# Patient Record
Sex: MALE | Race: WHITE | Employment: OTHER | URBAN - NONMETROPOLITAN AREA
[De-identification: names, ages, dates, MRNs, and addresses within clinical notes are randomized per-mention and may not be internally consistent; named-entity substitution may affect disease eponyms.]

---

## 2018-01-29 ENCOUNTER — HOSPITAL ENCOUNTER (OUTPATIENT)
Dept: PHYSICAL THERAPY | Age: 83
Setting detail: THERAPIES SERIES
Discharge: HOME | End: 2018-01-29
Payer: MEDICARE

## 2018-01-29 PROCEDURE — G8991 OTHER PT/OT GOAL STATUS: HCPCS

## 2018-01-29 PROCEDURE — 97110 THERAPEUTIC EXERCISES: CPT

## 2018-01-29 PROCEDURE — G8990 OTHER PT/OT CURRENT STATUS: HCPCS

## 2018-01-29 PROCEDURE — 97161 PT EVAL LOW COMPLEX 20 MIN: CPT

## 2018-02-02 ENCOUNTER — HOSPITAL ENCOUNTER (OUTPATIENT)
Dept: PHYSICAL THERAPY | Age: 83
Setting detail: THERAPIES SERIES
Discharge: HOME | End: 2018-02-02
Payer: MEDICARE

## 2018-02-02 PROCEDURE — 97110 THERAPEUTIC EXERCISES: CPT

## 2018-02-02 PROCEDURE — 97116 GAIT TRAINING THERAPY: CPT

## 2018-02-02 NOTE — PROGRESS NOTES
[place]     Time In: 9  Time Out: 1430  Minutes: 45  Timed Code Treatment Minutes: 45 Minutes     Date: 2018  Patient Name: Franklin Chicas,  Gender:  male        CSN: [id number]   : 1930  (80 y.o.)  Referral Date : 17    Referring Practitioner: Dr. Cyndy Benz MD       Diagnosis: Low back pain M54.5, Lumbar DDD M51.36, Left foot drop M21.372  Treatment Diagnosis: L ankle weakness, fall history, decreased balance   Additional Pertinent Hx: Lumbar fracture  - reports he was trying to get out of pool without ladder, XR shows disc height loss L5/S1, old L1 compression fx. In May 2017 he fell in his bathroom trying to wash his feet in the sink, landed on buttocks. Went to [place] - no fracture. [facility]. General:  PT Visit Information  Onset Date: 18  PT Insurance Information: Medicare - [employer] Secondary - $3700 cap, Aquatic YES, modalities YES, no ionto, no heat/cold  Total # of Visits to Date: 2  Plan of Care/Certification Expiration Date: 18  Progress Note Counter: 2/10 for PN               Subjective:  Chart Reviewed: Yes  Patient assessed for rehabilitation services?: Yes  Comments: Physician follow up: unknown      Subjective: Patient has been performing HEP for ankle strengthening. He has difficulty with standing balance. Today he presents with L AFO and tennis shoes. Pain:  Patient Currently in Pain: Denies         Objective    Exercises  Exercise 2: 20 reps Green theraband 4 way - with therapist holding band. Patient is using his desk at home. No AFO seated. Exercise 3: Tandem balance x30 sec each foot position - frequent LOB with L foot behind - wearing AFO  Exercise 5: Patient spent about 10 mins changing into tennis shoes and L AFO - used shoe horn for R shoe. Observation of gait wearing L carbon AFO.    Exercise 6: L carbon AFO -

## 2018-02-05 ENCOUNTER — HOSPITAL ENCOUNTER (OUTPATIENT)
Dept: PHYSICAL THERAPY | Age: 83
Setting detail: THERAPIES SERIES
Discharge: HOME | End: 2018-02-05
Payer: MEDICARE

## 2018-02-05 PROCEDURE — 97110 THERAPEUTIC EXERCISES: CPT

## 2018-02-09 ENCOUNTER — APPOINTMENT (OUTPATIENT)
Dept: PHYSICAL THERAPY | Age: 83
End: 2018-02-09
Payer: MEDICARE

## 2018-02-13 ENCOUNTER — HOSPITAL ENCOUNTER (OUTPATIENT)
Dept: PHYSICAL THERAPY | Age: 83
Setting detail: THERAPIES SERIES
Discharge: HOME | End: 2018-02-13
Payer: MEDICARE

## 2018-02-13 PROCEDURE — 97110 THERAPEUTIC EXERCISES: CPT

## 2018-02-13 NOTE — PROGRESS NOTES
cues from therapist in order to be IND for HEP. Short term goal 3: Patient will increase L ankle strength to 5/5 including single leg heel raise x20 reps no pain in order to stabilize ankle during gait. Short term goal 4: Patient will balance in either tandem stance x30 sec no LOB in order to decrease path deviation during gait. Long term goals  Time Frame for Long term goals : 8 weeks  Long term goal 1: Patient will ascend/descend full flight of steps no handrail reciprocal pattern without LOB in order to decrease risk of falls. Long term goal 2: Patient will balance in SLS x10 sec either LE in order to stabilize ankle for stepping over obstacles. Long term goal 3: Patient will demonstrate no LOB with tandem walking, lateral, retro, or braided walking, and lunge walking in order to stabilize ankle during gait. Long term goal 4: Patient will demonstrate no LOB with tapping and stepping over hurdles, walking with head turns, or quick direction changes with walking.           Flor Bonner, PT

## 2018-02-16 ENCOUNTER — HOSPITAL ENCOUNTER (OUTPATIENT)
Dept: PHYSICAL THERAPY | Age: 83
Setting detail: THERAPIES SERIES
Discharge: HOME | End: 2018-02-16
Payer: MEDICARE

## 2018-02-16 PROCEDURE — 97110 THERAPEUTIC EXERCISES: CPT

## 2018-02-16 NOTE — PROGRESS NOTES
around desk similar to home set up. Patient no longer needed cues for set up or technique with eversion, inversion. Needed max set up for dorsiflexion. Exercise 3: In // bars - Tandem balance x30 sec each foot position - light UE on railing, maximum 30 sec before LOB    Exercise 4: In // bars - Red chichi disc under each foot step stance, and tandem stance x30 sec each foot position, maximum 8 sec before LOB. Exercise 8: In // bars Blue foam - B 3 way hip x10 reps - no UE - emphasis on balance - frequent light touches to railing for balance. Exercise 9: Standing step stretching - hamstring and gastroc 2x 20 sec   Exercise 10: Rocker board - 30 sec either direction and x10 reps - frequent UE support   Exercise 12: Advised that patient is safe to use massage to lateral lower leg for peroneals in an attempt to improve his malleolus pain at night. Patient demonstrates understanding of techniques - massage but no trigger point or deep pressure          Activity Tolerance:  Activity Tolerance: Patient Tolerated treatment well    Assessment: Body structures, Functions, Activity limitations: Decreased strength, Decreased endurance, Decreased balance, Decreased high-level IADLs  Assessment: Patient demonstrates improved technique with ankle theraband but still needed set up for ankle dorsiflexion. Continues to demo L ankle weakness with standing balance tasks. Prognosis: Excellent       Patient Education:  Patient Education: Continue theraband HEP for ankle. May initiate massage to L ankle malleolus and peroneals. Plan:  Times per week: 2x/week  Plan weeks: 8 weeks  Specific instructions for Next Treatment: Stationary bike warm up, Step stretching (gastroc, hamstring), standing balance (rockerboard, foam, BOSU, hydrostick tandem, single leg), dynamic balance (hurdles, lunge walking, tandem walking, retro, lateral, braiding), Single leg eccentric tap down.  Emphasis on L ankle balance and

## 2018-02-20 ENCOUNTER — HOSPITAL ENCOUNTER (OUTPATIENT)
Dept: PHYSICAL THERAPY | Age: 83
Setting detail: THERAPIES SERIES
Discharge: HOME | End: 2018-02-20
Payer: MEDICARE

## 2018-02-20 PROCEDURE — 97110 THERAPEUTIC EXERCISES: CPT

## 2018-02-20 NOTE — PROGRESS NOTES
demonstrate good technique for 4 way ankle theraband without cues from therapist in order to be IND for HEP. Short term goal 3: Patient will increase L ankle strength to 5/5 including single leg heel raise x20 reps no pain in order to stabilize ankle during gait. Short term goal 4: Patient will balance in either tandem stance x30 sec no LOB in order to decrease path deviation during gait. Long term goals  Time Frame for Long term goals : 8 weeks  Long term goal 1: Patient will ascend/descend full flight of steps no handrail reciprocal pattern without LOB in order to decrease risk of falls. Long term goal 2: Patient will balance in SLS x10 sec either LE in order to stabilize ankle for stepping over obstacles. Long term goal 3: Patient will demonstrate no LOB with tandem walking, lateral, retro, or braided walking, and lunge walking in order to stabilize ankle during gait. Long term goal 4: Patient will demonstrate no LOB with tapping and stepping over hurdles, walking with head turns, or quick direction changes with walking.           Eitan Gan, PT

## 2018-02-23 ENCOUNTER — HOSPITAL ENCOUNTER (OUTPATIENT)
Dept: PHYSICAL THERAPY | Age: 83
Setting detail: THERAPIES SERIES
Discharge: HOME | End: 2018-02-23
Payer: MEDICARE

## 2018-02-23 PROCEDURE — 97110 THERAPEUTIC EXERCISES: CPT

## 2018-02-23 NOTE — PROGRESS NOTES
[place]     Time In: 4936  Time Out: 1524  Minutes: 39  Timed Code Treatment Minutes: 39 Minutes     Date: 2018  Patient Name: Nisa Sotelo,  Gender:  male        CSN: [id number]   : 1930  (80 y.o.)  Referral Date : 17    Referring Practitioner: Dr. Kassie Mclaughlin MD       Diagnosis: Low back pain M54.5, Lumbar DDD M51.36, Left foot drop M21.372  Treatment Diagnosis: L ankle weakness, fall history, decreased balance   Additional Pertinent Hx: Lumbar fracture  - reports he was trying to get out of pool without ladder, XR shows disc height loss L5/S1, old L1 compression fx. In May 2017 he fell in his bathroom trying to wash his feet in the sink, landed on buttocks. Went to [place] - no fracture. [facility]. General:  PT Visit Information  Onset Date: 18  PT Insurance Information: Medicare - [employer] Secondary - $3700 cap, Aquatic YES, modalities YES, no ionto, no heat/cold  Total # of Visits to Date: 7  Plan of Care/Certification Expiration Date: 18  Progress Note Counter: 7/10 for PN               Subjective:  Chart Reviewed: Yes  Patient assessed for rehabilitation services?: Yes  Comments: Physician follow up: [place] follow up 3/15/18 - hopes to have changes made to AFO. Subjective: Patient has been performing HEP for ankle strengthening. Today he is wearing loafers despite being asked to wear tennis shoes. Patient reports that when he walks pushing a shopping cart, he does not notice his foot slap as much. Patient wanting to continue PT after reassessment next visit in order to walk with more stability. Pain:  Patient Currently in Pain: Denies         Objective    Exercises  Exercise 1: Total gym Heel raises Level J x20 reps   Exercise 2: 15 reps Green theraband 4 way - with band tied around desk similar to home set up.  Patient no

## 2018-02-27 ENCOUNTER — HOSPITAL ENCOUNTER (OUTPATIENT)
Dept: PHYSICAL THERAPY | Age: 83
Setting detail: THERAPIES SERIES
Discharge: HOME | End: 2018-02-27
Payer: MEDICARE

## 2018-02-27 PROCEDURE — G8991 OTHER PT/OT GOAL STATUS: HCPCS

## 2018-02-27 PROCEDURE — 97110 THERAPEUTIC EXERCISES: CPT

## 2018-02-27 PROCEDURE — G8990 OTHER PT/OT CURRENT STATUS: HCPCS

## 2018-02-27 NOTE — PROGRESS NOTES
[facility]     Time In: 7183  Time Out: 5971  Minutes: 45  Timed Code Treatment Minutes: 45 Minutes     Date: 2018  Patient Name: Nisa Sotelo,  Gender:  male        CSN: [id number]   : 1930  (80 y.o.)  Referral Date : 17    Referring Practitioner: Dr. Kassie Mclaughlin MD       Diagnosis: Low back pain M54.5, Lumbar DDD M51.36, Left foot drop M21.372  Treatment Diagnosis: L ankle weakness, fall history, decreased balance   Additional Pertinent Hx: Lumbar fracture  - reports he was trying to get out of pool without ladder, XR shows disc height loss L5/S1, old L1 compression fx. In May 2017 he fell in his bathroom trying to wash his feet in the sink, landed on buttocks. Went to [facility] - no fracture. [facility]. General:  PT Visit Information  Onset Date: 18  PT Insurance Information: Medicare - [employer] Secondary - $3700 cap, Aquatic YES, modalities YES, no ionto, no heat/cold  Total # of Visits to Date: 8  Plan of Care/Certification Expiration Date: 18  Progress Note Counter: 0/10 for PN - progress note visit #8 on 18               Subjective:  Chart Reviewed: Yes  Patient assessed for rehabilitation services?: Yes  Comments: Physician follow up: [facility] follow up 3/15/18 - hopes to have changes made to AFO. Subjective: Patient has been performing HEP for ankle strengthening. He has not been wearing AFO, plans to ask for a newer fitting brace. Patient feels he is walking a little better, wants to continue PT. Pain:  Patient Currently in Pain: Denies         Objective    Exercises  Exercise 2: 15 reps Green theraband 4 way - with band tied around desk similar to home set up. Patient no longer needed cues for set up or technique with eversion, inversion. Needed max set up for dorsiflexion.    Exercise 3: In // bars - Tandem balance x30 sec Comment: Continue PT POC    Goals:  Patient goals : Patient wants to primarily focus on L ankle control, denies LBP     Short term goals  Time Frame for Short term goals: 4 weeks   Short term goal 1: Patient will report decreased L lateral gastroc/peroneal pain at end of day to 25% of the time in order to improve quality of sleep. - Goal Met 2/27/18 - patient reports he no longer has pain in L ankle/lateral malleolus at night   Short term goal 2: Patient will demonstrate good technique for 4 way ankle theraband without cues from therapist in order to be IND for HEP.  - Goal Met 2/27/18 - no cues needed for technique, patient performing daily for HEP. Short term goal 3: Patient will increase L ankle strength to 5/5 including single leg heel raise x20 reps no pain in order to stabilize ankle during gait. - Not Met 2/27/18 - progress made - ankle Inv 5/5, ankle Ev 5/5, ankle DF 4+/5, and single leg heel raise x10 reps no pain. Short term goal 4: Patient will balance in either tandem stance x30 sec no LOB in order to decrease path deviation during gait. - Goal Met 2/27/18 - 30 seconds, no UE support, no LOB    Long term goals  Time Frame for Long term goals : 8 weeks  Long term goal 1: Patient will ascend/descend full flight of steps no handrail reciprocal pattern without LOB in order to decrease risk of falls. - Not Met 2/27/18 - patient still using one handrail with reciprocal pattern for climbing stairs due to balance. Long term goal 2: Patient will balance in SLS x10 sec either LE in order to stabilize ankle for stepping over obstacles. - Not Met 2/27/18 - R foot 10 sec, L foot 6 sec   Long term goal 3: Patient will demonstrate no LOB with tandem walking, lateral, retro, or braided walking, and lunge walking in order to stabilize ankle during gait.  - Not Met 2/27/18 - occ LOB with CGA from therapist during these x25   Long term goal 4: Patient will demonstrate no LOB with tapping and stepping over hurdles,

## 2018-03-05 ENCOUNTER — HOSPITAL ENCOUNTER (OUTPATIENT)
Dept: PHYSICAL THERAPY | Age: 83
Setting detail: THERAPIES SERIES
Discharge: HOME | End: 2018-03-05
Payer: MEDICARE

## 2018-03-05 PROCEDURE — 97110 THERAPEUTIC EXERCISES: CPT

## 2018-03-05 NOTE — PROGRESS NOTES
[place]     Time In: 6099  Time Out: 1625  Minutes: 40  Timed Code Treatment Minutes: 40 Minutes     Date: 3/5/2018  Patient Name: Carla Ashby,  Gender:  male        CSN: [id number]   : 1930  (80 y.o.)  Referral Date : 17    Referring Practitioner: Dr. Galdino Fitzgerald MD       Diagnosis: Low back pain M54.5, Lumbar DDD M51.36, Left foot drop M21.372  Treatment Diagnosis: L ankle weakness, fall history, decreased balance   Additional Pertinent Hx: Lumbar fracture  - reports he was trying to get out of pool without ladder, XR shows disc height loss L5/S1, old L1 compression fx. In May 2017 he fell in his bathroom trying to wash his feet in the sink, landed on buttocks. Went to [place] - no fracture. [facility]. General:  PT Visit Information  Onset Date: 18  PT Insurance Information: Medicare - [employer] Secondary - $3700 cap, Aquatic YES, modalities YES, no ionto, no heat/cold  Total # of Visits to Date: 9  Plan of Care/Certification Expiration Date: 18  Progress Note Counter: 1/10 for PN - progress note visit #8 on 18               Subjective:  Chart Reviewed: Yes  Patient assessed for rehabilitation services?: Yes  Comments: Physician follow up: [place] follow up 3/15/18 - hopes to have changes made to AFO. Subjective: Patient denies any complaint, no changes with L foot or ankle. He has been performing HEP consistently. Pain:  Patient Currently in Pain: Denies         Objective    Exercises  Exercise 3: In // [employer] - single leg - L leg maximum 6 sec, R leg maximum 8 sec   Exercise 4: In // bars - 4 inch step eccentric tap down no UE x20 reps each  Exercise 8:  In // bars level surface - B 3 way hip x15 reps, heel raise and toe raises x20 each - no UE - emphasis on balance   Exercise 9: Standing step stretching - hamstring and gastroc x15 sec each Exercise 10: Rocker board - 30 sec either direction and x10 reps no UE - May discontinue next time   Exercise 13: In open clinic - Retro and lateral walking 2x25 ft, braided walking 2x25 ft, tandem walking 2x 25 ft - CGA from therapist  Exercise 14: Orange hurdles stepping over 2x 20 ft, and tapping then step over 4x 20 ft - more difficulty with L CC step over         Activity Tolerance:  Activity Tolerance: Patient Tolerated treatment well    Assessment:  Assessment: Progressed standing dynamic balance such as retro walking, multidirectional stepping, and hurdles, with occasional LOB needing CGA from therapist.   Prognosis: Excellent       Patient Education:  Patient Education: HEP handout provided today - SLS, hamstring and gastroc step stretches, tandem walking, braided walking, eccentric step down                      Plan:  Times per week: 2x/week  Plan weeks: 8 weeks  Specific instructions for Next Treatment: Stationary bike warm up, Step stretching (gastroc, hamstring), standing balance (rockerboard, foam, BOSU, hydrostick tandem, single leg), dynamic balance (hurdles, lunge walking, tandem walking, retro, lateral, braiding), Single leg eccentric tap down. Emphasis on L ankle balance and control. Current Treatment Recommendations: Strengthening, ROM, Balance Training, Gait Training, Stair training, Neuromuscular Re-education, Manual Therapy - Soft Tissue Mobilization, Home Exercise Program, Modalities (Modalities may include ultrasound, electrical stim, heat/cold as needed for pain)  Plan Comment: Continue PT POC    Goals:  Patient goals : Patient wants to primarily focus on L ankle control, denies LBP     Short term goals  Time Frame for Short term goals: 4 weeks   Short term goal 3: Patient will increase L ankle strength to 5/5 including single leg heel raise x20 reps no pain in order to stabilize ankle during gait.  - Not Met 2/27/18 - progress made - ankle Inv 5/5, ankle Ev 5/5, ankle DF 4+/5, and

## 2018-03-06 ENCOUNTER — APPOINTMENT (OUTPATIENT)
Dept: PHYSICAL THERAPY | Age: 83
End: 2018-03-06
Payer: MEDICARE

## 2018-03-13 ENCOUNTER — HOSPITAL ENCOUNTER (OUTPATIENT)
Dept: PHYSICAL THERAPY | Age: 83
Setting detail: THERAPIES SERIES
Discharge: HOME | End: 2018-03-13
Payer: MEDICARE

## 2018-03-13 PROCEDURE — 97110 THERAPEUTIC EXERCISES: CPT

## 2018-03-16 ENCOUNTER — HOSPITAL ENCOUNTER (OUTPATIENT)
Dept: PHYSICAL THERAPY | Age: 83
Setting detail: THERAPIES SERIES
Discharge: HOME | End: 2018-03-16
Payer: MEDICARE

## 2018-03-20 ENCOUNTER — APPOINTMENT (OUTPATIENT)
Dept: PHYSICAL THERAPY | Age: 83
End: 2018-03-20
Payer: MEDICARE

## 2018-03-23 ENCOUNTER — HOSPITAL ENCOUNTER (OUTPATIENT)
Dept: PHYSICAL THERAPY | Age: 83
Setting detail: THERAPIES SERIES
Discharge: HOME | End: 2018-03-23
Payer: MEDICARE

## 2018-03-23 PROCEDURE — 97110 THERAPEUTIC EXERCISES: CPT

## 2018-03-27 ENCOUNTER — APPOINTMENT (OUTPATIENT)
Dept: PHYSICAL THERAPY | Age: 83
End: 2018-03-27
Payer: MEDICARE

## 2018-03-27 ENCOUNTER — HOSPITAL ENCOUNTER (OUTPATIENT)
Dept: PHYSICAL THERAPY | Age: 83
Setting detail: THERAPIES SERIES
Discharge: HOME | End: 2018-03-27
Payer: MEDICARE

## 2018-03-27 PROCEDURE — 97110 THERAPEUTIC EXERCISES: CPT

## 2018-03-27 NOTE — PROGRESS NOTES
Standing step stretching - hamstring and gastroc x15 sec each   Exercise 13: In open clinic - Retro walking 2x25 ft, tandem walking 2x 25 ft - CGA from therapist   Exercise 14: Orange hurdles stepping over forward and lateral 2x 20 ft, and tapping then step over forward and lateral 2x 20 ft - more difficulty with L CC step over,          Activity Tolerance:  Activity Tolerance: Patient Tolerated treatment well    Assessment:  Assessment: Patient continues to demonstrate occasional LOB with balance tasks, would benefit from additional PT following reassessment in order to improve balance. Prognosis: Excellent       Patient Education:  Patient Education: Continue standing balance HEP. Continue towel scrunch L foot                      Plan:  Times per week: 2x/week  Plan weeks: 8 weeks  Specific instructions for Next Treatment: Stationary bike warm up, Step stretching (gastroc, hamstring), standing balance (rockerboard, foam, BOSU, hydrostick tandem, single leg), dynamic balance (hurdles, lunge walking, tandem walking, retro, lateral, braiding), Single leg eccentric tap down. Emphasis on L ankle balance and control. Current Treatment Recommendations: Strengthening, ROM, Balance Training, Gait Training, Stair training, Neuromuscular Re-education, Manual Therapy - Soft Tissue Mobilization, Home Exercise Program, Modalities (Modalities may include ultrasound, electrical stim, heat/cold as needed for pain)  Plan Comment: Continue PT POC    Goals:  Patient goals : Patient wants to primarily focus on L ankle control, denies LBP     Short term goals  Time Frame for Short term goals: 4 weeks   Short term goal 3: Patient will increase L ankle strength to 5/5 including single leg heel raise x20 reps no pain in order to stabilize ankle during gait.  - Not Met 3/23/18 - no change from 2/27/18 - 5/5 all directions except L ankle DF 4+/5    Long term goals  Time Frame for Long term goals : 8 weeks  Long term goal 1: Patient will

## 2018-03-29 ENCOUNTER — HOSPITAL ENCOUNTER (OUTPATIENT)
Dept: PHYSICAL THERAPY | Age: 83
Setting detail: THERAPIES SERIES
Discharge: HOME | End: 2018-03-29
Payer: MEDICARE

## 2018-03-29 PROCEDURE — G8991 OTHER PT/OT GOAL STATUS: HCPCS

## 2018-03-29 PROCEDURE — G8990 OTHER PT/OT CURRENT STATUS: HCPCS

## 2018-03-29 PROCEDURE — 97110 THERAPEUTIC EXERCISES: CPT

## 2018-03-29 NOTE — PROGRESS NOTES
I certify that I have examined the patient below and determined that Physical Medicine and Rehabilitation service is necessary; that the secondary diagnosis for the provision of rehabilitation services is consistent with identified needs; that service will be furnished on an outpatient basis while the patient is in my care; that I approve the above plan of care for up to 90 days or as specifically noted above and will review it within that time frame or more often if the patients condition requires. Attestation, signature or co-signature of physician indicates approval of certification requirements.    ________________________ ____________ __________  Physician Signature   Date   Time        [facility]     Time In: 5 (Late arrival )  Time Out: 1530  Minutes: 35  Timed Code Treatment Minutes: 35 Minutes     Date: 3/29/2018  Patient Name: Ara Stanley,  Gender:  male        CSN: [id number]   : 1930  (80 y.o.)  Referral Date : 17    Referring Practitioner: Dr. Tonie Vega MD       Diagnosis: Low back pain M54.5, Lumbar DDD M51.36, Left foot drop M21.372  Treatment Diagnosis: L ankle weakness, fall history, decreased balance   Additional Pertinent Hx: Lumbar fracture  - reports he was trying to get out of pool without ladder, XR shows disc height loss L5/S1, old L1 compression fx. In May 2017 he fell in his bathroom trying to wash his feet in the sink, landed on buttocks. Went to [place] - no fracture. [facility].                    General:  PT Visit Information  Onset Date: 18  PT Insurance Information: Medicare - [employer] Secondary - $3700 cap, Aquatic YES, modalities YES, no ionto, no heat/cold  Total # of Visits to Date: 15  Plan of Care/Certification Expiration Date: 18 (4 weeks extended certification date)  Progress Note Counter: 0/8 for PN - progress note Prognosis: Excellent       Patient Education:  Patient Education: Continue standing balance HEP. Continue towel scrunch L foot and ankle theraband for foot and ankle stability. Plan:  Times per week: 2x/week  Plan weeks: 12 weeks extended plan of care  Specific instructions for Next Treatment: Stationary bike warm up, Step stretching (gastroc, hamstring), standing balance (rockerboard, foam, BOSU, hydrostick tandem, single leg), dynamic balance (hurdles, lunge walking, tandem walking, retro, lateral, braiding), Single leg eccentric tap down. Emphasis on L ankle balance and control. Current Treatment Recommendations: Strengthening, ROM, Balance Training, Gait Training, Stair training, Neuromuscular Re-education, Manual Therapy - Soft Tissue Mobilization, Home Exercise Program, Modalities (Modalities may include ultrasound, electrical stim, heat/cold as needed for pain)  Plan Comment: Continue PT POC    Goals:  Patient goals : Patient wants to primarily focus on L ankle control, denies LBP     Short term goals  Time Frame for Short term goals: 4 weeks   Short term goal 1: Patient will report decreased L lateral gastroc/peroneal pain at end of day to 25% of the time in order to improve quality of sleep. - Goal Met 2/27/18 - patient reports he no longer has pain in L ankle/lateral malleolus at night   Short term goal 2: Patient will demonstrate good technique for 4 way ankle theraband without cues from therapist in order to be IND for HEP.  - Goal Met 2/27/18 - no cues needed for technique, patient performing daily for HEP. Short term goal 3: Patient will increase L ankle strength to 5/5 including single leg heel raise x20 reps no pain in order to stabilize ankle during gait. - Not Met 3/29/18 - no change from 2/27/18 - 5/5 all directions except L ankle DF 4+/5  Short term goal 4: Patient will balance in either tandem stance x30 sec no LOB in order to decrease path deviation during gait.  - Goal Met 2/27/18 - 30 seconds, no UE support, no LOB    Long term goals  Time Frame for Long term goals : 12 weeks extended plan of care  Long term goal 1: Patient will ascend/descend full flight of steps no handrail reciprocal pattern without LOB in order to decrease risk of falls. - Not Met 3/29/18 - progress made, Patient able to attempt steps without handrail but occasional scissoring especially with descending steps, unsteady without railing  Long term goal 2: Patient will balance in SLS x10 sec either LE in order to stabilize ankle for stepping over obstacles. - Goal Met 3/29/18 - R foot 15 sec, L foot 13 sec  Long term goal 3: Patient will demonstrate no LOB with tandem walking, lateral, retro, or braided walking, and lunge walking in order to stabilize ankle during gait. - Not Met 3/29/18 - discontinued lunge walking due to c/o knee pain, improved lateral walking, but retro and tandem walking still causes occ LOB with CGA from therapist   Long term goal 4: Patient will demonstrate no LOB with tapping and stepping over hurdles, walking with head turns, or quick direction changes with walking. - Not Met 3/29/18 - able to step over 12 inch hurdles no LOB, however walking with direction changes causes patient to lose balance frequently, often scissoring or crossing feet to recover balance. PT G-Codes  Functional Assessment Tool Used: Single leg stance balance (Goal of 10 sec either LE)  Score: RLE 15 sec, LLE 13 sec (0% impairment, improved from baseline 2 sec on LLE or 80% impairment)  Functional Limitation: Other PT primary  Other PT Primary Current Status (): 0 percent impaired, limited or restricted  Other PT Primary Goal Status ():  At least 60 percent but less than 80 percent impaired, limited or restricted (Goal Met)    Isabel Robledo, PT

## 2018-04-10 ENCOUNTER — HOSPITAL ENCOUNTER (OUTPATIENT)
Dept: PHYSICAL THERAPY | Age: 83
Setting detail: THERAPIES SERIES
Discharge: HOME | End: 2018-04-10
Payer: MEDICARE

## 2018-04-10 PROCEDURE — 97110 THERAPEUTIC EXERCISES: CPT

## 2018-04-13 ENCOUNTER — HOSPITAL ENCOUNTER (OUTPATIENT)
Dept: PHYSICAL THERAPY | Age: 83
Setting detail: THERAPIES SERIES
Discharge: HOME | End: 2018-04-13
Payer: MEDICARE

## 2018-04-13 PROCEDURE — 97110 THERAPEUTIC EXERCISES: CPT

## 2018-04-17 ENCOUNTER — HOSPITAL ENCOUNTER (OUTPATIENT)
Dept: PHYSICAL THERAPY | Age: 83
Setting detail: THERAPIES SERIES
Discharge: HOME | End: 2018-04-17
Payer: MEDICARE

## 2018-04-17 PROCEDURE — 97110 THERAPEUTIC EXERCISES: CPT

## 2018-04-20 ENCOUNTER — HOSPITAL ENCOUNTER (OUTPATIENT)
Dept: PHYSICAL THERAPY | Age: 83
Setting detail: THERAPIES SERIES
Discharge: HOME | End: 2018-04-20
Payer: MEDICARE

## 2018-04-20 PROCEDURE — 97110 THERAPEUTIC EXERCISES: CPT

## 2018-04-24 ENCOUNTER — HOSPITAL ENCOUNTER (OUTPATIENT)
Dept: PHYSICAL THERAPY | Age: 83
Setting detail: THERAPIES SERIES
Discharge: HOME | End: 2018-04-24
Payer: MEDICARE

## 2018-04-24 PROCEDURE — 97110 THERAPEUTIC EXERCISES: CPT

## 2018-04-27 ENCOUNTER — HOSPITAL ENCOUNTER (OUTPATIENT)
Dept: PHYSICAL THERAPY | Age: 83
Setting detail: THERAPIES SERIES
Discharge: HOME | End: 2018-04-27
Payer: MEDICARE

## 2018-05-01 ENCOUNTER — HOSPITAL ENCOUNTER (OUTPATIENT)
Dept: PHYSICAL THERAPY | Age: 83
Setting detail: THERAPIES SERIES
Discharge: HOME | End: 2018-05-01
Payer: MEDICARE

## 2018-05-01 PROCEDURE — 97110 THERAPEUTIC EXERCISES: CPT

## 2018-05-01 PROCEDURE — G8992 OTHER PT/OT  D/C STATUS: HCPCS

## 2018-05-01 PROCEDURE — G8991 OTHER PT/OT GOAL STATUS: HCPCS

## 2018-05-24 ENCOUNTER — PROCEDURE VISIT (OUTPATIENT)
Dept: NEUROLOGY | Age: 83
End: 2018-05-24
Payer: MEDICARE

## 2018-05-24 DIAGNOSIS — M54.16 LEFT LUMBAR RADICULOPATHY: Primary | ICD-10-CM

## 2018-05-24 DIAGNOSIS — R29.898 LEFT LEG WEAKNESS: ICD-10-CM

## 2018-05-24 PROCEDURE — 95908 NRV CNDJ TST 3-4 STUDIES: CPT | Performed by: PSYCHIATRY & NEUROLOGY

## 2018-05-24 PROCEDURE — 95886 MUSC TEST DONE W/N TEST COMP: CPT | Performed by: PSYCHIATRY & NEUROLOGY

## 2018-08-09 ENCOUNTER — HOSPITAL ENCOUNTER (OUTPATIENT)
Dept: NON INVASIVE DIAGNOSTICS | Age: 83
Discharge: HOME | End: 2018-08-09
Payer: MEDICARE

## 2018-08-09 LAB
LV EF: 63 %
LVEF MODALITY: NORMAL

## 2018-08-09 PROCEDURE — 93306 TTE W/DOPPLER COMPLETE: CPT

## 2018-09-04 ENCOUNTER — OFFICE VISIT (OUTPATIENT)
Dept: CARDIOLOGY CLINIC | Age: 83
End: 2018-09-04
Payer: MEDICARE

## 2018-09-04 VITALS
WEIGHT: 148 LBS | HEART RATE: 64 BPM | DIASTOLIC BLOOD PRESSURE: 78 MMHG | BODY MASS INDEX: 21.19 KG/M2 | SYSTOLIC BLOOD PRESSURE: 138 MMHG | HEIGHT: 70 IN

## 2018-09-04 DIAGNOSIS — I35.0 AORTIC VALVE STENOSIS, ETIOLOGY OF CARDIAC VALVE DISEASE UNSPECIFIED: ICD-10-CM

## 2018-09-04 PROCEDURE — 1036F TOBACCO NON-USER: CPT | Performed by: INTERNAL MEDICINE

## 2018-09-04 PROCEDURE — 4040F PNEUMOC VAC/ADMIN/RCVD: CPT | Performed by: INTERNAL MEDICINE

## 2018-09-04 PROCEDURE — G8420 CALC BMI NORM PARAMETERS: HCPCS | Performed by: INTERNAL MEDICINE

## 2018-09-04 PROCEDURE — 93000 ELECTROCARDIOGRAM COMPLETE: CPT | Performed by: INTERNAL MEDICINE

## 2018-09-04 PROCEDURE — 99204 OFFICE O/P NEW MOD 45 MIN: CPT | Performed by: INTERNAL MEDICINE

## 2018-09-04 PROCEDURE — 1101F PT FALLS ASSESS-DOCD LE1/YR: CPT | Performed by: INTERNAL MEDICINE

## 2018-09-04 PROCEDURE — G8427 DOCREV CUR MEDS BY ELIG CLIN: HCPCS | Performed by: INTERNAL MEDICINE

## 2018-09-04 PROCEDURE — 1123F ACP DISCUSS/DSCN MKR DOCD: CPT | Performed by: INTERNAL MEDICINE

## 2018-09-04 ASSESSMENT — ENCOUNTER SYMPTOMS
SHORTNESS OF BREATH: 0
SORE THROAT: 0
LEFT EYE: 0
BACK PAIN: 0
BLURRED VISION: 0
ABDOMINAL PAIN: 0
VOMITING: 0
NAUSEA: 0
RIGHT EYE: 0
DOUBLE VISION: 0
COUGH: 0
CONSTIPATION: 0
WHEEZING: 0
DIARRHEA: 0

## 2018-09-04 NOTE — PROGRESS NOTES
[facility]  [address] [facility] [address] [facility] [facility]  Dept: [phone]  Dept Fax: [phone]      CARDIAC ELECTROPHYSIOLOGY  CONSULTATION    9/4/2018      REFERRING PROVIDER:  Dr. German Ruiz:  Chief Complaint   Patient presents with    Follow Up After Procedure     [facility] results, [facility] A[facility]       HPI:  HPI    09/04/2018: The patient is followed by Dr. Aubree Starks. The patient reports seeing an allergist and was told he had a cardiac murmur. The patient told Dr. Aubree Starks this and he was sent to the [facility] in [place] for an ECHO. The ECHO quality was poor and he asked Dr. Aubree Starks if he could have an ECHO performed at [facility]. The patient's ECHO was performed on 8/9/2018 and showed a normal LVEF of 60-65% with moderate-to-evere aortic stenosis. The patient is very active. He is a retired . He swims 300 meters once a week. He denies having any shortness of breath or chest pains while swimming. The patient does report having numbness develop in both his fingers after he has been swimming for 100 to 150 meters. The patient also reports having this symptom when he is laying in bed and reading a news paper or book. The patient also reports having shortness of breath when he walks up the stairs in his home occasionally. The patient is being referred for further evaluation and management of his aortic stenosis. PMH:  History obtained from chart review and the patient.   Past Medical History:   Diagnosis Date    Arthritis     Cataract     GERD (gastroesophageal reflux disease)     Senile osteoporosis        PSH:  Past Surgical History:   Procedure Laterality Date    COLONOSCOPY  2010    EYE SURGERY      NASAL SEPTUM SURGERY         FAMILY HISTORY:  Family History   Problem Relation Age of Onset    Stroke Mother    [facility] Other Father         old age   [facility] Other Sister         Cardiac arrest       SOCIAL HISTORY:  Social History     Social History range of motion and no swelling. Right knee: Normal. He exhibits normal range of motion and no swelling. Left knee: Normal. He exhibits normal range of motion and no swelling. Right upper arm: Normal.        Left upper arm: Normal.        Right upper leg: Normal.        Left upper leg: Normal.        Right lower leg: Normal.        Left lower leg: Normal.   Lymphadenopathy:        Head (right side): No submandibular adenopathy present. Head (left side): No submandibular adenopathy present. Neurological: He is alert and oriented to person, place, and time. He has normal strength. He displays normal reflexes. No cranial nerve deficit or sensory deficit. Coordination and gait normal.   Skin: Skin is warm and dry. No lesion and no rash noted. He is not diaphoretic. No cyanosis. Nails show no clubbing. Psychiatric: His speech is normal and behavior is normal. Judgment and thought content normal. His mood appears not anxious. His affect is not angry. Cognition and memory are normal. He does not exhibit a depressed mood. Nursing note and vitals reviewed. DIAGNOSTIC STUDIES & LABORATORY DATA:    I have personally reviewed and interpreted the results of the following diagnostic testing  CARDIAC HISTORY:    ECHO: 08/09/2018  Summary   Normal left ventricle size and systolic function. Ejection fraction was   estimated at 60 to 65 %.  There were no regional left ventricular wall   motion abnormalities and wall thickness was within normal limits.   Doppler parameters were consistent with abnormal left ventricular   relaxation (grade 1 diastolic dysfunction).   Aortic valve leaflets are Moderately calcified.   Leaflets exhibited moderately increased thickness and severely reduced   cuspal separation of the aortic valve.   There is moderate-to-severe aortic stenosis with valve area of 0.9 to 1 sq   The maximum aortic valve gradient is 50 mmHg, the mean gradient is 32   mmHg, and the peak velocity is 3.6 m/s.  EC2018 NSR, HR 64 bpm    Lab Results   Component Value Date    ALT 19 2011    AST 22 2011          IMPRESSION:  Aortic stenosis:  The patient is an 81 y/o male that has moderate-to-severe aortic stenosis. He does have shortness of breath when he walks up the stairs in his home on occasion. However, the patient swims 300 meters a week and does not have any symptoms. The patient does develop some radiculopathy after swimming and also when he holds his arms a certain way when reading a book. I discussed the findings with the patient and recommended continued observation. I will see the patient back in the office in 6 months and repeat and ECHO at that time. The patient was also concerned that his BP will sometimes be in the 130-140's. I explained to the patient that starting a BP medication could be potentially harmful since he has aortic stenosis and this could drop his peripheral perfusion and cause him to have other complications. PLAN:  1. Continue monitoring. 2. F/U in 6 months. 3. Repeat ECHO in 6 months.          Electronically signed by Cici Vasquez MD on 2018 at 11:55 AM

## 2018-10-05 ENCOUNTER — HOSPITAL ENCOUNTER (OUTPATIENT)
Dept: NON INVASIVE DIAGNOSTICS | Age: 83
Discharge: HOME | End: 2018-10-05
Payer: OTHER GOVERNMENT

## 2018-10-05 VITALS — HEIGHT: 69 IN | WEIGHT: 140 LBS | BODY MASS INDEX: 20.73 KG/M2

## 2018-10-05 PROCEDURE — A9500 TC99M SESTAMIBI: HCPCS | Performed by: FAMILY MEDICINE

## 2018-10-05 PROCEDURE — 93017 CV STRESS TEST TRACING ONLY: CPT | Performed by: NUCLEAR MEDICINE

## 2018-10-05 PROCEDURE — 78452 HT MUSCLE IMAGE SPECT MULT: CPT

## 2018-10-05 PROCEDURE — 6360000002 HC RX W HCPCS

## 2018-10-05 PROCEDURE — 3430000000 HC RX DIAGNOSTIC RADIOPHARMACEUTICAL: Performed by: FAMILY MEDICINE

## 2018-10-05 PROCEDURE — 2709999900 HC NON-CHARGEABLE SUPPLY

## 2018-10-05 RX ADMIN — Medication 9.9 MILLICURIE: at 12:42

## 2018-10-05 RX ADMIN — Medication 32.9 MILLICURIE: at 14:10

## 2018-10-08 ENCOUNTER — TELEPHONE (OUTPATIENT)
Dept: INTERNAL MEDICINE CLINIC | Age: 83
End: 2018-10-08

## 2018-10-08 ENCOUNTER — OFFICE VISIT (OUTPATIENT)
Dept: INTERNAL MEDICINE CLINIC | Age: 83
End: 2018-10-08
Payer: MEDICARE

## 2018-10-08 ENCOUNTER — NURSE ONLY (OUTPATIENT)
Dept: INTERNAL MEDICINE CLINIC | Age: 83
End: 2018-10-08
Payer: MEDICARE

## 2018-10-08 ENCOUNTER — TELEPHONE (OUTPATIENT)
Dept: CARDIOLOGY CLINIC | Age: 83
End: 2018-10-08

## 2018-10-08 VITALS
SYSTOLIC BLOOD PRESSURE: 136 MMHG | HEIGHT: 69 IN | HEART RATE: 60 BPM | WEIGHT: 146.8 LBS | BODY MASS INDEX: 21.74 KG/M2 | DIASTOLIC BLOOD PRESSURE: 60 MMHG

## 2018-10-08 DIAGNOSIS — E80.6 HYPERBILIRUBINEMIA: ICD-10-CM

## 2018-10-08 DIAGNOSIS — D53.9 MACROCYTIC ANEMIA: ICD-10-CM

## 2018-10-08 DIAGNOSIS — I35.0 NONRHEUMATIC AORTIC VALVE STENOSIS: ICD-10-CM

## 2018-10-08 DIAGNOSIS — E80.6 HYPERBILIRUBINEMIA: Primary | ICD-10-CM

## 2018-10-08 LAB
ABSOLUTE RETIC #: 43 THOU/MM3 (ref 20–115)
BILIRUB SERPL-MCNC: 1.3 MG/DL (ref 0.3–1.2)
IMMATURE RETIC FRACT: 13.4 % (ref 2.3–13.4)
LD: 171 U/L (ref 100–190)
RETIC HEMOGLOBIN: 34 PG (ref 28.2–35.7)
RETICULOCYTE ABSOLUTE COUNT: 1.4 % (ref 0.5–2)
TSH SERPL DL<=0.05 MIU/L-ACNC: 0.95 UIU/ML (ref 0.4–4.2)

## 2018-10-08 PROCEDURE — 36415 COLL VENOUS BLD VENIPUNCTURE: CPT | Performed by: INTERNAL MEDICINE

## 2018-10-08 PROCEDURE — G8420 CALC BMI NORM PARAMETERS: HCPCS | Performed by: INTERNAL MEDICINE

## 2018-10-08 PROCEDURE — G8484 FLU IMMUNIZE NO ADMIN: HCPCS | Performed by: INTERNAL MEDICINE

## 2018-10-08 PROCEDURE — 1123F ACP DISCUSS/DSCN MKR DOCD: CPT | Performed by: INTERNAL MEDICINE

## 2018-10-08 PROCEDURE — 99203 OFFICE O/P NEW LOW 30 MIN: CPT | Performed by: INTERNAL MEDICINE

## 2018-10-08 PROCEDURE — G8427 DOCREV CUR MEDS BY ELIG CLIN: HCPCS | Performed by: INTERNAL MEDICINE

## 2018-10-08 PROCEDURE — 4040F PNEUMOC VAC/ADMIN/RCVD: CPT | Performed by: INTERNAL MEDICINE

## 2018-10-08 PROCEDURE — 1101F PT FALLS ASSESS-DOCD LE1/YR: CPT | Performed by: INTERNAL MEDICINE

## 2018-10-08 PROCEDURE — 1036F TOBACCO NON-USER: CPT | Performed by: INTERNAL MEDICINE

## 2018-10-08 ASSESSMENT — PATIENT HEALTH QUESTIONNAIRE - PHQ9
1. LITTLE INTEREST OR PLEASURE IN DOING THINGS: 0
SUM OF ALL RESPONSES TO PHQ QUESTIONS 1-9: 0
2. FEELING DOWN, DEPRESSED OR HOPELESS: 0
SUM OF ALL RESPONSES TO PHQ QUESTIONS 1-9: 0
SUM OF ALL RESPONSES TO PHQ9 QUESTIONS 1 & 2: 0

## 2018-10-11 ENCOUNTER — TELEPHONE (OUTPATIENT)
Dept: INTERNAL MEDICINE CLINIC | Age: 83
End: 2018-10-11

## 2018-10-11 LAB — HAPTOGLOBIN: 62 MG/DL (ref 30–200)

## 2018-10-15 ENCOUNTER — OFFICE VISIT (OUTPATIENT)
Dept: CARDIOLOGY CLINIC | Age: 83
End: 2018-10-15
Payer: MEDICARE

## 2018-10-15 VITALS
WEIGHT: 146 LBS | DIASTOLIC BLOOD PRESSURE: 64 MMHG | HEIGHT: 69 IN | HEART RATE: 82 BPM | BODY MASS INDEX: 21.62 KG/M2 | SYSTOLIC BLOOD PRESSURE: 128 MMHG

## 2018-10-15 DIAGNOSIS — R06.09 DYSPNEA ON EXERTION: Primary | ICD-10-CM

## 2018-10-15 DIAGNOSIS — I35.0 NONRHEUMATIC AORTIC VALVE STENOSIS: ICD-10-CM

## 2018-10-15 DIAGNOSIS — R94.39 ABNORMAL STRESS TEST: ICD-10-CM

## 2018-10-15 PROCEDURE — 99204 OFFICE O/P NEW MOD 45 MIN: CPT | Performed by: NUCLEAR MEDICINE

## 2018-10-15 PROCEDURE — 4040F PNEUMOC VAC/ADMIN/RCVD: CPT | Performed by: NUCLEAR MEDICINE

## 2018-10-15 PROCEDURE — 1036F TOBACCO NON-USER: CPT | Performed by: NUCLEAR MEDICINE

## 2018-10-15 PROCEDURE — 1123F ACP DISCUSS/DSCN MKR DOCD: CPT | Performed by: NUCLEAR MEDICINE

## 2018-10-15 PROCEDURE — G8427 DOCREV CUR MEDS BY ELIG CLIN: HCPCS | Performed by: NUCLEAR MEDICINE

## 2018-10-15 PROCEDURE — G8420 CALC BMI NORM PARAMETERS: HCPCS | Performed by: NUCLEAR MEDICINE

## 2018-10-15 PROCEDURE — G8484 FLU IMMUNIZE NO ADMIN: HCPCS | Performed by: NUCLEAR MEDICINE

## 2018-10-15 PROCEDURE — 1101F PT FALLS ASSESS-DOCD LE1/YR: CPT | Performed by: NUCLEAR MEDICINE

## 2018-10-15 ASSESSMENT — ENCOUNTER SYMPTOMS
ABDOMINAL DISTENTION: 0
BACK PAIN: 0
RECTAL PAIN: 0
ANAL BLEEDING: 0
CONSTIPATION: 0
CHEST TIGHTNESS: 0
PHOTOPHOBIA: 0
SHORTNESS OF BREATH: 1
NAUSEA: 0
VOMITING: 0
DIARRHEA: 0
ABDOMINAL PAIN: 0
BLOOD IN STOOL: 0

## 2018-10-15 NOTE — PROGRESS NOTES
(before breakfast). omeprazole (Prilosec) 30 min. ac breakfast. (Patient taking differently: Take 20 mg by mouth three times a week omeprazole (Prilosec) 30 min. ac breakfast.) 90 capsule 3    Multiple Vitamins-Iron (MULTIPLE VITAMIN/IRON PO) Take  by mouth daily. No current facility-administered medications for this visit. No Known Allergies  Health Maintenance   Topic Date Due    DTaP/Tdap/Td vaccine (1 - Tdap) 09/05/1949    Shingles Vaccine (1 of 2 - 2 Dose Series) 09/05/1980    Pneumococcal low/med risk (1 of 2 - PCV13) 09/05/1995    Flu vaccine (1) 09/01/2018       Subjective:  Review of Systems   Constitutional: Positive for fatigue. Negative for chills and diaphoresis. HENT: Negative for ear discharge, hearing loss and mouth sores. Eyes: Negative for photophobia. Respiratory: Positive for shortness of breath. Negative for chest tightness. Cardiovascular: Negative for chest pain, palpitations and leg swelling. Gastrointestinal: Negative for abdominal distention, abdominal pain, anal bleeding, blood in stool, constipation, diarrhea, nausea, rectal pain and vomiting. Endocrine: Negative for polyphagia. Genitourinary: Negative for enuresis, hematuria and urgency. Musculoskeletal: Positive for arthralgias. Negative for back pain, gait problem, joint swelling, myalgias, neck pain and neck stiffness. Allergic/Immunologic: Negative for food allergies. Neurological: Negative for dizziness, syncope and light-headedness. Hematological: Negative for adenopathy. Psychiatric/Behavioral: Negative for behavioral problems, confusion, decreased concentration, dysphoric mood and hallucinations. Objective:  Physical Exam   Constitutional: He is oriented to person, place, and time. He appears well-developed. HENT:   Head: Normocephalic. Right Ear: External ear normal.   Left Ear: External ear normal.   Eyes: EOM are normal. Right eye exhibits no discharge.  Left eye exhibits no discharge. Neck: Normal range of motion. Neck supple. No JVD present. Cardiovascular: Normal rate. Exam reveals no gallop and no friction rub. Murmur heard. Pulmonary/Chest: No respiratory distress. He has no wheezes. He has no rales. He exhibits no tenderness. Abdominal: Soft. Bowel sounds are normal. He exhibits no distension and no mass. There is no tenderness. There is no rebound and no guarding. Musculoskeletal: Normal range of motion. He exhibits no edema. Neurological: He is alert and oriented to person, place, and time. No cranial nerve deficit. Coordination normal.   Skin: Skin is warm and dry. Psychiatric: He has a normal mood and affect. His behavior is normal. Thought content normal.     /64   Pulse 82   Ht 5' 9\" (1.753 m)   Wt 146 lb (66.2 kg)   BMI 21.56 kg/m²     Assessment:      Diagnosis Orders   1. Dyspnea on exertion     2. Nonrheumatic aortic valve stenosis     possible CAD  Symptoms as above  Does have multiple risk for CAD      Plan:  No Follow-up on file. Discussed options at length and in full details   Need close monitoring of his AS  ?/ need for a cath   Discussed at length   Continue risk factor modification and medical management  Thank you for allowing me to participate in the care of your patient. Please don't hesitate to contact me regarding any further issues related to the patient care    Orders Placed:  No orders of the defined types were placed in this encounter. Medications Prescribed:  No orders of the defined types were placed in this encounter. Discussed use, benefit, and side effects of prescribed medications. All patient questions answered. Pt voicedunderstanding. Instructed to continue current medications, diet and exercise. Continue risk factor modification and medical management. Patient agreed with treatment plan. Follow up as directed.     Electronically signedby Beto Marino MD on 10/15/2018 at 10:51 AM

## 2018-10-26 ENCOUNTER — PREP FOR PROCEDURE (OUTPATIENT)
Dept: CARDIOLOGY | Age: 83
End: 2018-10-26

## 2018-10-26 RX ORDER — ASPIRIN 325 MG
325 TABLET ORAL ONCE
Status: CANCELLED | OUTPATIENT
Start: 2018-10-26 | End: 2018-10-26

## 2018-10-26 RX ORDER — SODIUM CHLORIDE 0.9 % (FLUSH) 0.9 %
10 SYRINGE (ML) INJECTION EVERY 12 HOURS SCHEDULED
Status: CANCELLED | OUTPATIENT
Start: 2018-10-26

## 2018-10-26 RX ORDER — NITROGLYCERIN 0.4 MG/1
0.4 TABLET SUBLINGUAL EVERY 5 MIN PRN
Status: CANCELLED | OUTPATIENT
Start: 2018-10-26

## 2018-10-26 RX ORDER — DIPHENHYDRAMINE HYDROCHLORIDE 50 MG/ML
50 INJECTION INTRAMUSCULAR; INTRAVENOUS ONCE
Status: CANCELLED | OUTPATIENT
Start: 2018-10-26 | End: 2018-10-26

## 2018-10-26 RX ORDER — SODIUM CHLORIDE 0.9 % (FLUSH) 0.9 %
10 SYRINGE (ML) INJECTION PRN
Status: CANCELLED | OUTPATIENT
Start: 2018-10-26

## 2018-10-26 RX ORDER — SODIUM CHLORIDE 9 MG/ML
INJECTION, SOLUTION INTRAVENOUS CONTINUOUS
Status: CANCELLED | OUTPATIENT
Start: 2018-10-26

## 2018-10-29 ENCOUNTER — HOSPITAL ENCOUNTER (OUTPATIENT)
Dept: INPATIENT UNIT | Age: 83
Discharge: HOME | End: 2018-10-29

## 2018-10-29 ENCOUNTER — TELEPHONE (OUTPATIENT)
Dept: CARDIOLOGY CLINIC | Age: 83
End: 2018-10-29

## 2018-11-05 ENCOUNTER — OFFICE VISIT (OUTPATIENT)
Dept: INTERNAL MEDICINE CLINIC | Age: 83
End: 2018-11-05
Payer: MEDICARE

## 2018-11-05 VITALS
HEART RATE: 60 BPM | BODY MASS INDEX: 21.7 KG/M2 | WEIGHT: 146.5 LBS | DIASTOLIC BLOOD PRESSURE: 60 MMHG | SYSTOLIC BLOOD PRESSURE: 152 MMHG | HEIGHT: 69 IN

## 2018-11-05 DIAGNOSIS — I35.0 NONRHEUMATIC AORTIC VALVE STENOSIS: ICD-10-CM

## 2018-11-05 DIAGNOSIS — E80.4 GILBERT'S SYNDROME: Primary | ICD-10-CM

## 2018-11-05 PROCEDURE — G8427 DOCREV CUR MEDS BY ELIG CLIN: HCPCS | Performed by: INTERNAL MEDICINE

## 2018-11-05 PROCEDURE — 99211 OFF/OP EST MAY X REQ PHY/QHP: CPT | Performed by: INTERNAL MEDICINE

## 2018-11-05 PROCEDURE — G8420 CALC BMI NORM PARAMETERS: HCPCS | Performed by: INTERNAL MEDICINE

## 2018-11-05 RX ORDER — FLUTICASONE PROPIONATE 50 MCG
SPRAY, SUSPENSION (ML) NASAL
COMMUNITY
Start: 2018-10-14

## 2018-11-19 ENCOUNTER — TELEPHONE (OUTPATIENT)
Dept: INTERNAL MEDICINE CLINIC | Age: 83
End: 2018-11-19

## 2018-11-19 DIAGNOSIS — D53.9 MACROCYTIC ANEMIA: Primary | ICD-10-CM

## 2018-11-20 NOTE — TELEPHONE ENCOUNTER
Notified pt can just repeat CBC in  Several months as mild. Pt would like EF to order and he will come to the office for lab draw in late February. Order put in epic.

## 2019-02-15 ENCOUNTER — TELEPHONE (OUTPATIENT)
Dept: INTERNAL MEDICINE CLINIC | Age: 84
End: 2019-02-15

## 2019-02-21 ENCOUNTER — TELEPHONE (OUTPATIENT)
Dept: CARDIOLOGY CLINIC | Age: 84
End: 2019-02-21

## 2020-01-01 ENCOUNTER — HOSPITAL ENCOUNTER (OUTPATIENT)
Dept: PHYSICAL THERAPY | Age: 85
Setting detail: THERAPIES SERIES
Discharge: HOME | End: 2020-12-30
Payer: MEDICARE

## 2020-01-01 ENCOUNTER — HOSPITAL ENCOUNTER (OUTPATIENT)
Dept: PHYSICAL THERAPY | Age: 85
Setting detail: THERAPIES SERIES
Discharge: HOME | End: 2020-12-11
Payer: MEDICARE

## 2020-01-01 ENCOUNTER — OFFICE VISIT (OUTPATIENT)
Dept: PULMONOLOGY | Age: 85
End: 2020-01-01
Payer: MEDICARE

## 2020-01-01 ENCOUNTER — HOSPITAL ENCOUNTER (OUTPATIENT)
Dept: PHYSICAL THERAPY | Age: 85
Setting detail: THERAPIES SERIES
Discharge: HOME | End: 2020-12-16
Payer: MEDICARE

## 2020-01-01 ENCOUNTER — HOSPITAL ENCOUNTER (OUTPATIENT)
Dept: PHYSICAL THERAPY | Age: 85
Setting detail: THERAPIES SERIES
Discharge: HOME | End: 2020-12-22
Payer: MEDICARE

## 2020-01-01 ENCOUNTER — HOSPITAL ENCOUNTER (OUTPATIENT)
Dept: PHYSICAL THERAPY | Age: 85
Setting detail: THERAPIES SERIES
Discharge: HOME | End: 2020-12-04
Payer: MEDICARE

## 2020-01-01 ENCOUNTER — HOSPITAL ENCOUNTER (OUTPATIENT)
Dept: PHYSICAL THERAPY | Age: 85
Setting detail: THERAPIES SERIES
Discharge: HOME | End: 2020-12-29
Payer: MEDICARE

## 2020-01-01 ENCOUNTER — HOSPITAL ENCOUNTER (OUTPATIENT)
Dept: PHYSICAL THERAPY | Age: 85
Setting detail: THERAPIES SERIES
Discharge: HOME | End: 2020-12-18
Payer: MEDICARE

## 2020-01-01 ENCOUNTER — HOSPITAL ENCOUNTER (OUTPATIENT)
Dept: PHYSICAL THERAPY | Age: 85
Setting detail: THERAPIES SERIES
Discharge: HOME | End: 2020-12-23
Payer: MEDICARE

## 2020-01-01 ENCOUNTER — HOSPITAL ENCOUNTER (OUTPATIENT)
Dept: PHYSICAL THERAPY | Age: 85
Setting detail: THERAPIES SERIES
Discharge: HOME | End: 2020-12-10
Payer: MEDICARE

## 2020-01-01 ENCOUNTER — HOSPITAL ENCOUNTER (OUTPATIENT)
Dept: PHYSICAL THERAPY | Age: 85
Setting detail: THERAPIES SERIES
Discharge: HOME | End: 2020-12-02
Payer: MEDICARE

## 2020-01-01 VITALS
TEMPERATURE: 97.5 F | HEIGHT: 69 IN | BODY MASS INDEX: 20.44 KG/M2 | DIASTOLIC BLOOD PRESSURE: 68 MMHG | SYSTOLIC BLOOD PRESSURE: 114 MMHG | WEIGHT: 138 LBS | OXYGEN SATURATION: 99 % | HEART RATE: 74 BPM

## 2020-01-01 PROCEDURE — 1123F ACP DISCUSS/DSCN MKR DOCD: CPT | Performed by: INTERNAL MEDICINE

## 2020-01-01 PROCEDURE — 99204 OFFICE O/P NEW MOD 45 MIN: CPT | Performed by: INTERNAL MEDICINE

## 2020-01-01 PROCEDURE — 97110 THERAPEUTIC EXERCISES: CPT

## 2020-01-01 PROCEDURE — G8420 CALC BMI NORM PARAMETERS: HCPCS | Performed by: INTERNAL MEDICINE

## 2020-01-01 PROCEDURE — 4040F PNEUMOC VAC/ADMIN/RCVD: CPT | Performed by: INTERNAL MEDICINE

## 2020-01-01 PROCEDURE — G8427 DOCREV CUR MEDS BY ELIG CLIN: HCPCS | Performed by: INTERNAL MEDICINE

## 2020-01-01 PROCEDURE — 1036F TOBACCO NON-USER: CPT | Performed by: INTERNAL MEDICINE

## 2020-01-01 PROCEDURE — 97161 PT EVAL LOW COMPLEX 20 MIN: CPT

## 2020-01-01 ASSESSMENT — ENCOUNTER SYMPTOMS
CHOKING: 0
COUGH: 0
CONSTIPATION: 1
TROUBLE SWALLOWING: 0
CHEST TIGHTNESS: 0
SHORTNESS OF BREATH: 0
VOICE CHANGE: 0
WHEEZING: 0
ABDOMINAL PAIN: 1
STRIDOR: 0
COLOR CHANGE: 0

## 2020-09-18 NOTE — PROGRESS NOTES
Subjective:      Patient ID: Antonio Bello is a 80 y.o. male. CC: Lung nodule    HPI     Renaye Castleman comes to discuss abnormal CT chest findings referred by Dr Alysha Peterson. Found to have a LLL lung nodule in  has bee dutifully followed by the [facility] in [place] and Dr Shelly Cline with serial CT chest date  17, 2018, 10/10/19, 1/10/20, 6/15/2020. Although I have no access to the images the reports are available the nodule was measured at 4 mm in  and at 6 mm . I understand per Dr Jonathan Florence communication that a PET-CT done in 2016 did not reveal abnormal FDG uptake. Renaye Castleman is a remote smoker, quitting in , smoked for about 35 years, less than a pack a day. He reports that has been diagnosed with emphysema but on no medications as he is asymptomatic. Retired , no hazardous occupational exposures   Denies cough, SOB, chest pain, hemoptysis, wt loss.       Lung Nodule Screening     [] Qualifies    [x] Does not qualify   [] Declined   [] Completed  Past Medical History:   Diagnosis Date    Arthritis     Cataract     GERD (gastroesophageal reflux disease)     Senile osteoporosis      Past Surgical History:   Procedure Laterality Date    COLONOSCOPY  2010    EYE SURGERY      cataracts    NASAL SEPTUM SURGERY Left 2009     Social History     Tobacco Use    Smoking status: Former Smoker     Years: 0.00     Types: Cigarettes, Pipe, Cigars     Start date: 1950     Last attempt to quit: 1985     Years since quittin.7    Smokeless tobacco: Never Used   Substance Use Topics    Alcohol use: Yes     Comment: rarely    Drug use: No      No Known Allergies   Family History   Problem Relation Age of Onset   Atha Darien Stroke Mother    Athshen Ledezma Other Father         old age   Athshen Ledezma Other Sister         Cardiac arrest     Current Outpatient Medications   Medication Sig Dispense Refill    fluticasone (FLONASE) 50 MCG/ACT nasal spray       B Complex-C (SUPER B COMPLEX PO) Take 1 tablet by mouth three times a week      MAGNESIUM CITRATE PO Take 250 mg by mouth 2 times daily      Omega-3 Fatty Acids (FISH OIL PO) Take by mouth Fish oil 600 mg. + Omega 3 600 mg+Vitamin D 2000 units 1 tablet daily      aspirin 81 MG tablet Take 1 tablet by mouth See Admin Instructions 3 x weekly 1 tablet 0    omeprazole (PRILOSEC) 20 MG capsule Take 1 capsule by mouth every morning (before breakfast). omeprazole (Prilosec) 30 min. ac breakfast. 90 capsule 3    Multiple Vitamins-Iron (MULTIPLE VITAMIN/IRON PO) Take  by mouth daily. No current facility-administered medications for this visit. There were no vitals taken for this visit. Wt Readings from Last 3 Encounters:   11/05/18 146 lb 8 oz (66.5 kg)   10/15/18 146 lb (66.2 kg)   10/08/18 146 lb 12.8 oz (66.6 kg)     Neck Circumference - 15 in; Mallampati Score - 3      Review of Systems   Constitutional: Negative for fatigue and unexpected weight change. HENT: Negative for trouble swallowing and voice change. Eyes: Negative for visual disturbance. Respiratory: Negative for cough, choking, chest tightness, shortness of breath, wheezing and stridor. Cardiovascular: Negative for chest pain, palpitations and leg swelling. Gastrointestinal: Positive for abdominal pain and constipation. Endocrine: Negative. Genitourinary: Negative for difficulty urinating. Musculoskeletal: Positive for arthralgias. Negative for gait problem. Skin: Negative for color change. Neurological: Negative for dizziness, weakness and light-headedness. Hematological: Negative for adenopathy. Does not bruise/bleed easily. Psychiatric/Behavioral: Negative for sleep disturbance. Objective:   Physical Exam  Constitutional:       Appearance: Normal appearance. HENT:      Head: Normocephalic. Nose: Nose normal.      Mouth/Throat:      Mouth: Mucous membranes are moist.   Eyes:      Extraocular Movements: Extraocular movements intact. Pupils: Pupils are equal, round, and reactive to light. Neck:      Musculoskeletal: Normal range of motion and neck supple. Cardiovascular:      Rate and Rhythm: Normal rate. Pulses: Normal pulses. Heart sounds: Normal heart sounds. Pulmonary:      Effort: Pulmonary effort is normal.      Breath sounds: Normal breath sounds. Abdominal:      General: Abdomen is flat. Palpations: Abdomen is soft. Musculoskeletal:         General: No swelling. Right lower leg: No edema. Left lower leg: No edema. Neurological:      General: No focal deficit present. Mental Status: He is alert. Assessment:      Solitary 4 mm LLL pulmonary nodule that has reportedly enlarged to 6 mm in a span of 3 years (2017 - 2020)  Remote h/o smoking. Using [facility]'s  lung nodule risk assessment calculator this nodule has a 3.5% likelihood of being malignant. Plan:      Reassurance. Already scheduled at the [place] to have a f/u CT chest in one year and will come back to see me afterwards for review. I believe this is a very appropriate course of action, I would discourage biopsy or surgical resection, Yadira Jay agrees with the plan of care.

## 2020-12-02 NOTE — FLOWSHEET NOTE
** PLEASE SIGN, DATE AND TIME CERTIFICATION BELOW AND RETURN TO [facility] OUTPATIENT REHABILITATION (FAX #: [phone]). ATTEST/CO-SIGN IF ACCESSING VIA INRSP Tooling. THANK YOU.**    I certify that I have examined the patient below and determined that Physical Medicine and Rehabilitation service is necessary and that I approve the established plan of care for up to 90 days or as specifically noted. Attestation, signature or co-signature of physician indicates approval of certification requirements.    ________________________ ____________ __________  Physician Signature   Date   Time  [address] [facility]  PHYSICAL THERAPY  [x] EVALUATION  [] DAILY NOTE (LAND) [] DAILY NOTE (AQUATIC ) [] PROGRESS NOTE [] DISCHARGE NOTE    [x] [address] [facility]   [] [place] [address]    [] [facility]   [] Alexia Opitz    Date: 2020  Patient Name:  Ranjit Brown  : 1930  MRN: [id number]  CSN: [id number]    Referring Practitioner Adine Felty, MD   Diagnosis Unspecified kyphosis, site unspecified [M40.209]    Treatment Diagnosis Lumbago, spinal stiffness, B hip stiffness, LLE weakness, thoracic kyphosis abnormal posture   Date of Evaluation 20    Additional Pertinent History Hx of lumbar pain, 2013 lumbar L4/L5 fracture, non surgical healing. 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 2018 epidural with relief for about a year, repeated in  and 2020. Functional Outcome Measure Used Tinetti Balance and Gait assessment   Functional Outcome Score 21/28 Moderate fall risk (20)       Insurance: Primary: Payor: Heather Roman /  /  / ,   Secondary: HUMANA   Authorization Information: 20% copay, secondary humana. Aquatic and modalities covered. Plans to change his secondary coverage 2020.     Visit # 1, 1/10 for progress note   Visits Allowed: Unlimited pain   Recertification Date:  Physician Follow-Up: unknown   Physician Orders: PT order 11/19/2020   History of Present Illness: Since having epidural in January 2020 he had relief of lumbar pain. He lifted 60 lb bag of play sand around September and his pain has been elevated since, developed forward bend posture of kyphosis. November 3 SI epidural again. Family doctor referred to PT for kyphosis. SUBJECTIVE: Patient reporting low back pain, forward flexed posture. He is having difficulty with pushing heavy shopping cart, rates pain 2/10. His pain gets elevated with walking long durations without walking stick or support. He uses cane for long distances or supporting with shopping cart. When he walks without support, he has forward bent posture. Patient has been performing some of PT exercises in the past.     Social/Functional History and Current Status:  Medications and Allergies have been reviewed and are listed on Medical History Questionnaire. Nando Sánchez lives with spouse in a multiple floor home with stairs and no handrail to enter. 1/2 bath on 1st floor, laundry in basement, bed bath upstairs. Task Previous Current   ADLs  Independent Independent   IADL's Independent Modified Independent - clothing stores or large distances uses cane,  helps with heavier   Ambulation Independent Modified Independent - uses single point cane    Transfers Independent Independent   Recreation Independent Independent   [employer] membership, swims 300m 2-3x week, breast stroke. Independent   Driving Active  Active    Work Retired  Retired     OBJECTIVE:    Pain: Lumbar and L lateral thigh tenderness 2/10. Palpation L4/L5 tenderness and lateral thigh tenderness    Observation    Posture Thoracic kyphosis, B genu varus deformity.         Range of Motion SKTC Left 0-120 deg, Right WFL  Hamstring 0-80 deg B, no radicular pain or back pain  Piriformis tighter L>R  Hip flexors tight, hip ext limited to neutral  Trunk extension unable to achieve neutral spine. Strength LLE weakness - L hip flex 4-/5, knee ext 4+/5, ankle DF 4/5   Coordination    Sensation WFL   Bed Mobility    Transfers    Ambulation Decreased gait speed, mild trendelenburg pattern in stance and mild path deviation    Stairs 4 steps reciprocal pattern, at home he ascends his steps with nonreciprocal pattern. Descending steps with reciprocal pattern. Balance See Tinetti   Special Tests Negative hip scour bilaterally,    Body mechanics Squat with BUE on stair railings, needing Mod A to rise up from full squat. Unable to use squat technique during home activities due to knee OA and leg weakness. TINETTI BALANCE TEST    BALANCE Patient is seated in a hard, armless chair   1 SITTING BALANCE 1 - Steady, safe   2. RISES FROM CHAIR 1 - Able, uses arms to help   3. ATTEMPTS TO RISE 1 - Able, requires > 1 attempt   4. IMMEDIATE STANDING BALANCE (FIRST 5 SECONDS) 2 - Steady without walker or other support   5. STANDING BALANCE 1 - Steady but wide stance and uses support   6. NUDGED 2 - Steady   7. EYES CLOSED 1 - Steady   8. TURNING 360 DEGREES 1 - Continuous and 1 - Steady   9. SITTING DOWN 1 - Uses arms or not a smooth motion   BALANCE SCORE 12/16       GAIT SECTION Patient stands with therapist, walks across room (+/- aids), fist at usual pace, then at rapid pace. 1.  INDICATION OF GAIT (Immediately after told to \"go\" 1 - No hesitancy   2. STEP LENGTH AND HEIGHT 1 - Step through Right and 1 - Step through Left   3. FOOT CLEARANCE 1 - Left foot clears floor and 1 - Right foot clears floor   4. STEP SYMMETRY 1 - Right and left step length appear equal   5. STEP CONTINUITY 1 - Steps appear continuous   6. PATH 1 - Mild/moderate deviation or uses walking aid   7. TRUNK 1 - No sway but flexes knees or back or uses arms for stability   8.   WALKING TIME 0 - Heels apart   GAIT SCORE 9/12   TOTAL SCORE 21/28   Risk Indicators:  Less than/equal to 18 = high risk  19-23 Moderate risk  Greater than/equal to 24 = low risk Moderate fall risk           TREATMENT   Precautions:    Pain: Lumbar pain, LLE weakness, thoracic kyphosis posture    X in shaded column indicates activity completed today   Modalities Parameters/  Location  Notes                     Manual Therapy Time/Technique  Notes                     Exercise/Intervention   Notes   Hooklying piriformis stretch bilaterally 2x 10 sec x    Reviewed previous HEP from prior PT care -  Standing trunk extension against wall for upright posture  Ankle pump AROM, standing balance tasks   x                                                                     Specific Interventions Next Treatment: NuStep warm up, Posterior tilt standing against wall, thoracic extension seated with ball behind back, hooklying bridge, hip flexor off edge, piriformis stretch, longitudinal hip ER/IR, sidelying clamshell, IT band stretching     Activity/Treatment Tolerance:  [x]  Patient tolerated treatment well  []  Patient limited by fatigue  []  Patient limited by pain   []  Patient limited by medical complications  []  Other:     Assessment: Patient presents with back pain, LLE weakness, and postural abnormalities including thoracic kyphosis, B genu varus, and forward flexed trunk especially present when walking without device. His walking endurance is limited and he has been using a cane or shopping cart for support when out in the community. He is motivated to improve his posture and standing balance in order to decrease risk of falling when not using cane. He would benefit from physical therapy in order to increase strength in LLE, improve postural alignment of thoracolumbar spine, and improve core abdominal bracing for spinal stability during household and community tasks.      Body Structures/Functions/Activity Limitations: impaired activity tolerance, impaired balance, impaired ROM, impaired strength, pain and abnormal gait  Prognosis: good    GOALS:  Patient Goal: decrease back pain, walk more upright    Short Term Goals:  Time Frame: 6 weeks    Patient will report decreased Lumbar pain from baseline 2/10 to less than 1/10 during therapy exercises in order to stand and walk with upright posture. Patient will improve BLE AROM to 0-90 deg hamstring, 0-120 deg piriformis stretch, 0-15 deg hip extension in order to bend and lift without straining lumbar spine. Patient will demonstrate good abdominal bracing and body mechanics during therapy session in order to preserve neutral spine during household tasks. Patient will be IND with HEP in order to meet long term goals. Long Term Goals:  Time Frame: 12 weeks    Patient will improve score of Tinetti balance and gait assessment from baseline 21/28 to at least 24/28 in order to decrease risk of falling with functional mobility. Patient will squat to lift 10 lb weight from floor to standing with good body mechanics in order to perform household tasks and place items in grocery cart. Patient will improve LLE strength to 4+/5 in order to squat, lunge, and navigate steps without difficulty. Patient Education:   [x]  HEP/Education Completed: Plan of Care, Goals, Posterior tilt standing against wall, hooklying bridge, hip flexor off edge, piriformis stretch, longitudinal hip ER/IR, sidelying clamshell  Medbridge Access Code: [email or web address]   []  No new Education completed  []  Reviewed Prior HEP      [x]  Patient verbalized and/or demonstrated understanding of education provided. []  Patient unable to verbalize and/or demonstrate understanding of education provided. Will continue education.   []  Barriers to learning: none    PLAN:  Treatment Recommendations: Strengthening, Range of Motion, Balance Training, Functional Mobility Training, Transfer Training, Endurance Training, Gait Training, Stair Training, Manual Therapy - Soft Tissue Mobilization, Manual Therapy - Joint Manipulation, Pain Management, Home Exercise Program, Patient Education, Safety Education and Training, Integrative Dry Needling, Aquatics and Modalities    [x]  Plan of care initiated. Plan to see patient 2 times per week for 12 weeks to address the treatment planned outlined above.   []  Continue with current plan of care  []  Modify plan of care as follows:    []  Hold pending physician visit  []  Discharge    Time In 1330   Time Out 1430   Timed Code Minutes: 10 min   Total Treatment Time: 60 min       Electronically Signed by: Romain Newman

## 2020-12-04 NOTE — PROGRESS NOTES
[address] [facility]  PHYSICAL THERAPY  [] EVALUATION  [x] DAILY NOTE (LAND) [] DAILY NOTE (AQUATIC ) [] PROGRESS NOTE [] DISCHARGE NOTE    [x] OUTPATIENT REHABILITATION CENTER [facility]   [] [place] [address]    [] [facility]   [] Maxwell Cuevas    Date: 2020  Patient Name:  Lyly Vo  : 1930  MRN: [id number]  CSN: [id number]    Referring Practitioner Halle Millan MD   Diagnosis Unspecified kyphosis, site unspecified [M40.209]    Treatment Diagnosis Lumbago, spinal stiffness, B hip stiffness, LLE weakness, thoracic kyphosis abnormal posture   Date of Evaluation 20    Additional Pertinent History Hx of lumbar pain,  lumbar L4/L5 fracture, non surgical healing. 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 2018 epidural with relief for about a year, repeated in  and 2020. Patient reports left lateral thigh and anterior thigh has pins/needles. EMG Moderate L5 radiculopathy. Dr. 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 Functional Outcome Measure Used Tinetti Balance and Gait assessment    Functional Outcome Score 21/28 Moderate fall risk (20)       Insurance: Primary: Payor: Rich Cooks /  /  / ,   Secondary: HUMANA   Authorization Information: 20% copay, secondary humana. Aquatic and modalities covered. Plans to change his secondary coverage 2020. Visit # 2, 2/10 for progress note   Visits Allowed: Unlimited pain   Recertification Date: 3/00/3832   Physician Follow-Up: unknown   Physician Orders: PT order 2020   History of Present Illness: Since having epidural in 2020 he had relief of lumbar pain. He lifted 60 lb bag of play sand around September and his pain has been elevated since, developed forward bend posture of kyphosis. November 3 SI epidural again. Family doctor referred to PT for kyphosis. SUBJECTIVE: Patient denies increased pain after initial assessment. Still has mild lumbar pain and forward bent posture with standing and walking. Patient is interested in receiving another EMG due to it being 2 years since the previous one. 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 TREATMENT   Precautions:    Pain: Lumbar pain, LLE weakness, thoracic kyphosis posture    X in shaded column indicates activity completed today   Modalities Parameters/  Location  Notes                     Manual Therapy Time/Technique  Notes                     Exercise/Intervention   Notes   Hooklying piriformis stretch bilaterally  2x 10 sec x    Seated trunk extension with ball behind back 5x 10 sec x Cues for upright posture   Standing cane AAROM shoulder extension  3x 10 sec x Tactile cues on chest and back for posture   Longitudinal ER/IR  10x ea  x    Supine bridge 10x 5 sec x    Supine hip flexor stretch off edge of plinth       Sidelying clamshell, leg raise       SLR               Standing balance activities         Specific Interventions Next Treatment: NuStep warm up, Posterior tilt standing against wall, thoracic extension seated with ball behind back, hooklying bridge, hip flexor off edge, piriformis stretch, longitudinal hip ER/IR, sidelying clamshell, IT band stretching     Activity/Treatment Tolerance:  [x]  Patient tolerated treatment well  []  Patient limited by fatigue  []  Patient limited by pain   []  Patient limited by medical complications  []  Other:     Assessment: Patient able to tolerate upper thoracic extension stretching and abdominal bracing activities with good tolerance. No production of lateral thigh paresthesias or back pain today.      GOALS:  Patient Goal: decrease back pain, walk more upright    Short Term Goals:  Time Frame: 6 weeks    Patient will report decreased Lumbar pain from baseline 2/10 to less than 1/10 during therapy exercises in order to stand and walk with upright posture. Patient will improve BLE AROM to 0-90 deg hamstring, 0-120 deg piriformis stretch, 0-15 deg hip extension in order to bend and lift without straining lumbar spine. Patient will demonstrate good abdominal bracing and body mechanics during therapy session in order to preserve neutral spine during household tasks. Patient will be IND with HEP in order to meet long term goals. Long Term Goals:  Time Frame: 12 weeks    Patient will improve score of Tinetti balance and gait assessment from baseline 21/28 to at least 24/28 in order to decrease risk of falling with functional mobility. Patient will squat to lift 10 lb weight from floor to standing with good body mechanics in order to perform household tasks and place items in grocery cart. Patient will improve LLE strength to 4+/5 in order to squat, lunge, and navigate steps without difficulty. Patient Education:   [x]  HEP/Education Completed: Plan of Care, Goals, Posterior tilt standing against wall, hooklying bridge, hip flexor off edge, piriformis stretch, longitudinal hip ER/IR, sidelying clamshell   Medbridge Access Code: [email or web address]   []  No new Education completed  []  Reviewed Prior HEP      [x]  Patient verbalized and/or demonstrated understanding of education provided. []  Patient unable to verbalize and/or demonstrate understanding of education provided. Will continue education. []  Barriers to learning: none    PLAN:  Treatment Recommendations: Strengthening, Range of Motion, Balance Training, Functional Mobility Training, Transfer Training, Endurance Training, Gait Training, Stair Training, Manual Therapy - Soft Tissue Mobilization, Manual Therapy - Joint Manipulation, Pain Management, Home Exercise Program, Patient Education, Safety Education and Training, Integrative Dry Needling, Aquatics and Modalities    [x]  Plan of care initiated.   Plan to see patient 2 times per week for 12 weeks to address the treatment planned outlined above.   []  Continue with current plan of care  []  Modify plan of care as follows:    []  Hold pending physician visit  []  Discharge    Time In [phone] 21   Time Out 1430   Timed Code Minutes: 43   Total Treatment Time: 43       Electronically Signed by: Mirian Stewart

## 2020-12-04 NOTE — FLOWSHEET NOTE
** PLEASE SIGN, DATE AND TIME CERTIFICATION BELOW AND RETURN TO [facility] OUTPATIENT REHABILITATION (FAX #: [phone]). ATTEST/CO-SIGN IF ACCESSING VIA INmPura. THANK YOU.**    I certify that I have examined the patient below and determined that Physical Medicine and Rehabilitation service is necessary and that I approve the established plan of care for up to 90 days or as specifically noted. Attestation, signature or co-signature of physician indicates approval of certification requirements.    ________________________ ____________ __________  Physician Signature   Date   Time  [address] [facility]  PHYSICAL THERAPY  [] EVALUATION  [x] DAILY NOTE (LAND) [] DAILY NOTE (AQUATIC ) [] PROGRESS NOTE [] DISCHARGE NOTE    [x] [address] [facility]   [] [place] [address]    [] [facility]   [] [facility]    Date: 2020  Patient Name:  Jolly Begum  : 1930  MRN: [id number]  CSN: [id number]    Referring Practitioner Tomi Polanco MD   Diagnosis Unspecified kyphosis, site unspecified [M40.209]    Treatment Diagnosis Lumbago, spinal stiffness, B hip stiffness, LLE weakness, thoracic kyphosis abnormal posture   Date of Evaluation 20    Additional Pertinent History Hx of lumbar pain, 2013 lumbar L4/L5 fracture, non surgical healing. 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 2018 epidural with relief for about a year, repeated in  and 2020. Patient reports left lateral thigh and anterior thigh has pins/needles. EMG Moderate L5 radiculopathy. Dr. 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        Functional Outcome Measure Used Tinetti Balance and Gait assessment    Functional Outcome Score 21/28 Moderate fall risk (20)       Insurance: Primary: Payor: Delwyn Osler /  /  / ,   Secondary: HUMANA   Authorization Information: 20% copay, secondary humana. Aquatic and modalities covered. Plans to change his secondary coverage 12/31/2020. Visit # 2, 2/10 for progress note   Visits Allowed: Unlimited pain   Recertification Date: 8/85/0968   Physician Follow-Up: unknown   Physician Orders: PT order 11/19/2020   History of Present Illness: Since having epidural in January 2020 he had relief of lumbar pain. He lifted 60 lb bag of play sand around September and his pain has been elevated since, developed forward bend posture of kyphosis. November 3 SI epidural again. Family doctor referred to PT for kyphosis. SUBJECTIVE: Patient denies increased pain after initial assessment. Still has mild lumbar pain and forward bent posture with standing and walking. Patient is interested in receiving another EMG due to it being 2 years since the previous one. 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      TREATMENT   Precautions:    Pain: Lumbar pain, LLE weakness, thoracic kyphosis posture    X in shaded column indicates activity completed today   Modalities Parameters/  Location  Notes                     Manual Therapy Time/Technique  Notes                     Exercise/Intervention   Notes   Hooklying piriformis stretch bilaterally  2x 10 sec x    Seated trunk extension with ball behind back 5x 10 sec x Cues for upright posture   Standing cane AAROM shoulder extension  3x 10 sec x Tactile cues on chest and back for posture   Longitudinal ER/IR  10x ea  x    Supine bridge 10x 5 sec x    Supine hip flexor stretch off edge of plinth       Sidelying clamshell, leg raise       SLR               Standing balance activities         Specific Interventions Next Treatment: NuStep warm up, Posterior tilt standing against wall, thoracic extension seated with ball behind back, hooklying bridge, hip flexor off edge, piriformis stretch, longitudinal hip ER/IR, sidelying clamshell, IT band stretching     Activity/Treatment Tolerance:  [x]  Patient tolerated treatment well  []  Patient limited by fatigue  []  Patient limited by pain   []  Patient limited by medical complications  []  Other:     Assessment: Patient able to tolerate upper thoracic extension stretching and abdominal bracing activities with good tolerance. No production of lateral thigh paresthesias or back pain today. GOALS:  Patient Goal: decrease back pain, walk more upright    Short Term Goals:  Time Frame: 6 weeks    Patient will report decreased Lumbar pain from baseline 2/10 to less than 1/10 during therapy exercises in order to stand and walk with upright posture. Patient will improve BLE AROM to 0-90 deg hamstring, 0-120 deg piriformis stretch, 0-15 deg hip extension in order to bend and lift without straining lumbar spine. Patient will demonstrate good abdominal bracing and body mechanics during therapy session in order to preserve neutral spine during household tasks. Patient will be IND with HEP in order to meet long term goals. Long Term Goals:  Time Frame: 12 weeks    Patient will improve score of Tinetti balance and gait assessment from baseline 21/28 to at least 24/28 in order to decrease risk of falling with functional mobility. Patient will squat to lift 10 lb weight from floor to standing with good body mechanics in order to perform household tasks and place items in grocery cart. Patient will improve LLE strength to 4+/5 in order to squat, lunge, and navigate steps without difficulty. Patient Education:   [x]  HEP/Education Completed: Plan of Care, Goals, Posterior tilt standing against wall, hooklying bridge, hip flexor off edge, piriformis stretch, longitudinal hip ER/IR, sidelying clamshell   Medbridge Access Code: [email or web address]   []  No new Education completed  []  Reviewed Prior HEP      [x]  Patient verbalized and/or demonstrated understanding of education provided.   [] Patient unable to verbalize and/or demonstrate understanding of education provided. Will continue education. []  Barriers to learning: none    PLAN:  Treatment Recommendations: Strengthening, Range of Motion, Balance Training, Functional Mobility Training, Transfer Training, Endurance Training, Gait Training, Stair Training, Manual Therapy - Soft Tissue Mobilization, Manual Therapy - Joint Manipulation, Pain Management, Home Exercise Program, Patient Education, Safety Education and Training, Integrative Dry Needling, Aquatics and Modalities    [x]  Plan of care initiated. Plan to see patient 2 times per week for 12 weeks to address the treatment planned outlined above.   []  Continue with current plan of care  []  Modify plan of care as follows:    []  Hold pending physician visit  []  Discharge    Time In 0345 74 47 21   Time Out 1430   Timed Code Minutes: 43   Total Treatment Time: 43       Electronically Signed by: Christian Puentes

## 2020-12-10 NOTE — PROGRESS NOTES
[address] [facility]  PHYSICAL THERAPY  [] EVALUATION  [x] DAILY NOTE (LAND) [] DAILY NOTE (AQUATIC ) [] PROGRESS NOTE [] DISCHARGE NOTE    [x] OUTPATIENT REHABILITATION CENTER [facility]   [] [place] [address]    [] [facility]   [] [facility]    Date: 12/10/2020  Patient Name:  Tonio Burnham  : 1930  MRN: [id number]  CSN: [id number]    Referring Practitioner Clifton Cool MD   Diagnosis Unspecified kyphosis, site unspecified [M40.209]    Treatment Diagnosis Lumbago, spinal stiffness, B hip stiffness, LLE weakness, thoracic kyphosis abnormal posture   Date of Evaluation 20    Additional Pertinent History Hx of lumbar pain,  lumbar L4/L5 fracture, non surgical healing. 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 2018 epidural with relief for about a year, repeated in  and 2020. Patient reports left lateral thigh and anterior thigh has pins/needles. EMG Moderate L5 radiculopathy. Dr. 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 Functional Outcome Measure Used Tinetti Balance and Gait assessment    Functional Outcome Score 21/28 Moderate fall risk (20)       Insurance: Primary: Payor: Elvin Georges /  /  / ,   Secondary: HU[employer]NA   Authorization Information: 20% copay, secondary humana. Aquatic and modalities covered. Plans to change his secondary coverage 2020. Visit # 3, 3/10 for progress note   Visits Allowed: Unlimited pain   Recertification Date:    Physician Follow-Up: unknown   Physician Orders: PT order 2020   History of Present Illness: Since having epidural in 2020 he had relief of lumbar pain. He lifted 60 lb bag of play sand around September and his pain has been elevated since, developed forward bend posture of kyphosis. November 3 SI epidural again. Family doctor referred to PT for kyphosis. SUBJECTIVE: Patient has been performing HEP this week and feels that his posture is improving. Denies back pain today. His wife has been giving overpressure for seated pectoralis stretch retracting scapulae. TREATMENT   Precautions:    Pain: Lumbar pain, LLE weakness, thoracic kyphosis posture    X in shaded column indicates activity completed today   Modalities Parameters/  Location  Notes                     Manual Therapy Time/Technique  Notes                     Exercise/Intervention   Notes   NuStep warm up Seat 8 arms 10 5 min  x           Hooklying piriformis stretch bilaterally  2x 20 sec x    Seated trunk extension with ball behind back  5x 10 sec  Cues for upright posture   Standing cane AAROM shoulder extension  3x 10 sec  Tactile cues on chest and back for posture   Longitudinal ER/IR bilateral 10x ea  x    Supine bridge  15x  x    Supine hip flexor stretch off edge of plinth  2x 20 sec x    Sidelying clamshell  15x  x    SLR               Standing balance activities       Tandem stance 2x  15 sec x    Blue foam normal stance - head turns lateral/vertical 15x  x             Specific Interventions Next Treatment: NuStep warm up, Posterior tilt standing against wall, thoracic extension seated with ball behind back, hooklying bridge, hip flexor off edge, piriformis stretch, longitudinal hip ER/IR, sidelying clamshell, IT band stretching     Activity/Treatment Tolerance:  [x]  Patient tolerated treatment well  []  Patient limited by fatigue  []  Patient limited by pain   []  Patient limited by medical complications  []  Other:     Assessment: Progressed trunk stabilization and hip strengthening today with good tolerance.  Able to correct upright posture during standing activities but frequent LOB noted especially on blue foam.     GOALS:  Patient Goal: decrease back pain, walk more upright    Short Term Goals:  Time Frame: 6 weeks    Patient will report decreased Lumbar pain from baseline 2/10 to less than 1/10 during therapy exercises in order to stand and walk with upright posture. Patient will improve BLE AROM to 0-90 deg hamstring, 0-120 deg piriformis stretch, 0-15 deg hip extension in order to bend and lift without straining lumbar spine. Patient will demonstrate good abdominal bracing and body mechanics during therapy session in order to preserve neutral spine during household tasks. Patient will be IND with HEP in order to meet long term goals. Long Term Goals:  Time Frame: 12 weeks    Patient will improve score of Tinetti balance and gait assessment from baseline 21/28 to at least 24/28 in order to decrease risk of falling with functional mobility. Patient will squat to lift 10 lb weight from floor to standing with good body mechanics in order to perform household tasks and place items in grocery cart. Patient will improve LLE strength to 4+/5 in order to squat, lunge, and navigate steps without difficulty. Patient Education:   []  HEP/Education Completed: Plan of Care, Goals, Posterior tilt standing against wall, hooklying bridge, hip flexor off edge, piriformis stretch, longitudinal hip ER/IR, sidelying clamshell   Medbridge Access Code: [email or web address]   []  No new Education completed  [x]  Reviewed Prior HEP      [x]  Patient verbalized and/or demonstrated understanding of education provided. []  Patient unable to verbalize and/or demonstrate understanding of education provided. Will continue education. []  Barriers to learning: none    PLAN:  Treatment Recommendations: Strengthening, Range of Motion, Balance Training, Functional Mobility Training, Transfer Training, Endurance Training, Gait Training, Stair Training, Manual Therapy - Soft Tissue Mobilization, Manual Therapy - Joint Manipulation, Pain Management, Home Exercise Program, Patient Education, Safety Education and Training, Integrative Dry Needling, Aquatics and Modalities    [x]  Plan of care initiated.   Plan to see patient 2 times per week for 12 weeks to address the treatment planned outlined above.   []  Continue with current plan of care  []  Modify plan of care as follows:    []  Hold pending physician visit  []  Discharge    Time In 1500   Time Out 1545   Timed Code Minutes: 45   Total Treatment Time: 45       Electronically Signed by: Christian Puentes

## 2020-12-11 NOTE — PROGRESS NOTES
[address] [facility]  PHYSICAL THERAPY  [] EVALUATION  [x] DAILY NOTE (LAND) [] DAILY NOTE (AQUATIC ) [] PROGRESS NOTE [] DISCHARGE NOTE    [x] OUTPATIENT REHABILITATION CENTER [facility]   [] [place] [address]    [] [facility]   [] Cristobal Arechiga    Date: 2020  Patient Name:  Eldon Cole  : 1930  MRN: [id number]  CSN: [id number]    Referring Practitioner Merissa Martinez MD   Diagnosis Unspecified kyphosis, site unspecified [M40.209]    Treatment Diagnosis Lumbago, spinal stiffness, B hip stiffness, LLE weakness, thoracic kyphosis abnormal posture   Date of Evaluation 20    Additional Pertinent History Hx of lumbar pain,  lumbar L4/L5 fracture, non surgical healing. 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 2018 epidural with relief for about a year, repeated in  and 2020. Patient reports left lateral thigh and anterior thigh has pins/needles. EMG Moderate L5 radiculopathy causing foot drop. Dr. 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 Functional Outcome Measure Used Tinetti Balance and Gait assessment    Functional Outcome Score 21/28 Moderate fall risk (20)       Insurance: Primary: Payor: Sondra Mccoy /  /  / ,   Secondary: HUMANA   Authorization Information: 20% copay, secondary humana. Aquatic and modalities covered. Plans to change his secondary coverage 2020. Visit # 4, 4/10 for progress note   Visits Allowed: Unlimited pain   Recertification Date:    Physician Follow-Up: unknown   Physician Orders: PT order 2020   History of Present Illness: Since having epidural in 2020 he had relief of lumbar pain. He lifted 60 lb bag of play sand around September and his pain has been elevated since, developed forward bend posture of kyphosis. November 3 SI epidural again.  Family doctor referred to PT for kyphosis. SUBJECTIVE: Patient denies back pain, was not sore after last session but he could feel the exercise. He notes the left leg is still weaker than the right, has thigh paresthesias, foot drop chronically and has been told conflicting diagnoses for this. TREATMENT   Precautions:    Pain: Lumbar pain, LLE weakness, thoracic kyphosis posture    X in shaded column indicates activity completed today   Modalities Parameters/  Location  Notes                     Manual Therapy Time/Technique  Notes                     Exercise/Intervention   Notes   NuStep warm up Seat 8 arms 10 5 min  x                  Seated trunk extension with ball behind back  5x 10 sec  Cues for upright posture   Standing cane AAROM shoulder extension  3x 10 sec  Tactile cues on chest and back for posture          Hooklying piriformis stretch bilaterally  2x 20 sec x    Longitudinal ER/IR bilateral 10x ea  x    Supine bridge  15x  x    Supine hip flexor stretch off edge of plinth  2x 20 sec x    Sidelying clamshell  15x  x    SLR               Standing balance activities       Tandem stance 2x  15 sec x    Blue foam normal stance - head turns lateral/vertical 15x  x             Specific Interventions Next Treatment: NuStep warm up, Posterior tilt standing against wall, thoracic extension seated with ball behind back, hooklying bridge, hip flexor off edge, piriformis stretch, longitudinal hip ER/IR, sidelying clamshell, IT band stretching     Activity/Treatment Tolerance:  [x]  Patient tolerated treatment well  []  Patient limited by fatigue  []  Patient limited by pain   []  Patient limited by medical complications  []  Other:     Assessment: Continued with standing balance and abdominal bracing and stabilization activities with good tolerance, denies pain at end of session.      GOALS:  Patient Goal: decrease back pain, walk more upright    Short Term Goals:  Time Frame: 6 weeks    Patient will report decreased Lumbar pain from baseline 2/10 to less than 1/10 during therapy exercises in order to stand and walk with upright posture. Patient will improve BLE AROM to 0-90 deg hamstring, 0-120 deg piriformis stretch, 0-15 deg hip extension in order to bend and lift without straining lumbar spine. Patient will demonstrate good abdominal bracing and body mechanics during therapy session in order to preserve neutral spine during household tasks. Patient will be IND with HEP in order to meet long term goals. Long Term Goals:  Time Frame: 12 weeks    Patient will improve score of Tinetti balance and gait assessment from baseline 21/28 to at least 24/28 in order to decrease risk of falling with functional mobility. Patient will squat to lift 10 lb weight from floor to standing with good body mechanics in order to perform household tasks and place items in grocery cart. Patient will improve LLE strength to 4+/5 in order to squat, lunge, and navigate steps without difficulty. Patient Education:   []  HEP/Education Completed: Plan of Care, Goals, Posterior tilt standing against wall, hooklying bridge, hip flexor off edge, piriformis stretch, longitudinal hip ER/IR, sidelying clamshell   Medbridge Access Code: [email or web address]   []  No new Education completed  [x]  Reviewed Prior HEP      [x]  Patient verbalized and/or demonstrated understanding of education provided. []  Patient unable to verbalize and/or demonstrate understanding of education provided. Will continue education.   []  Barriers to learning: none    PLAN:  Treatment Recommendations: Strengthening, Range of Motion, Balance Training, Functional Mobility Training, Transfer Training, Endurance Training, Gait Training, Stair Training, Manual Therapy - Soft Tissue Mobilization, Manual Therapy - Joint Manipulation, Pain Management, Home Exercise Program, Patient Education, Safety Education and Training, Integrative Dry Needling, Aquatics and Modalities    [x]  Plan of care initiated. Plan to see patient 2 times per week for 12 weeks to address the treatment planned outlined above.   []  Continue with current plan of care  []  Modify plan of care as follows:    []  Hold pending physician visit  []  Discharge    Time In [phone]   Time Out 1630   Timed Code Minutes: 45   Total Treatment Time: 45       Electronically Signed by: Amanda Adamson

## 2020-12-16 NOTE — PROGRESS NOTES
7[address] [facility]  PHYSICAL THERAPY  [] EVALUATION  [x] DAILY NOTE (LAND) [] DAILY NOTE (AQUATIC ) [] PROGRESS NOTE [] DISCHARGE NOTE    [x] OUTPATIENT REHABILITATION CENTER [facility]   [] [place] [address]    [] [facility]   [] [facility]    Date: 2020  Patient Name:  Michael Perez  : 1930  MRN: [id number]  CSN: [id number]    Referring Practitioner Gurpo Allen MD   Diagnosis Unspecified kyphosis, site unspecified [M40.209]    Treatment Diagnosis Lumbago, spinal stiffness, B hip stiffness, LLE weakness, thoracic kyphosis abnormal posture   Date of Evaluation 20    Additional Pertinent History Hx of lumbar pain,  lumbar L4/L5 fracture, non surgical healing. 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 2018 epidural with relief for about a year, repeated in  and 2020. Patient reports left lateral thigh and anterior thigh has pins/needles. EMG Moderate L5 radiculopathy causing foot drop. Dr. 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 Functional Outcome Measure Used Tinetti Balance and Gait assessment    Functional Outcome Score 21/28 Moderate fall risk (20)       Insurance: Primary: Payor: [employer] /  /  / ,   Secondary: HUMANA   Authorization Information: 20% copay, secondary humana. Aquatic and modalities covered. Plans to change his secondary coverage 2020.     Visit # 5, 5/10 for progress note   Visits Allowed: Unlimited pain   Recertification Date: [id number]   Physician Follow-Up: unknown   Physician Orders: PT order 2020 GOALS:  Patient Goal: decrease back pain, walk more upright    Short Term Goals:  Time Frame: 6 weeks    Patient will report decreased Lumbar pain from baseline 2/10 to less than 1/10 during therapy exercises in order to stand and walk with upright posture. Patient will improve BLE AROM to 0-90 deg hamstring, 0-120 deg piriformis stretch, 0-15 deg hip extension in order to bend and lift without straining lumbar spine. Patient will demonstrate good abdominal bracing and body mechanics during therapy session in order to preserve neutral spine during household tasks. Patient will be IND with HEP in order to meet long term goals. Long Term Goals:  Time Frame: 12 weeks    Patient will improve score of Tinetti balance and gait assessment from baseline 21/28 to at least 24/28 in order to decrease risk of falling with functional mobility. Patient will squat to lift 10 lb weight from floor to standing with good body mechanics in order to perform household tasks and place items in grocery cart. Patient will improve LLE strength to 4+/5 in order to squat, lunge, and navigate steps without difficulty. Patient Education:   []  HEP/Education Completed: Plan of Care, Goals, Posterior tilt standing against wall, hooklying bridge, hip flexor off edge, piriformis stretch, longitudinal hip ER/IR, [facility]   [facility] Access Code: [email or web address]   []  No new Education completed  [x]  Reviewed Prior HEP      [x]  Patient verbalized and/or demonstrated understanding of education provided. []  Patient unable to verbalize and/or demonstrate understanding of education provided. Will continue education.   []  Barriers to learning: none    PLAN: Treatment Recommendations: Strengthening, Range of Motion, Balance Training, Functional Mobility Training, Transfer Training, Endurance Training, Gait Training, Stair Training, Manual Therapy - Soft Tissue Mobilization, Manual Therapy - Joint Manipulation, Pain Management, Home Exercise Program, Patient Education, Safety Education and Training, Integrative Dry Needling, Aquatics and Modalities    [x]  Plan of care initiated. Plan to see patient 2 times per week for 12 weeks to address the treatment planned outlined above.   []  Continue with current plan of care  []  Modify plan of care as follows:    []  Hold pending physician visit  []  Discharge    Time In 1500   Time Out 1545   Timed Code Minutes: 45   Total Treatment Time: 45       Electronically Signed by: David Lowry

## 2020-12-18 NOTE — PROGRESS NOTES
[address] [facility]  PHYSICAL THERAPY  [] EVALUATION  [x] DAILY NOTE (LAND) [] DAILY NOTE (AQUATIC ) [] PROGRESS NOTE [] DISCHARGE NOTE    [x] OUTPATIENT REHABILITATION CENTER [facility]   [] [place] [address]    [] [facility]   [] Laura Perez    Date: 2020  Patient Name:  Carmencita Umaña  : 1930  MRN: [id number]  CSN: [id number]    Referring Practitioner Atiya Owen MD   Diagnosis Unspecified kyphosis, site unspecified [M40.209]    Treatment Diagnosis Lumbago, spinal stiffness, B hip stiffness, LLE weakness, thoracic kyphosis abnormal posture   Date of Evaluation 20    Additional Pertinent History Hx of lumbar pain,  lumbar L4/L5 fracture, non surgical healing. 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 2018 epidural with relief for about a year, repeated in  and 2020. Patient reports left lateral thigh and anterior thigh has pins/needles. EMG Moderate L5 radiculopathy causing foot drop. Dr. 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 Functional Outcome Measure Used Tinetti Balance and Gait assessment    Functional Outcome Score 21/28 Moderate fall risk (20)       Insurance: Primary: Payor: [employer] /  /  / ,   Secondary: HUMANA   Authorization Information: 20% copay, secondary humana. Aquatic and modalities covered. Plans to change his secondary coverage 2020.     Visit # 6, 6/10 for progress note    Visits Allowed: Unlimited pain   Recertification Date:    Physician Follow-Up: unknown   Physician Orders: PT order 2020 Assessment: Held standing tasks today per patient request as he is fatigued from shopping yesterday. Progressed flexibility and thoracic extension supine with no pillow, cues for scapular retraction and pectoral stretch in this position. GOALS:  Patient Goal: decrease back pain, walk more upright    Short Term Goals:  Time Frame: 6 weeks    Patient will report decreased Lumbar pain from baseline 2/10 to less than 1/10 during therapy exercises in order to stand and walk with upright posture. Patient will improve BLE AROM to 0-90 deg hamstring, 0-120 deg piriformis stretch, 0-15 deg hip extension in order to bend and lift without straining lumbar spine. Patient will demonstrate good abdominal bracing and body mechanics during therapy session in order to preserve neutral spine during household tasks. Patient will be IND with HEP in order to meet long term goals. Long Term Goals:  Time Frame: 12 weeks    Patient will improve score of Tinetti balance and gait assessment from baseline 21/28 to at least 24/28 in order to decrease risk of falling with functional mobility. Patient will squat to lift 10 lb weight from floor to standing with good body mechanics in order to perform household tasks and place items in grocery cart. Patient will improve LLE strength to 4+/5 in order to squat, lunge, and navigate steps without difficulty. Patient Education:   []  HEP/Education Completed: Plan of Care, Goals, Posterior tilt standing against wall, hooklying bridge, hip flexor off edge, piriformis stretch, longitudinal hip ER/IR, sidelying clamshell   Medbridge Access Code: [email or web address]   []  No new Education completed  [x]  Reviewed Prior HEP      [x]  Patient verbalized and/or demonstrated understanding of education provided. []  Patient unable to verbalize and/or demonstrate understanding of education provided. Will continue education.   []  Barriers to learning: none    PLAN:

## 2020-12-22 NOTE — PROGRESS NOTES
[address] [facility]  PHYSICAL THERAPY  [] EVALUATION  [x] DAILY NOTE (LAND) [] DAILY NOTE (AQUATIC ) [] PROGRESS NOTE [] DISCHARGE NOTE    [x] OUTPATIENT REHABILITATION CENTER [facility]    [] [place] [address]    [] [facility]   [] Maxwell Cuevas    Date: 2020  Patient Name:  Lyly Vo  : 1930  MRN: [id number]  CSN: [id number]    Referring Practitioner Halle Millan MD   Diagnosis Unspecified kyphosis, site unspecified [M40.209]    Treatment Diagnosis Lumbago, spinal stiffness, B hip stiffness, LLE weakness, thoracic kyphosis abnormal posture   Date of Evaluation 20    Additional Pertinent History Hx of lumbar pain,  lumbar L4/L5 fracture, non surgical healing. 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 2018 epidural with relief for about a year, repeated in  and 2020. Patient reports left lateral thigh and anterior thigh has pins/needles. EMG Moderate L5 radiculopathy causing foot drop. Dr. 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 Functional Outcome Measure Used Tinetti Balance and Gait assessment    Functional Outcome Score 21/28 Moderate fall risk (20)       Insurance: Primary: Payor: Rich Cooks /  /  / ,   Secondary: HU[employer]   Authorization Information: 20% copay, secondary humana. Aquatic and modalities covered. Plans to change his secondary coverage 2020. Visit # 7, 7/10 for progress note    Visits Allowed: Unlimited pain   Recertification Date: 8252   Physician Follow-Up: unknown   Physician Orders: PT order 2020   History of Present Illness: Since having epidural in 2020 he had relief of lumbar pain. He lifted 60 lb bag of play sand around September and his pain has been elevated since, developed forward bend posture of kyphosis. November 3 SI epidural again.  Family doctor referred to PT for kyphosis. SUBJECTIVE: Patient feels since starting therapy his back pain is less but he still becomes forward bent with activity. TREATMENT   Precautions:    Pain: Lumbar pain, LLE weakness, thoracic kyphosis posture    X in shaded column indicates activity completed today   Modalities Parameters/  Location  Notes                     Manual Therapy Time/Technique  Notes                     Exercise/Intervention   Notes   NuStep warm up Seat 11 arms 10 5 min Level 5 x                  Seated trunk extension against wall 5x 10 sec  Wall to occiput 7 cm   Standing cane AAROM shoulder extension  3x 10 sec  Tactile cues on chest and back for posture          Supine trunk elongation stretch - overhead reach for thoracic extension 60 sec  x    Hooklying piriformis stretch bilaterally  2x 20 sec x    Longitudinal ER/IR bilateral  10x ea  x    Supine bridge     HEP   Supine hip flexor stretch off edge of plinth  2x 20 sec x    Sidelying clamshell  15x      SLR  20x  x           Standing balance activities     HEP   Tandem stance        Blue foam normal stance - head turns lateral/vertical              Hydrostick - forward, lateral, [place] 20x   x    Nautilus seated row 2 set 10x 20 lb x             Specific Interventions Next Treatment: NuStep warm up, Posterior tilt standing against wall, thoracic extension seated with ball behind back, hooklying bridge, hip flexor off edge, piriformis stretch, longitudinal hip ER/IR, sidelying clamshell, IT band stretching     Activity/Treatment Tolerance:  [x]  Patient tolerated treatment well  []  Patient limited by fatigue  []  Patient limited by pain   []  Patient limited by medical complications  []  Other:     Assessment: Progressed strengthening tasks on Nautilus and hydrostick for scapular stabilization, and leg raises for abdominal bracing with good tolerance and no increased pain.      GOALS:  Patient Goal: decrease back pain, walk more upright    Short Term Goals: Time Frame: 6 weeks    Patient will report decreased Lumbar pain from baseline 2/10 to less than 1/10 during therapy exercises in order to stand and walk with upright posture. Patient will improve BLE AROM to 0-90 deg hamstring, 0-120 deg piriformis stretch, 0-15 deg hip extension in order to bend and lift without straining lumbar spine. Patient will demonstrate good abdominal bracing and body mechanics during therapy session in order to preserve neutral spine during household tasks. Patient will be IND with HEP in order to meet long term goals. Long Term Goals:  Time Frame: 12 weeks    Patient will improve score of Tinetti balance and gait assessment from baseline 21/28 to at least 24/28 in order to decrease risk of falling with functional mobility. Patient will squat to lift 10 lb weight from floor to standing with good body mechanics in order to perform household tasks and place items in grocery cart. Patient will improve LLE strength to 4+/5 in order to squat, lunge, and navigate steps without difficulty. Patient Education:   []  HEP/Education Completed: Plan of Care, Goals, Posterior tilt standing against wall, hooklying bridge, hip flexor off edge, piriformis stretch, longitudinal hip ER/IR, sidelying clamshell   Medbridge Access Code: [email or web address]   []  No new Education completed  [x]  Reviewed Prior HEP      [x]  Patient verbalized and/or demonstrated understanding of education provided. []  Patient unable to verbalize and/or demonstrate understanding of education provided. Will continue education.   []  Barriers to learning: none    PLAN:  Treatment Recommendations: Strengthening, Range of Motion, Balance Training, Functional Mobility Training, Transfer Training, Endurance Training, Gait Training, Stair Training, Manual Therapy - Soft Tissue Mobilization, Manual Therapy - Joint Manipulation, Pain Management, Home Exercise Program, Patient Education, Safety Education and Training, Integrative Dry Needling, Aquatics and Modalities    []  Plan of care initiated. Plan to see patient 2 times per week for 12 weeks to address the treatment planned outlined above.   [x]  Continue with current plan of care  []  Modify plan of care as follows:    []  Hold pending physician visit  []  Discharge    Time In 1500   Time Out 1545   Timed Code Minutes: 45   Total Treatment Time: 45       Electronically Signed by: Carl Delacruz

## 2020-12-23 NOTE — PROGRESS NOTES
[facility]  PHYSICAL THERAPY  [] EVALUATION  [x] DAILY NOTE (LAND) [] DAILY NOTE (AQUATIC ) [] PROGRESS NOTE [] DISCHARGE NOTE    [x] OUTPATIENT REHABIL[facility]ATION [facility] - [place]    [] [place]     [] [facility]   [] Nash Navas    Date: 2020  Patient Name:  Manasa Calvillo  : 1930  MRN: [id number]  CSN: [id number]    Referring Practitioner Villa Rodríguez MD   Diagnosis Unspecified kyphosis, site unspecified [M40.209]    Treatment Diagnosis Lumbago, spinal stiffness, B hip stiffness, LLE weakness, thoracic kyphosis abnormal posture   Date of Evaluation 20    Additional Pertinent History Hx of lumbar pain,  lumbar L4/L5 fracture, non surgical healing. 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 2018 epidural with relief for about a year, repeated in  and 2020. Patient reports left lateral thigh and anterior thigh has pins/needles. EMG Moderate L5 radiculopathy causing foot drop. Dr. 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 Functional Outcome Measure Used Tinetti Balance and Gait assessment    Functional Outcome Score 21/28 Moderate fall risk (20)       Insurance: Primary: Payor: Saba Kaplan /  /  / ,   Secondary: HU[employer]   Authorization Information: 20% copay, secondary humana. Aquatic and modalities covered. Plans to change his secondary coverage 2020.     Visit # 8, 8/10 for progress note    Visits Allowed: Unlimited pain   Recertification Date: 1882   Physician Follow-Up: unknown   Physician Orders: PT order 2020 History of Present Illness: Since having epidural in January 2020 he had relief of lumbar pain. He lifted 60 lb bag of play sand around September and his pain has been elevated since, developed forward bend posture of kyphosis. November 3 SI epidural again. Family doctor referred to PT for kyphosis. SUBJECTIVE: Denies back pain, still experiences kyphosis and forward flexed posture with standing activity. He was slightly fatigued following yesterday's session.      TREATMENT   Precautions:    Pain: Lumbar pain, LLE weakness, thoracic kyphosis posture    X in shaded column indicates activity completed today   Modalities Parameters/  Location  Notes                     Manual Therapy Time/Technique  Notes                     Exercise/Intervention   Notes   NuStep warm up Seat 11 arms 10  6 min Level 4 x                  Seated trunk extension against wall 5x 10 sec x Wall to occiput 7 cm, cues for cervical retraction   Standing cane AAROM shoulder extension  3x 10 sec  Tactile cues on chest and back for posture          Supine trunk elongation stretch - overhead reach for thoracic extension 60 sec      Hooklying piriformis stretch bilaterally  2x 20 sec x    Longitudinal ER/IR bilateral  10x ea   HEP   Supine bridge     HEP   Supine hip flexor stretch off edge of plinth  2x 20 sec x    Sidelying clamshell  15x   HEP   SLR  20x  x           Standing balance activities     HEP   Tandem stance        Blue foam normal stance - head turns lateral/vertical              Hydrostick - forward, lateral, [place] 20x   x    Nautilus seated row 2 set 10x 20 lb x    Hydrohip bilateral and single leg 10x ea Level 5 x Red wedge and pillow   Nautilus Lat pull down 15x 30 lb x           Scapular theraband Specific Interventions Next Treatment: NuStep warm up, Posterior tilt standing against wall, thoracic extension seated with ball behind back, hooklying bridge, hip flexor off edge, piriformis stretch, longitudinal hip ER/IR, sidelying clamshell, IT band stretching     Activity/Treatment Tolerance:  [x]  Patient tolerated treatment well  []  Patient limited by fatigue  []  Patient limited by pain   []  Patient limited by medical complications  []  Other:     Assessment: Continued with progressions on resistance equipment today, and tactile and verbal cues given for upright posture during wall stretch for scapular retraction. GOALS:  Patient Goal: decrease back pain, walk more upright    Short Term Goals:  Time Frame: 6 weeks    Patient will report decreased Lumbar pain from baseline 2/10 to less than 1/10 during therapy exercises in order to stand and walk with upright posture. Patient will improve BLE AROM to 0-90 deg hamstring, 0-120 deg piriformis stretch, 0-15 deg hip extension in order to bend and lift without straining lumbar spine. Patient will demonstrate good abdominal bracing and body mechanics during therapy session in order to preserve neutral spine during household tasks. Patient will be IND with HEP in order to meet long term goals. Long Term Goals:  Time Frame: 12 weeks    Patient will improve score of Tinetti balance and gait assessment from baseline 21/28 to at least 24/28 in order to decrease risk of falling with functional mobility. Patient will squat to lift 10 lb weight from floor to standing with good body mechanics in order to perform household tasks and place items in grocery cart. Patient will improve LLE strength to 4+/5 in order to squat, lunge, and navigate steps without difficulty.        Patient Education: []  HEP/Education Completed: Plan of Care, Goals, Posterior tilt standing against wall, hooklying bridge, hip flexor off edge, piriformis stretch, longitudinal hip ER/IR, sidelying clamshell   Medbridge Access Code: [email or web address]   []  No new Education completed  [x]  Reviewed Prior HEP      [x]  Patient verbalized and/or demonstrated understanding of education provided. []  Patient unable to verbalize and/or demonstrate understanding of education provided. Will continue education. []  Barriers to learning: none    PLAN:  Treatment Recommendations: Strengthening, Range of Motion, Balance Training, Functional Mobility Training, Transfer Training, Endurance Training, Gait Training, Stair Training, Manual Therapy - Soft Tissue Mobilization, Manual Therapy - Joint Manipulation, Pain Management, Home Exercise Program, Patient Education, Safety Education and Training, Integrative Dry Needling, Aquatics and Modalities    []  Plan of care initiated. Plan to see patient 2 times per week for 12 weeks to address the treatment planned outlined above.   [x]  Continue with current plan of care  []  Modify plan of care as follows:    []  Hold pending physician visit  []  Discharge    Time In 1500   Time Out 1545   Timed Code Minutes: 45   Total Treatment Time: 45       Electronically Signed by: Christian Puentes

## 2020-12-29 NOTE — PROGRESS NOTES
[address] [facility]  PHYSICAL THERAPY  [] EVALUATION  [x] DAILY NOTE (LAND) [] DAILY NOTE (AQUATIC ) [] PROGRESS NOTE [] DISCHARGE NOTE    [x] OUTPATIENT REHABILITATION CENTER [facility]    [] [place] [address]    [] [facility]   [] Aamdeo Favors    Date: 2020  Patient Name:  Montrell Anguiano  : 1930  MRN: [id number]  CSN: [id number]    Referring Practitioner Richard Goss MD   Diagnosis Unspecified kyphosis, site unspecified [M40.209]    Treatment Diagnosis Lumbago, spinal stiffness, B hip stiffness, LLE weakness, thoracic kyphosis abnormal posture   Date of Evaluation 20    Additional Pertinent History Hx of lumbar pain,  lumbar L4/L5 fracture, non surgical healing. 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 2018 epidural with relief for about a year, repeated in  and 2020. Patient reports left lateral thigh and anterior thigh has pins/needles. EMG Moderate L5 radiculopathy causing foot drop. Dr. 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 Functional Outcome Measure Used Tinetti Balance and Gait assessment    Functional Outcome Score 21/28 Moderate fall risk (20)       Insurance: Primary: Payor: Trinidad Simmons /  /  / ,   Secondary: HUMANA   Authorization Information: 20% copay, secondary humana. Aquatic and modalities covered. Plans to change his secondary coverage 2020.     Visit # 9, 9/10 for progress note    Visits Allowed: Unlimited pain   Recertification Date: 4411   Physician Follow-Up: unknown   Physician Orders: PT order 2020 History of Present Illness: Since having epidural in January 2020 he had relief of lumbar pain. He lifted 60 lb bag of play sand around September and his pain has been elevated since, developed forward bend posture of kyphosis. November 3 SI epidural again. Family doctor referred to PT for kyphosis. SUBJECTIVE: Patient denies lumbar pain, and he has been performing HEP including standing balance tasks holding onto a chair for L ankle strengthening.      TREATMENT   Precautions:    Pain: Lumbar pain, LLE weakness, thoracic kyphosis posture    X in shaded column indicates activity completed today   Modalities Parameters/  Location  Notes                     Manual Therapy Time/Technique  Notes                     Exercise/Intervention   Notes   NuStep warm up Seat 11 arms 10  6 min Level 3 x                  Standing trunk extension against wall 5x 10 sec  Wall to occiput 7 cm, cues for cervical retraction   Standing cane AAROM shoulder extension  3x 10 sec  Tactile cues on chest and back for posture          Supine trunk elongation stretch - overhead reach for thoracic extension 60 sec  x No pillow, tactile cues for scapular retraction   Hooklying piriformis stretch bilaterally  2x 20 sec     Longitudinal ER/IR bilateral  10x ea   HEP   Supine bridge     HEP   Supine hip flexor stretch off edge of plinth  2x 20 sec  HEP   Sidelying clamshell  15x   HEP   SLR  20x  x           Standing balance activities     HEP   Tandem stance        Blue foam normal stance - head turns lateral/vertical              Hydrostick - forward, lateral, [place] 20x   x No circles today for time   Nautilus seated row 20x 20 lb x    Hydrohip bilateral and single leg 10x ea Level 5  Red wedge and pillow   Nautilus Lat pull down 20x 30 lb x           Scapular theraband []  HEP/Education Completed: Plan of Care, Goals, Posterior tilt standing against wall, hooklying bridge, hip flexor off edge, piriformis stretch, longitudinal hip ER/IR, sidelying clamshell   Medbridge Access Code: [email or web address]   []  No new Education completed  [x]  Reviewed Prior HEP      [x]  Patient verbalized and/or demonstrated understanding of education provided. []  Patient unable to verbalize and/or demonstrate understanding of education provided. Will continue education. []  Barriers to learning: none    PLAN:  Treatment Recommendations: Strengthening, Range of Motion, Balance Training, Functional Mobility Training, Transfer Training, Endurance Training, Gait Training, Stair Training, Manual Therapy - Soft Tissue Mobilization, Manual Therapy - Joint Manipulation, Pain Management, Home Exercise Program, Patient Education, Safety Education and Training, Integrative Dry Needling, Aquatics and Modalities    []  Plan of care initiated. Plan to see patient 2 times per week for 12 weeks to address the treatment planned outlined above.   [x]  Continue with current plan of care  []  Modify plan of care as follows:    []  Hold pending physician visit  []  Discharge    Time In 1520   Time Out 1545   Timed Code Minutes: 25   Total Treatment Time: 25       Electronically Signed by: Ran Vila

## 2020-12-30 NOTE — PROGRESS NOTES
[address] [facility]  PHYSICAL THERAPY  [] EVALUATION  [] DAILY NOTE (LAND) [] DAILY NOTE (AQUATIC ) [x] PROGRESS NOTE [] DISCHARGE NOTE    [x] OUTPATIENT REHABILITATION CENTER [facility]    [] [place] [address]    [] [facility]   [] Kristian Rivera    Date: 2020  Patient Name:  Renato Alvarez  : 1930  MRN: [id number]  CSN: [id number]    Referring Practitioner Nj Bourgeois MD   Diagnosis Unspecified kyphosis, site unspecified [M40.209]    Treatment Diagnosis Lumbago, spinal stiffness, B hip stiffness, LLE weakness, thoracic kyphosis abnormal posture   Date of Evaluation 20    Additional Pertinent History Hx of lumbar pain,  lumbar L4/L5 fracture, non surgical healing. 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 2018 epidural with relief for about a year, repeated in  and 2020. Patient reports left lateral thigh and anterior thigh has pins/needles. EMG Moderate L5 radiculopathy causing foot drop. Dr. 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 Functional Outcome Measure Used Tinetti Balance and Gait assessment    Functional Outcome Score 21/28 Moderate fall risk (20)   25/28 Low fall risk (20)      Insurance: Primary: Payor: Georges Crowe /  /  / ,   Secondary: HU[employer]   Authorization Information: 20% copay, secondary humana. Aquatic and modalities covered. Plans to change his secondary coverage 2020.     Visit # 10, 0/10 for progress note    Visits Allowed: Unlimited pain   Recertification Date:    Physician Follow-Up: unknown   Physician Orders: PT order 2020 Reassessment - hip ROM and strength   x             TINETTI BALANCE TEST    BALANCE Patient is seated in a hard, armless chair   1 SITTING BALANCE 1 - Steady, safe   2. RISES FROM CHAIR 2 - Able without use of arms   3. ATTEMPTS TO RISE 2 - Able to rise, 1 attempt   4. IMMEDIATE STANDING BALANCE (FIRST 5 SECONDS) 2 - Steady without walker or other support   5. STANDING BALANCE 2 - Narrow stance without support   6. NUDGED 2 - Steady   7. EYES CLOSED 1 - Steady   8. TURNING 360 DEGREES 1 - Continuous and 0 - Unsteady (grabs,staggers)   9. SITTING DOWN 2 - Safe,smooth motion   BALANCE SCORE 15/16       GAIT SECTION Patient stands with therapist, walks across room (+/- aids), fist at usual pace, then at rapid pace. 1.  INDICATION OF GAIT (Immediately after told to \"go\" 1 - No hesitancy   2. STEP LENGTH AND HEIGHT 1 - Step through Right and 1 - Step through Left   3. FOOT CLEARANCE 1 - Left foot clears floor and 1 - Right foot clears floor   4. STEP SYMMETRY 1 - Right and left step length appear equal   5. STEP CONTINUITY 1 - Steps appear continuous   6. PATH 1 - Mild/moderate deviation or uses walking aid   7. TRUNK 1 - No sway but flexes knees or back or uses arms for stability   8.   WALKING TIME 1 - Heels almost touching while walking   GAIT SCORE 10/12   TOTAL SCORE 25/28   Risk Indicators:  Less than/equal to 18 = high risk  19-23 Moderate risk  Greater than/equal to 24 = low risk Low         Specific Interventions Next Treatment: NuStep warm up, Posterior tilt standing against wall, thoracic extension seated with ball behind back, hooklying bridge, hip flexor off edge, piriformis stretch, longitudinal hip ER/IR, sidelying clamshell, IT band stretching     Activity/Treatment Tolerance:  [x]  Patient tolerated treatment well  []  Patient limited by fatigue  []  Patient limited by pain   []  Patient limited by medical complications  []  Other: Assessment: Patient has been making progress toward therapy goals. He demonstrates improved thoracic extension flexibility and scapular retraction, however still needing frequent cues for posture during resistance training exercises. We have been addressing scapular stabilization exercises and abdominal bracing as well as hip flexibility and thoracic extension supine without a pillow. He would benefit from continued PT to improve standing balance, leg strengthening, and improved postural alignment of the thoracic spine. GOALS:  Patient Goal: decrease back pain, walk more upright    Short Term Goals:  Time Frame: 6 weeks    Patient will report decreased Lumbar pain from baseline 2/10 to less than 1/10 during therapy exercises in order to stand and walk with upright posture. [x] Goal Met [] Goal Not Met [] Continue Goal [x] Discontinue Goal  [] Revise Goal  Goal Assessment:  Denies pain during session even with thoracic extension tasks. Patient will improve BLE AROM to 0-90 deg hamstring, 0-120 deg piriformis stretch, 0-15 deg hip extension in order to bend and lift without straining lumbar spine. [] Goal Met [x] Goal Not Met [x] Continue Goal [] Discontinue Goal  [] Revise Goal  Goal Assessment: progress made, R hip extension limited to neutral, R hamstring 0-70 deg, piriformis 0-100 deg tightness noted. Left hip extension 0-5 deg, L hamstring 0-75 deg, L piriformis 0-105 deg. Patient will demonstrate good abdominal bracing and body mechanics during therapy session in order to preserve neutral spine during household tasks. [] Goal Met [x] Goal Not Met [x] Continue Goal [] Discontinue Goal  [] Revise Goal  Goal Assessment: Progress made, needs cues for reminder of posture during resistance equipment. Patient will be IND with HEP in order to meet long term goals.    [x] Goal Met [] Goal Not Met [] Continue Goal [x] Discontinue Goal  [] Revise Goal  Goal Assessment: performing consistently at home Long Term Goals:  Time Frame: 12 weeks    Patient will improve score of Tinetti balance and gait assessment from baseline 21/28 to at least 24/28 in order to decrease risk of falling with functional mobility. [x] Goal Met [] Goal Not Met [] Continue Goal [x] Discontinue Goal  [] Revise Goal  Goal Assessment:  More even step length and upright posture noted, good static balance but occasionally unsteady with direction changes or turning. L ankle weakness continues    Patient will squat to lift 10 lb weight from floor to standing with good body mechanics in order to perform household tasks and place items in grocery cart. [] Goal Met [x] Goal Not Met [x] Continue Goal [] Discontinue Goal  [] Revise Goal  Goal Assessment:  Not yet attempted due to balance limitations and hip tightness. Continue. Patient will improve LLE strength to 4+/5 in order to squat, lunge, and navigate steps without difficulty. [] Goal Met [x] Goal Not Met [x] Continue Goal [] Discontinue Goal  [] Revise Goal  Goal Assessment:  Progress made. 4/5 hip flexor, 4/5 hip abd, 4+/5 hip adduction, 4-/5 bridge    NEW GOAL: Patient will decrease pubb-[id number] measurement from baseline 7cm to less than 4 cm in order to maintain upright thoracic trunk alignment. Patient Education:   []  HEP/Education Completed: Plan of Care, Goals, Posterior tilt standing against wall, hooklying bridge, hip flexor off edge, piriformis stretch, longitudinal hip ER/IR, sidelying clamshell   Medbridge Access Code: [email or web address]   []  No new Education completed  [x]  Reviewed Prior HEP      [x]  Patient verbalized and/or demonstrated understanding of education provided. []  Patient unable to verbalize and/or demonstrate understanding of education provided. Will continue education.   []  Barriers to learning: none    PLAN: Treatment Recommendations: Strengthening, Range of Motion, Balance Training, Functional Mobility Training, Transfer Training, Endurance Training, Gait Training, Stair Training, Manual Therapy - Soft Tissue Mobilization, Manual Therapy - Joint Manipulation, Pain Management, Home Exercise Program, Patient Education, Safety Education and Training, Integrative Dry Needling, Aquatics and Modalities    []  Plan of care initiated. Plan to see patient 2 times per week for 12 weeks to address the treatment planned outlined above.   [x]  Continue with current plan of care  []  Modify plan of care as follows:    []  Hold pending physician visit  []  Discharge    Time In 1500   Time Out 1545   Timed Code Minutes: 45   Total Treatment Time: 45       Electronically Signed by: Basilia Anna

## 2021-01-01 ENCOUNTER — HOSPITAL ENCOUNTER (OUTPATIENT)
Dept: ULTRASOUND IMAGING | Age: 86
Discharge: HOME | DRG: 215 | End: 2021-05-03
Payer: MEDICARE

## 2021-01-01 ENCOUNTER — HOSPITAL ENCOUNTER (OUTPATIENT)
Dept: PHYSICAL THERAPY | Age: 86
Setting detail: THERAPIES SERIES
Discharge: HOME | End: 2021-02-24
Payer: MEDICARE

## 2021-01-01 ENCOUNTER — APPOINTMENT (OUTPATIENT)
Dept: GENERAL RADIOLOGY | Age: 86
DRG: 215 | End: 2021-01-01
Payer: MEDICARE

## 2021-01-01 ENCOUNTER — HOSPITAL ENCOUNTER (OUTPATIENT)
Dept: NON INVASIVE DIAGNOSTICS | Age: 86
Discharge: HOME | End: 2021-04-08
Payer: MEDICARE

## 2021-01-01 ENCOUNTER — HOSPITAL ENCOUNTER (OUTPATIENT)
Dept: GENERAL RADIOLOGY | Age: 86
Discharge: HOME | DRG: 215 | End: 2021-05-03
Payer: MEDICARE

## 2021-01-01 ENCOUNTER — TELEPHONE (OUTPATIENT)
Dept: CARDIOLOGY CLINIC | Age: 86
End: 2021-01-01

## 2021-01-01 ENCOUNTER — OFFICE VISIT (OUTPATIENT)
Dept: CARDIOLOGY CLINIC | Age: 86
End: 2021-01-01
Payer: MEDICARE

## 2021-01-01 ENCOUNTER — HOSPITAL ENCOUNTER (OUTPATIENT)
Age: 86
Discharge: HOME | End: 2021-04-21
Payer: MEDICARE

## 2021-01-01 ENCOUNTER — HOSPITAL ENCOUNTER (OUTPATIENT)
Dept: PHYSICAL THERAPY | Age: 86
Setting detail: THERAPIES SERIES
Discharge: HOME | End: 2021-01-26
Payer: MEDICARE

## 2021-01-01 ENCOUNTER — APPOINTMENT (OUTPATIENT)
Dept: PHYSICAL THERAPY | Age: 86
End: 2021-01-01
Payer: MEDICARE

## 2021-01-01 ENCOUNTER — HOSPITAL ENCOUNTER (OUTPATIENT)
Dept: PHYSICAL THERAPY | Age: 86
Setting detail: THERAPIES SERIES
Discharge: HOME | End: 2021-01-08
Payer: MEDICARE

## 2021-01-01 ENCOUNTER — PREP FOR PROCEDURE (OUTPATIENT)
Dept: CARDIOLOGY | Age: 86
End: 2021-01-01

## 2021-01-01 ENCOUNTER — HOSPITAL ENCOUNTER (OUTPATIENT)
Dept: PHYSICAL THERAPY | Age: 86
Setting detail: THERAPIES SERIES
Discharge: HOME | End: 2021-01-29
Payer: MEDICARE

## 2021-01-01 ENCOUNTER — HOSPITAL ENCOUNTER (OUTPATIENT)
Dept: PHYSICAL THERAPY | Age: 86
Setting detail: THERAPIES SERIES
Discharge: HOME | End: 2021-01-15
Payer: MEDICARE

## 2021-01-01 ENCOUNTER — HOSPITAL ENCOUNTER (OUTPATIENT)
Dept: PHYSICAL THERAPY | Age: 86
Setting detail: THERAPIES SERIES
Discharge: HOME | End: 2021-01-12
Payer: MEDICARE

## 2021-01-01 ENCOUNTER — HOSPITAL ENCOUNTER (OUTPATIENT)
Age: 86
Discharge: HOME | DRG: 215 | End: 2021-05-03
Payer: MEDICARE

## 2021-01-01 ENCOUNTER — OFFICE VISIT (OUTPATIENT)
Dept: CARDIOTHORACIC SURGERY | Age: 86
End: 2021-01-01
Payer: MEDICARE

## 2021-01-01 ENCOUNTER — HOSPITAL ENCOUNTER (OUTPATIENT)
Dept: PHYSICAL THERAPY | Age: 86
Setting detail: THERAPIES SERIES
Discharge: HOME | End: 2021-02-17
Payer: MEDICARE

## 2021-01-01 ENCOUNTER — HOSPITAL ENCOUNTER (INPATIENT)
Age: 86
LOS: 4 days | DRG: 215 | End: 2021-05-10
Attending: EMERGENCY MEDICINE | Admitting: FAMILY MEDICINE
Payer: MEDICARE

## 2021-01-01 ENCOUNTER — HOSPITAL ENCOUNTER (OUTPATIENT)
Dept: PHYSICAL THERAPY | Age: 86
Setting detail: THERAPIES SERIES
Discharge: HOME | End: 2021-01-06
Payer: MEDICARE

## 2021-01-01 ENCOUNTER — HOSPITAL ENCOUNTER (OUTPATIENT)
Dept: PHYSICAL THERAPY | Age: 86
Setting detail: THERAPIES SERIES
Discharge: HOME | End: 2021-02-12
Payer: MEDICARE

## 2021-01-01 ENCOUNTER — HOSPITAL ENCOUNTER (OUTPATIENT)
Dept: PHYSICAL THERAPY | Age: 86
Setting detail: THERAPIES SERIES
Discharge: HOME | End: 2021-01-27
Payer: MEDICARE

## 2021-01-01 ENCOUNTER — PROCEDURE VISIT (OUTPATIENT)
Dept: NEUROLOGY | Age: 86
End: 2021-01-01
Payer: MEDICARE

## 2021-01-01 ENCOUNTER — HOSPITAL ENCOUNTER (OUTPATIENT)
Dept: INTERVENTIONAL RADIOLOGY/VASCULAR | Age: 86
Discharge: HOME | End: 2021-04-29
Payer: MEDICARE

## 2021-01-01 ENCOUNTER — HOSPITAL ENCOUNTER (OUTPATIENT)
Dept: PHYSICAL THERAPY | Age: 86
Setting detail: THERAPIES SERIES
Discharge: HOME | End: 2021-02-26
Payer: MEDICARE

## 2021-01-01 ENCOUNTER — HOSPITAL ENCOUNTER (OUTPATIENT)
Dept: PHYSICAL THERAPY | Age: 86
Setting detail: THERAPIES SERIES
Discharge: HOME | End: 2021-02-19
Payer: MEDICARE

## 2021-01-01 ENCOUNTER — HOSPITAL ENCOUNTER (OUTPATIENT)
Dept: PHYSICAL THERAPY | Age: 86
Setting detail: THERAPIES SERIES
Discharge: HOME | End: 2021-02-03
Payer: MEDICARE

## 2021-01-01 ENCOUNTER — APPOINTMENT (OUTPATIENT)
Dept: CARDIAC CATH/INVASIVE PROCEDURES | Age: 86
DRG: 215 | End: 2021-01-01
Payer: MEDICARE

## 2021-01-01 ENCOUNTER — HOSPITAL ENCOUNTER (OUTPATIENT)
Dept: PHYSICAL THERAPY | Age: 86
Setting detail: THERAPIES SERIES
Discharge: HOME | End: 2021-01-20
Payer: MEDICARE

## 2021-01-01 ENCOUNTER — HOSPITAL ENCOUNTER (OUTPATIENT)
Dept: CT IMAGING | Age: 86
Discharge: HOME | End: 2021-04-29
Payer: MEDICARE

## 2021-01-01 VITALS
HEIGHT: 69 IN | HEART RATE: 83 BPM | BODY MASS INDEX: 21.77 KG/M2 | DIASTOLIC BLOOD PRESSURE: 60 MMHG | SYSTOLIC BLOOD PRESSURE: 90 MMHG | WEIGHT: 147 LBS | OXYGEN SATURATION: 98 %

## 2021-01-01 VITALS
DIASTOLIC BLOOD PRESSURE: 58 MMHG | RESPIRATION RATE: 8 BRPM | OXYGEN SATURATION: 81 % | SYSTOLIC BLOOD PRESSURE: 89 MMHG | WEIGHT: 148.1 LBS | TEMPERATURE: 99.7 F | HEIGHT: 69 IN | BODY MASS INDEX: 21.94 KG/M2 | HEART RATE: 179 BPM

## 2021-01-01 VITALS
DIASTOLIC BLOOD PRESSURE: 75 MMHG | BODY MASS INDEX: 19.99 KG/M2 | SYSTOLIC BLOOD PRESSURE: 121 MMHG | WEIGHT: 135 LBS | HEIGHT: 69 IN

## 2021-01-01 VITALS
HEIGHT: 69 IN | WEIGHT: 138 LBS | SYSTOLIC BLOOD PRESSURE: 112 MMHG | BODY MASS INDEX: 20.44 KG/M2 | HEART RATE: 78 BPM | DIASTOLIC BLOOD PRESSURE: 75 MMHG

## 2021-01-01 VITALS
SYSTOLIC BLOOD PRESSURE: 100 MMHG | HEIGHT: 69 IN | BODY MASS INDEX: 20.14 KG/M2 | DIASTOLIC BLOOD PRESSURE: 67 MMHG | WEIGHT: 136 LBS | HEART RATE: 75 BPM

## 2021-01-01 VITALS
HEART RATE: 88 BPM | BODY MASS INDEX: 22.89 KG/M2 | WEIGHT: 155 LBS | DIASTOLIC BLOOD PRESSURE: 50 MMHG | SYSTOLIC BLOOD PRESSURE: 100 MMHG | OXYGEN SATURATION: 93 %

## 2021-01-01 VITALS
BODY MASS INDEX: 23.22 KG/M2 | DIASTOLIC BLOOD PRESSURE: 84 MMHG | SYSTOLIC BLOOD PRESSURE: 118 MMHG | HEART RATE: 83 BPM | OXYGEN SATURATION: 93 % | WEIGHT: 156.8 LBS | HEIGHT: 69 IN

## 2021-01-01 DIAGNOSIS — R53.83 FATIGUE, UNSPECIFIED TYPE: ICD-10-CM

## 2021-01-01 DIAGNOSIS — I35.0 AORTIC VALVE STENOSIS, ETIOLOGY OF CARDIAC VALVE DISEASE UNSPECIFIED: ICD-10-CM

## 2021-01-01 DIAGNOSIS — I35.0 AORTIC VALVE STENOSIS, ETIOLOGY OF CARDIAC VALVE DISEASE UNSPECIFIED: Primary | ICD-10-CM

## 2021-01-01 DIAGNOSIS — I50.22 CHF (CONGESTIVE HEART FAILURE), NYHA CLASS III, CHRONIC, SYSTOLIC (HCC): Primary | ICD-10-CM

## 2021-01-01 DIAGNOSIS — R06.00 DYSPNEA, UNSPECIFIED TYPE: ICD-10-CM

## 2021-01-01 DIAGNOSIS — I50.22 CHRONIC SYSTOLIC CONGESTIVE HEART FAILURE (HCC): ICD-10-CM

## 2021-01-01 DIAGNOSIS — I48.91 ATRIAL FIBRILLATION WITH RAPID VENTRICULAR RESPONSE (HCC): ICD-10-CM

## 2021-01-01 DIAGNOSIS — R60.0 BILATERAL LEG EDEMA: ICD-10-CM

## 2021-01-01 DIAGNOSIS — I35.0 NONRHEUMATIC AORTIC VALVE STENOSIS: Primary | ICD-10-CM

## 2021-01-01 DIAGNOSIS — R06.02 SOB (SHORTNESS OF BREATH): ICD-10-CM

## 2021-01-01 DIAGNOSIS — I35.0 SYMPTOMATIC SEVERE AORTIC STENOSIS WITH LOW EJECTION FRACTION: Primary | ICD-10-CM

## 2021-01-01 DIAGNOSIS — R06.09 DOE (DYSPNEA ON EXERTION): ICD-10-CM

## 2021-01-01 DIAGNOSIS — G62.9 NEUROPATHY: ICD-10-CM

## 2021-01-01 DIAGNOSIS — J90 PLEURAL EFFUSION, RIGHT: ICD-10-CM

## 2021-01-01 DIAGNOSIS — R77.8 ELEVATED TROPONIN: ICD-10-CM

## 2021-01-01 DIAGNOSIS — M54.16 LUMBAR RADICULOPATHY: ICD-10-CM

## 2021-01-01 DIAGNOSIS — I35.0 NONRHEUMATIC AORTIC VALVE STENOSIS: ICD-10-CM

## 2021-01-01 DIAGNOSIS — I50.9 ACUTE ON CHRONIC CONGESTIVE HEART FAILURE, UNSPECIFIED HEART FAILURE TYPE (HCC): Primary | ICD-10-CM

## 2021-01-01 DIAGNOSIS — R55 SYNCOPE AND COLLAPSE: ICD-10-CM

## 2021-01-01 DIAGNOSIS — I50.32 CHF (CONGESTIVE HEART FAILURE), NYHA CLASS III, CHRONIC, DIASTOLIC (HCC): ICD-10-CM

## 2021-01-01 DIAGNOSIS — J90 PLEURAL EFFUSION, RIGHT: Primary | ICD-10-CM

## 2021-01-01 DIAGNOSIS — R20.0 LEFT LEG NUMBNESS: Primary | ICD-10-CM

## 2021-01-01 DIAGNOSIS — Z98.890 STATUS POST THORACENTESIS: ICD-10-CM

## 2021-01-01 DIAGNOSIS — I42.0 DILATED CARDIOMYOPATHY (HCC): ICD-10-CM

## 2021-01-01 LAB
ABO: NORMAL
ACINETOBACTER CALCOACETICUS-BAUMANNII BY PCR: NOT DETECTED
ACTIVATED CLOTTING TIME: 252 SECONDS (ref 1–150)
ACTIVATED CLOTTING TIME: 274 SECONDS (ref 1–150)
ADENOVIRUS BY PCR: NOT DETECTED
ALBUMIN SERPL-MCNC: 2.7 G/DL (ref 3.5–5.1)
ALBUMIN SERPL-MCNC: 3.2 G/DL (ref 3.5–5.1)
ALBUMIN SERPL-MCNC: 4.5 G/DL (ref 3.5–5.1)
ALLEN TEST: ABNORMAL
ALP BLD-CCNC: 31 U/L (ref 38–126)
ALP BLD-CCNC: 33 U/L (ref 38–126)
ALP BLD-CCNC: 40 U/L (ref 38–126)
ALT SERPL-CCNC: 350 U/L (ref 11–66)
ALT SERPL-CCNC: 513 U/L (ref 11–66)
ALT SERPL-CCNC: 975 U/L (ref 11–66)
AMMONIA: 21 UMOL/L (ref 11–60)
AMMONIA: 31 UMOL/L (ref 11–60)
AMMONIA: 35 UMOL/L (ref 11–60)
AMORPHOUS: ABNORMAL
ANION GAP SERPL CALCULATED.3IONS-SCNC: 10 MEQ/L (ref 8–16)
ANION GAP SERPL CALCULATED.3IONS-SCNC: 10 MEQ/L (ref 8–16)
ANION GAP SERPL CALCULATED.3IONS-SCNC: 11 MEQ/L (ref 8–16)
ANION GAP SERPL CALCULATED.3IONS-SCNC: 12 MEQ/L (ref 8–16)
ANION GAP SERPL CALCULATED.3IONS-SCNC: 13 MEQ/L (ref 8–16)
ANION GAP SERPL CALCULATED.3IONS-SCNC: 13 MEQ/L (ref 8–16)
ANION GAP SERPL CALCULATED.3IONS-SCNC: 14 MEQ/L (ref 8–16)
ANION GAP SERPL CALCULATED.3IONS-SCNC: 15 MEQ/L (ref 8–16)
ANION GAP SERPL CALCULATED.3IONS-SCNC: 16 MEQ/L (ref 8–16)
ANION GAP SERPL CALCULATED.3IONS-SCNC: 17 MEQ/L (ref 8–16)
ANION GAP SERPL CALCULATED.3IONS-SCNC: 17 MEQ/L (ref 8–16)
ANION GAP SERPL CALCULATED.3IONS-SCNC: 18 MEQ/L (ref 8–16)
ANISOCYTOSIS: PRESENT
ANISOCYTOSIS: PRESENT
ANTIBODY SCREEN: NORMAL
APTT: 130.9 SECONDS (ref 22–38)
APTT: 139.2 SECONDS (ref 22–38)
APTT: 28.6 SECONDS (ref 22–38)
APTT: 71.7 SECONDS (ref 22–38)
APTT: 91.2 SECONDS (ref 22–38)
APTT: > 200 SECONDS (ref 22–38)
AST SERPL-CCNC: 1252 U/L (ref 5–40)
AST SERPL-CCNC: 1512 U/L (ref 5–40)
AST SERPL-CCNC: 377 U/L (ref 5–40)
AVERAGE GLUCOSE: 102 MG/DL (ref 70–126)
BACTERIA: ABNORMAL /HPF
BASE EXCESS (CALCULATED): -1.6 MMOL/L (ref -2.5–2.5)
BASE EXCESS (CALCULATED): -2.1 MMOL/L (ref -2.5–2.5)
BASE EXCESS (CALCULATED): -3.6 MMOL/L (ref -2.5–2.5)
BASE EXCESS MIXED: -0.4 MMOL/L (ref -2–3)
BASE EXCESS MIXED: -0.7 MMOL/L (ref -2–3)
BASE EXCESS MIXED: -0.9 MMOL/L (ref -2–3)
BASE EXCESS MIXED: -1 MMOL/L (ref -2–3)
BASE EXCESS MIXED: -2.8 MMOL/L (ref -2–3)
BASE EXCESS MIXED: -3.3 MMOL/L (ref -2–3)
BASE EXCESS MIXED: -3.3 MMOL/L (ref -2–3)
BASE EXCESS MIXED: -3.6 MMOL/L (ref -2–3)
BASE EXCESS MIXED: -4.1 MMOL/L (ref -2–3)
BASE EXCESS MIXED: -4.7 MMOL/L (ref -2–3)
BASOPHILS # BLD: 0.4 %
BASOPHILS # BLD: 0.4 %
BASOPHILS # BLD: 0.6 %
BASOPHILS # BLD: 0.8 %
BASOPHILS ABSOLUTE: 0.1 THOU/MM3 (ref 0–0.1)
BILIRUB SERPL-MCNC: 4.9 MG/DL (ref 0.3–1.2)
BILIRUB SERPL-MCNC: 7.9 MG/DL (ref 0.3–1.2)
BILIRUB SERPL-MCNC: 8.8 MG/DL (ref 0.3–1.2)
BILIRUBIN DIRECT: 0.7 MG/DL (ref 0–0.3)
BILIRUBIN DIRECT: 1.4 MG/DL (ref 0–0.3)
BILIRUBIN DIRECT: 2.3 MG/DL (ref 0–0.3)
BILIRUBIN URINE: NEGATIVE
BLOOD, URINE: NEGATIVE
BUN BLDV-MCNC: 27 MG/DL (ref 7–22)
BUN BLDV-MCNC: 31 MG/DL (ref 7–22)
BUN BLDV-MCNC: 35 MG/DL (ref 7–22)
BUN BLDV-MCNC: 37 MG/DL (ref 7–22)
BUN BLDV-MCNC: 38 MG/DL (ref 7–22)
BUN BLDV-MCNC: 40 MG/DL (ref 7–22)
BUN BLDV-MCNC: 45 MG/DL (ref 7–22)
BUN BLDV-MCNC: 46 MG/DL (ref 7–22)
BUN BLDV-MCNC: 48 MG/DL (ref 7–22)
BUN BLDV-MCNC: 53 MG/DL (ref 7–22)
BUN BLDV-MCNC: 60 MG/DL (ref 7–22)
BUN BLDV-MCNC: 62 MG/DL (ref 7–22)
BUN BLDV-MCNC: 65 MG/DL (ref 7–22)
BUN BLDV-MCNC: 69 MG/DL (ref 7–22)
BUN BLDV-MCNC: 72 MG/DL (ref 7–22)
BUN BLDV-MCNC: 76 MG/DL (ref 7–22)
CALCIUM IONIZED: 0.95 MMOL/L (ref 1.12–1.32)
CALCIUM IONIZED: 0.96 MMOL/L (ref 1.12–1.32)
CALCIUM IONIZED: 0.96 MMOL/L (ref 1.12–1.32)
CALCIUM IONIZED: 1.02 MMOL/L (ref 1.12–1.32)
CALCIUM IONIZED: 1.08 MMOL/L (ref 1.12–1.32)
CALCIUM IONIZED: 1.16 MMOL/L (ref 1.12–1.32)
CALCIUM IONIZED: 1.16 MMOL/L (ref 1.12–1.32)
CALCIUM SERPL-MCNC: 7.6 MG/DL (ref 8.5–10.5)
CALCIUM SERPL-MCNC: 7.6 MG/DL (ref 8.5–10.5)
CALCIUM SERPL-MCNC: 7.8 MG/DL (ref 8.5–10.5)
CALCIUM SERPL-MCNC: 8 MG/DL (ref 8.5–10.5)
CALCIUM SERPL-MCNC: 8 MG/DL (ref 8.5–10.5)
CALCIUM SERPL-MCNC: 8.5 MG/DL (ref 8.5–10.5)
CALCIUM SERPL-MCNC: 8.6 MG/DL (ref 8.5–10.5)
CALCIUM SERPL-MCNC: 8.9 MG/DL (ref 8.5–10.5)
CALCIUM SERPL-MCNC: 9 MG/DL (ref 8.5–10.5)
CALCIUM SERPL-MCNC: 9 MG/DL (ref 8.5–10.5)
CALCIUM SERPL-MCNC: 9.2 MG/DL (ref 8.5–10.5)
CALCIUM SERPL-MCNC: 9.2 MG/DL (ref 8.5–10.5)
CALCIUM SERPL-MCNC: 9.3 MG/DL (ref 8.5–10.5)
CALCIUM SERPL-MCNC: 9.3 MG/DL (ref 8.5–10.5)
CALCIUM SERPL-MCNC: 9.4 MG/DL (ref 8.5–10.5)
CALCIUM SERPL-MCNC: 9.4 MG/DL (ref 8.5–10.5)
CASTS 2: ABNORMAL /LPF
CASTS UA: ABNORMAL /LPF
CHARACTER, URINE: CLEAR
CHLAMYDIA PNEUMONIAE BY PCR: NOT DETECTED
CHLORIDE BLD-SCNC: 101 MEQ/L (ref 98–111)
CHLORIDE BLD-SCNC: 103 MEQ/L (ref 98–111)
CHLORIDE BLD-SCNC: 104 MEQ/L (ref 98–111)
CHLORIDE BLD-SCNC: 109 MEQ/L (ref 98–111)
CHLORIDE BLD-SCNC: 96 MEQ/L (ref 98–111)
CHLORIDE BLD-SCNC: 97 MEQ/L (ref 98–111)
CHLORIDE BLD-SCNC: 98 MEQ/L (ref 98–111)
CHLORIDE BLD-SCNC: 99 MEQ/L (ref 98–111)
CO2: 19 MEQ/L (ref 23–33)
CO2: 20 MEQ/L (ref 23–33)
CO2: 21 MEQ/L (ref 23–33)
CO2: 22 MEQ/L (ref 23–33)
CO2: 22 MEQ/L (ref 23–33)
CO2: 23 MEQ/L (ref 23–33)
CO2: 25 MEQ/L (ref 23–33)
CO2: 25 MEQ/L (ref 23–33)
CO2: 27 MEQ/L (ref 23–33)
CO2: 28 MEQ/L (ref 23–33)
COLLECTED BY:: ABNORMAL
COLLECTED BY:: NORMAL
COLLECTED BY:: NORMAL
COLOR: ABNORMAL
CORTISOL COLLECTION INFO: NORMAL
CORTISOL: 19.64 UG/DL
CREAT SERPL-MCNC: 0.9 MG/DL (ref 0.4–1.2)
CREAT SERPL-MCNC: 0.9 MG/DL (ref 0.4–1.2)
CREAT SERPL-MCNC: 1.2 MG/DL (ref 0.4–1.2)
CREAT SERPL-MCNC: 1.3 MG/DL (ref 0.4–1.2)
CREAT SERPL-MCNC: 1.3 MG/DL (ref 0.4–1.2)
CREAT SERPL-MCNC: 1.4 MG/DL (ref 0.4–1.2)
CREAT SERPL-MCNC: 1.5 MG/DL (ref 0.4–1.2)
CREAT SERPL-MCNC: 1.8 MG/DL (ref 0.4–1.2)
CREAT SERPL-MCNC: 1.9 MG/DL (ref 0.4–1.2)
CREAT SERPL-MCNC: 2.1 MG/DL (ref 0.4–1.2)
CREAT SERPL-MCNC: 2.4 MG/DL (ref 0.4–1.2)
CREAT SERPL-MCNC: 2.4 MG/DL (ref 0.4–1.2)
CREAT SERPL-MCNC: 2.5 MG/DL (ref 0.4–1.2)
CREAT SERPL-MCNC: 2.5 MG/DL (ref 0.4–1.2)
CRENATED RBC'S: ABNORMAL
CRYSTALS, UA: ABNORMAL
DEVICE: ABNORMAL
DEVICE: NORMAL
EKG ATRIAL RATE: 156 BPM
EKG ATRIAL RATE: 198 BPM
EKG ATRIAL RATE: 202 BPM
EKG P AXIS: 99 DEGREES
EKG Q-T INTERVAL: 292 MS
EKG Q-T INTERVAL: 348 MS
EKG Q-T INTERVAL: 378 MS
EKG QRS DURATION: 106 MS
EKG QRS DURATION: 90 MS
EKG QRS DURATION: 92 MS
EKG QTC CALCULATION (BAZETT): 451 MS
EKG QTC CALCULATION (BAZETT): 473 MS
EKG QTC CALCULATION (BAZETT): 485 MS
EKG R AXIS: -5 DEGREES
EKG R AXIS: 41 DEGREES
EKG R AXIS: 84 DEGREES
EKG T AXIS: -90 DEGREES
EKG T AXIS: 126 DEGREES
EKG T AXIS: 173 DEGREES
EKG VENTRICULAR RATE: 101 BPM
EKG VENTRICULAR RATE: 158 BPM
EKG VENTRICULAR RATE: 99 BPM
ENTEROBACTER CLOACAE COMPLEX BY PCR: NOT DETECTED
EOSINOPHIL # BLD: 0.1 %
EOSINOPHIL # BLD: 0.1 %
EOSINOPHIL # BLD: 0.4 %
EOSINOPHIL # BLD: 0.4 %
EOSINOPHIL SMEAR: NORMAL
EOSINOPHILS ABSOLUTE: 0 THOU/MM3 (ref 0–0.4)
EOSINOPHILS ABSOLUTE: 0.1 THOU/MM3 (ref 0–0.4)
EPITHELIAL CELLS, UA: ABNORMAL /HPF
ERYTHROCYTE [DISTWIDTH] IN BLOOD BY AUTOMATED COUNT: 19.1 % (ref 11.5–14.5)
ERYTHROCYTE [DISTWIDTH] IN BLOOD BY AUTOMATED COUNT: 19.6 % (ref 11.5–14.5)
ERYTHROCYTE [DISTWIDTH] IN BLOOD BY AUTOMATED COUNT: 21.5 % (ref 11.5–14.5)
ERYTHROCYTE [DISTWIDTH] IN BLOOD BY AUTOMATED COUNT: 22.5 % (ref 11.5–14.5)
ERYTHROCYTE [DISTWIDTH] IN BLOOD BY AUTOMATED COUNT: 22.8 % (ref 11.5–14.5)
ERYTHROCYTE [DISTWIDTH] IN BLOOD BY AUTOMATED COUNT: 22.8 % (ref 11.5–14.5)
ERYTHROCYTE [DISTWIDTH] IN BLOOD BY AUTOMATED COUNT: 22.9 % (ref 11.5–14.5)
ERYTHROCYTE [DISTWIDTH] IN BLOOD BY AUTOMATED COUNT: 23.1 % (ref 11.5–14.5)
ERYTHROCYTE [DISTWIDTH] IN BLOOD BY AUTOMATED COUNT: 58 FL (ref 35–45)
ERYTHROCYTE [DISTWIDTH] IN BLOOD BY AUTOMATED COUNT: 61.2 FL (ref 35–45)
ERYTHROCYTE [DISTWIDTH] IN BLOOD BY AUTOMATED COUNT: 77 FL (ref 35–45)
ERYTHROCYTE [DISTWIDTH] IN BLOOD BY AUTOMATED COUNT: 78.4 FL (ref 35–45)
ERYTHROCYTE [DISTWIDTH] IN BLOOD BY AUTOMATED COUNT: 79 FL (ref 35–45)
ERYTHROCYTE [DISTWIDTH] IN BLOOD BY AUTOMATED COUNT: 82 FL (ref 35–45)
ERYTHROCYTE [DISTWIDTH] IN BLOOD BY AUTOMATED COUNT: 82.9 FL (ref 35–45)
ERYTHROCYTE [DISTWIDTH] IN BLOOD BY AUTOMATED COUNT: 86.5 FL (ref 35–45)
ESCHERICHIA COLI BY PCR: NOT DETECTED
FIO2, MIXED VENOUS: 30
GFR SERPL CREATININE-BSD FRML MDRD: 24 ML/MIN/1.73M2
GFR SERPL CREATININE-BSD FRML MDRD: 24 ML/MIN/1.73M2
GFR SERPL CREATININE-BSD FRML MDRD: 26 ML/MIN/1.73M2
GFR SERPL CREATININE-BSD FRML MDRD: 26 ML/MIN/1.73M2
GFR SERPL CREATININE-BSD FRML MDRD: 30 ML/MIN/1.73M2
GFR SERPL CREATININE-BSD FRML MDRD: 33 ML/MIN/1.73M2
GFR SERPL CREATININE-BSD FRML MDRD: 36 ML/MIN/1.73M2
GFR SERPL CREATININE-BSD FRML MDRD: 44 ML/MIN/1.73M2
GFR SERPL CREATININE-BSD FRML MDRD: 48 ML/MIN/1.73M2
GFR SERPL CREATININE-BSD FRML MDRD: 52 ML/MIN/1.73M2
GFR SERPL CREATININE-BSD FRML MDRD: 52 ML/MIN/1.73M2
GFR SERPL CREATININE-BSD FRML MDRD: 57 ML/MIN/1.73M2
GFR SERPL CREATININE-BSD FRML MDRD: 79 ML/MIN/1.73M2
GFR SERPL CREATININE-BSD FRML MDRD: 79 ML/MIN/1.73M2
GLUCOSE BLD-MCNC: 106 MG/DL (ref 70–108)
GLUCOSE BLD-MCNC: 112 MG/DL (ref 70–108)
GLUCOSE BLD-MCNC: 114 MG/DL (ref 70–108)
GLUCOSE BLD-MCNC: 115 MG/DL (ref 70–108)
GLUCOSE BLD-MCNC: 118 MG/DL (ref 70–108)
GLUCOSE BLD-MCNC: 124 MG/DL (ref 70–108)
GLUCOSE BLD-MCNC: 124 MG/DL (ref 70–108)
GLUCOSE BLD-MCNC: 128 MG/DL (ref 70–108)
GLUCOSE BLD-MCNC: 131 MG/DL (ref 70–108)
GLUCOSE BLD-MCNC: 132 MG/DL (ref 70–108)
GLUCOSE BLD-MCNC: 133 MG/DL (ref 70–108)
GLUCOSE BLD-MCNC: 136 MG/DL (ref 70–108)
GLUCOSE BLD-MCNC: 138 MG/DL (ref 70–108)
GLUCOSE BLD-MCNC: 149 MG/DL (ref 70–108)
GLUCOSE BLD-MCNC: 151 MG/DL (ref 70–108)
GLUCOSE BLD-MCNC: 99 MG/DL (ref 70–108)
GLUCOSE URINE: NEGATIVE MG/DL
GLUCOSE, WHOLE BLOOD: 139 MG/DL (ref 70–108)
GRAM STAIN RESULT: ABNORMAL
HAEMOPHILUS INFLUENZAE BY PCR: NOT DETECTED
HBA1C MFR BLD: 5.4 % (ref 4.4–6.4)
HCO3, MIXED: 20 MMOL/L (ref 23–28)
HCO3, MIXED: 23 MMOL/L (ref 23–28)
HCO3, MIXED: 23 MMOL/L (ref 23–28)
HCO3, MIXED: 24 MMOL/L (ref 23–28)
HCO3, MIXED: 24 MMOL/L (ref 23–28)
HCO3, MIXED: 25 MMOL/L (ref 23–28)
HCO3, MIXED: 25 MMOL/L (ref 23–28)
HCO3: 20 MMOL/L (ref 23–28)
HCO3: 21 MMOL/L (ref 23–28)
HCO3: 22 MMOL/L (ref 23–28)
HCT VFR BLD CALC: 20.4 % (ref 42–52)
HCT VFR BLD CALC: 23.2 % (ref 42–52)
HCT VFR BLD CALC: 27 % (ref 42–52)
HCT VFR BLD CALC: 27.7 % (ref 42–52)
HCT VFR BLD CALC: 29.7 % (ref 42–52)
HCT VFR BLD CALC: 31.3 % (ref 42–52)
HCT VFR BLD CALC: 31.4 % (ref 42–52)
HCT VFR BLD CALC: 32.1 % (ref 42–52)
HCT VFR BLD CALC: 32.8 % (ref 42–52)
HCT VFR BLD CALC: 36.9 % (ref 42–52)
HCT VFR BLD CALC: 38 % (ref 42–52)
HCT VFR BLD CALC: 38 % (ref 42–52)
HCT VFR BLD CALC: 39.3 % (ref 42–52)
HEMOGLOBIN: 10.2 GM/DL (ref 14–18)
HEMOGLOBIN: 10.7 GM/DL (ref 14–18)
HEMOGLOBIN: 10.7 GM/DL (ref 14–18)
HEMOGLOBIN: 11 GM/DL (ref 14–18)
HEMOGLOBIN: 11.1 GM/DL (ref 14–18)
HEMOGLOBIN: 11.5 GM/DL (ref 14–18)
HEMOGLOBIN: 11.7 GM/DL (ref 14–18)
HEMOGLOBIN: 12 GM/DL (ref 14–18)
HEMOGLOBIN: 12.5 GM/DL (ref 14–18)
HEMOGLOBIN: 7.7 GM/DL (ref 14–18)
HEMOGLOBIN: 8.4 GM/DL (ref 14–18)
HEMOGLOBIN: 9.6 GM/DL (ref 14–18)
HEMOGLOBIN: 9.8 GM/DL (ref 14–18)
IFIO2: 30
IFIO2: 30
IFIO2: 80
IMMATURE GRANS (ABS): 0.09 THOU/MM3 (ref 0–0.07)
IMMATURE GRANS (ABS): 0.13 THOU/MM3 (ref 0–0.07)
IMMATURE GRANS (ABS): 0.36 THOU/MM3 (ref 0–0.07)
IMMATURE GRANS (ABS): 0.39 THOU/MM3 (ref 0–0.07)
IMMATURE GRANULOCYTES: 1 %
IMMATURE GRANULOCYTES: 1.2 %
IMMATURE GRANULOCYTES: 2.3 %
IMMATURE GRANULOCYTES: 2.4 %
INFLUENZA A BY PCR: NOT DETECTED
INFLUENZA B BY PCR: NOT DETECTED
INR BLD: 1.63 (ref 0.85–1.13)
INR BLD: 2.07 (ref 0.85–1.13)
KETONES, URINE: ABNORMAL
KLEBSIELLA AEROGENES BY PCR: NOT DETECTED
KLEBSIELLA OXYTOCA BY PCR: NOT DETECTED
KLEBSIELLA PNEUMONIAE GROUP BY PCR: NOT DETECTED
LACTIC ACID, SEPSIS: 1.9 MMOL/L (ref 0.5–1.9)
LACTIC ACID, SEPSIS: 1.9 MMOL/L (ref 0.5–1.9)
LACTIC ACID: 2.3 MMOL/L (ref 0.5–2)
LACTIC ACID: 2.5 MMOL/L (ref 0.5–2)
LACTIC ACID: 3.8 MMOL/L (ref 0.5–2)
LD: > 2500 U/L (ref 100–190)
LEGIONELLA PNEUMOPHILIA BY PCR: NOT DETECTED
LEUKOCYTE ESTERASE, URINE: NEGATIVE
LV EF: 18 %
LV EF: 25 %
LVEF MODALITY: NORMAL
LVEF MODALITY: NORMAL
LYMPHOCYTES # BLD: 1.3 %
LYMPHOCYTES # BLD: 11.8 %
LYMPHOCYTES # BLD: 2.7 %
LYMPHOCYTES # BLD: 6.1 %
LYMPHOCYTES ABSOLUTE: 0.2 THOU/MM3 (ref 1–4.8)
LYMPHOCYTES ABSOLUTE: 0.4 THOU/MM3 (ref 1–4.8)
LYMPHOCYTES ABSOLUTE: 0.7 THOU/MM3 (ref 1–4.8)
LYMPHOCYTES ABSOLUTE: 1.1 THOU/MM3 (ref 1–4.8)
MAGNESIUM: 2 MG/DL (ref 1.6–2.4)
MAGNESIUM: 2 MG/DL (ref 1.6–2.4)
MAGNESIUM: 2.1 MG/DL (ref 1.6–2.4)
MAGNESIUM: 2.2 MG/DL (ref 1.6–2.4)
MAGNESIUM: 2.2 MG/DL (ref 1.6–2.4)
MAGNESIUM: 2.5 MG/DL (ref 1.6–2.4)
MAGNESIUM: 2.5 MG/DL (ref 1.6–2.4)
MCH RBC QN AUTO: 29.6 PG (ref 26–33)
MCH RBC QN AUTO: 30 PG (ref 26–33)
MCH RBC QN AUTO: 30.3 PG (ref 26–33)
MCH RBC QN AUTO: 30.3 PG (ref 26–33)
MCH RBC QN AUTO: 30.4 PG (ref 26–33)
MCH RBC QN AUTO: 31.2 PG (ref 26–33)
MCH RBC QN AUTO: 32.3 PG (ref 26–33)
MCH RBC QN AUTO: 34.4 PG (ref 26–33)
MCHC RBC AUTO-ENTMCNC: 30.8 GM/DL (ref 32.2–35.5)
MCHC RBC AUTO-ENTMCNC: 31.2 GM/DL (ref 32.2–35.5)
MCHC RBC AUTO-ENTMCNC: 31.6 GM/DL (ref 32.2–35.5)
MCHC RBC AUTO-ENTMCNC: 31.8 GM/DL (ref 32.2–35.5)
MCHC RBC AUTO-ENTMCNC: 34.2 GM/DL (ref 32.2–35.5)
MCHC RBC AUTO-ENTMCNC: 34.3 GM/DL (ref 32.2–35.5)
MCHC RBC AUTO-ENTMCNC: 34.3 GM/DL (ref 32.2–35.5)
MCHC RBC AUTO-ENTMCNC: 36.3 GM/DL (ref 32.2–35.5)
MCV RBC AUTO: 101.3 FL (ref 80–94)
MCV RBC AUTO: 86.7 FL (ref 80–94)
MCV RBC AUTO: 87.4 FL (ref 80–94)
MCV RBC AUTO: 94.1 FL (ref 80–94)
MCV RBC AUTO: 94.7 FL (ref 80–94)
MCV RBC AUTO: 95.4 FL (ref 80–94)
MCV RBC AUTO: 96.2 FL (ref 80–94)
MCV RBC AUTO: 97.4 FL (ref 80–94)
METAPNEUMOVIRUS BY PCR: NOT DETECTED
MISCELLANEOUS 2: ABNORMAL
MODE: ABNORMAL
MODE: NORMAL
MONOCYTES # BLD: 12.7 %
MONOCYTES # BLD: 7.1 %
MONOCYTES # BLD: 7.7 %
MONOCYTES # BLD: 8.7 %
MONOCYTES ABSOLUTE: 0.9 THOU/MM3 (ref 0.4–1.3)
MONOCYTES ABSOLUTE: 1.1 THOU/MM3 (ref 0.4–1.3)
MONOCYTES ABSOLUTE: 1.2 THOU/MM3 (ref 0.4–1.3)
MONOCYTES ABSOLUTE: 1.4 THOU/MM3 (ref 0.4–1.3)
MORAXELLA CATARRHALIS BY PCR: NOT DETECTED
MUCUS: ABNORMAL
MYCOPLASMA PNEUMONIAE BY PCR: NOT DETECTED
NITRITE, URINE: NEGATIVE
NON-SARS CORONAVIRUS: NOT DETECTED
NUCLEATED RED BLOOD CELLS: 0 /100 WBC
NUCLEATED RED BLOOD CELLS: 0 /100 WBC
NUCLEATED RED BLOOD CELLS: 1 /100 WBC
NUCLEATED RED BLOOD CELLS: 2 /100 WBC
O2 SAT, MIXED: 50 %
O2 SAT, MIXED: 50 %
O2 SAT, MIXED: 56 %
O2 SAT, MIXED: 58 %
O2 SAT, MIXED: 61 %
O2 SAT, MIXED: 66 %
O2 SAT, MIXED: 68 %
O2 SAT, MIXED: 70 %
O2 SAT, MIXED: 73 %
O2 SAT, MIXED: 79 %
O2 SATURATION: 100 %
O2 SATURATION: 97 %
O2 SATURATION: 98 %
ORGANISM: ABNORMAL
OSMOLALITY CALCULATION: 295.8 MOSMOL/KG (ref 275–300)
PARAINFLUENZA VIRUS BY PCR: NOT DETECTED
PCO2, MIXED VENOUS: 27 MMHG (ref 41–51)
PCO2, MIXED VENOUS: 31 MMHG (ref 41–51)
PCO2, MIXED VENOUS: 32 MMHG (ref 41–51)
PCO2, MIXED VENOUS: 33 MMHG (ref 41–51)
PCO2, MIXED VENOUS: 37 MMHG (ref 41–51)
PCO2, MIXED VENOUS: 39 MMHG (ref 41–51)
PCO2, MIXED VENOUS: 41 MMHG (ref 41–51)
PCO2, MIXED VENOUS: 45 MMHG (ref 41–51)
PCO2, MIXED VENOUS: 46 MMHG (ref 41–51)
PCO2, MIXED VENOUS: 47 MMHG (ref 41–51)
PCO2: 25 MMHG (ref 35–45)
PCO2: 33 MMHG (ref 35–45)
PCO2: 34 MMHG (ref 35–45)
PH BLOOD GAS: 7.4 (ref 7.35–7.45)
PH BLOOD GAS: 7.42 (ref 7.35–7.45)
PH BLOOD GAS: 7.51 (ref 7.35–7.45)
PH UA: 5 (ref 5–9)
PH, MIXED: 7.31 (ref 7.31–7.41)
PH, MIXED: 7.34 (ref 7.31–7.41)
PH, MIXED: 7.35 (ref 7.31–7.41)
PH, MIXED: 7.35 (ref 7.31–7.41)
PH, MIXED: 7.39 (ref 7.31–7.41)
PH, MIXED: 7.42 (ref 7.31–7.41)
PH, MIXED: 7.42 (ref 7.31–7.41)
PH, MIXED: 7.46 (ref 7.31–7.41)
PHOSPHORUS: 3.3 MG/DL (ref 2.4–4.7)
PHOSPHORUS: 4 MG/DL (ref 2.4–4.7)
PHOSPHORUS: 5.6 MG/DL (ref 2.4–4.7)
PHOSPHORUS: 6.3 MG/DL (ref 2.4–4.7)
PHOSPHORUS: 6.6 MG/DL (ref 2.4–4.7)
PHOSPHORUS: 6.8 MG/DL (ref 2.4–4.7)
PIP: 20 CMH2O
PIP: 20 CMH2O
PIP: 22 CMH2O
PIP: 22 CMH2O
PIP: 24 CMH2O
PLATELET # BLD: 151 THOU/MM3 (ref 130–400)
PLATELET # BLD: 250 THOU/MM3 (ref 130–400)
PLATELET # BLD: 271 THOU/MM3 (ref 130–400)
PLATELET # BLD: 277 THOU/MM3 (ref 130–400)
PLATELET # BLD: 278 THOU/MM3 (ref 130–400)
PLATELET # BLD: 281 THOU/MM3 (ref 130–400)
PLATELET # BLD: 296 THOU/MM3 (ref 130–400)
PLATELET # BLD: 81 THOU/MM3 (ref 130–400)
PLATELET # BLD: 92 THOU/MM3 (ref 130–400)
PLATELET ESTIMATE: ADEQUATE
PLATELET ESTIMATE: ADEQUATE
PMV BLD AUTO: 10 FL (ref 9.4–12.4)
PMV BLD AUTO: 10.4 FL (ref 9.4–12.4)
PMV BLD AUTO: 10.6 FL (ref 9.4–12.4)
PMV BLD AUTO: 10.6 FL (ref 9.4–12.4)
PMV BLD AUTO: 11.5 FL (ref 9.4–12.4)
PMV BLD AUTO: 11.9 FL (ref 9.4–12.4)
PMV BLD AUTO: 13 FL (ref 9.4–12.4)
PMV BLD AUTO: ABNORMAL FL (ref 9.4–12.4)
PO2 MIXED: 27 MMHG (ref 25–40)
PO2 MIXED: 29 MMHG (ref 25–40)
PO2 MIXED: 31 MMHG (ref 25–40)
PO2 MIXED: 31 MMHG (ref 25–40)
PO2 MIXED: 34 MMHG (ref 25–40)
PO2 MIXED: 34 MMHG (ref 25–40)
PO2 MIXED: 35 MMHG (ref 25–40)
PO2 MIXED: 37 MMHG (ref 25–40)
PO2 MIXED: 39 MMHG (ref 25–40)
PO2 MIXED: 40 MMHG (ref 25–40)
PO2: 103 MMHG (ref 71–104)
PO2: 419 MMHG (ref 71–104)
PO2: 88 MMHG (ref 71–104)
POC ACTIVATED CLOTTING TIME KAOLIN: 142 SECONDS (ref 1–150)
POC ACTIVATED CLOTTING TIME KAOLIN: 147 SECONDS (ref 1–150)
POC ACTIVATED CLOTTING TIME KAOLIN: 147 SECONDS (ref 1–150)
POC ACTIVATED CLOTTING TIME KAOLIN: 153 SECONDS (ref 1–150)
POC ACTIVATED CLOTTING TIME KAOLIN: 153 SECONDS (ref 1–150)
POC ACTIVATED CLOTTING TIME KAOLIN: 158 SECONDS (ref 1–150)
POC ACTIVATED CLOTTING TIME KAOLIN: 164 SECONDS (ref 1–150)
POC ACTIVATED CLOTTING TIME KAOLIN: 169 SECONDS (ref 1–150)
POC ACTIVATED CLOTTING TIME KAOLIN: 180 SECONDS (ref 1–150)
POC ACTIVATED CLOTTING TIME KAOLIN: 186 SECONDS (ref 1–150)
POC ACTIVATED CLOTTING TIME KAOLIN: 202 SECONDS (ref 1–150)
POC LACTIC ACID: 1.8 MMOL/L (ref 0.5–1.9)
POC O2 SATURATION: 35 % (ref 94–97)
POC O2 SATURATION: 58 % (ref 94–97)
POC O2 SATURATION: 99 % (ref 94–97)
POIKILOCYTES: ABNORMAL
POTASSIUM REFLEX MAGNESIUM: 4.8 MEQ/L (ref 3.5–5.2)
POTASSIUM REFLEX MAGNESIUM: 5.2 MEQ/L (ref 3.5–5.2)
POTASSIUM REFLEX MAGNESIUM: 5.2 MEQ/L (ref 3.5–5.2)
POTASSIUM REFLEX MAGNESIUM: 5.3 MEQ/L (ref 3.5–5.2)
POTASSIUM REFLEX MAGNESIUM: 5.4 MEQ/L (ref 3.5–5.2)
POTASSIUM SERPL-SCNC: 4.2 MEQ/L (ref 3.5–5.2)
POTASSIUM SERPL-SCNC: 4.3 MEQ/L (ref 3.5–5.2)
POTASSIUM SERPL-SCNC: 4.4 MEQ/L (ref 3.5–5.2)
POTASSIUM SERPL-SCNC: 4.6 MEQ/L (ref 3.5–5.2)
POTASSIUM SERPL-SCNC: 4.6 MEQ/L (ref 3.5–5.2)
POTASSIUM SERPL-SCNC: 4.7 MEQ/L (ref 3.5–5.2)
POTASSIUM SERPL-SCNC: 4.8 MEQ/L (ref 3.5–5.2)
POTASSIUM SERPL-SCNC: 4.9 MEQ/L (ref 3.5–5.2)
POTASSIUM SERPL-SCNC: 5 MEQ/L (ref 3.5–5.2)
POTASSIUM SERPL-SCNC: 5.2 MEQ/L (ref 3.5–5.2)
POTASSIUM SERPL-SCNC: 5.4 MEQ/L (ref 3.5–5.2)
PRO-BNP: ABNORMAL PG/ML (ref 0–1800)
PROCALCITONIN: 0.05 NG/ML (ref 0.01–0.09)
PROCALCITONIN: 0.11 NG/ML (ref 0.01–0.09)
PROTEIN UA: 30
PROTEUS SPECIES BY PCR: NOT DETECTED
PSEUDOMONAS AERUGINOSA BY PCR: DETECTED
RBC # BLD: 2.85 MILL/MM3 (ref 4.7–6.1)
RBC # BLD: 3.4 MILL/MM3 (ref 4.7–6.1)
RBC # BLD: 3.41 MILL/MM3 (ref 4.7–6.1)
RBC # BLD: 3.61 MILL/MM3 (ref 4.7–6.1)
RBC # BLD: 3.75 MILL/MM3 (ref 4.7–6.1)
RBC # BLD: 3.79 MILL/MM3 (ref 4.7–6.1)
RBC # BLD: 3.95 MILL/MM3 (ref 4.7–6.1)
RBC # BLD: 4.12 MILL/MM3 (ref 4.7–6.1)
RBC URINE: ABNORMAL /HPF
RENAL EPITHELIAL, UA: ABNORMAL
RESISTANT GENE CTX-M BY PCR: NOT DETECTED
RESISTANT GENE IMP BY PCR: NOT DETECTED
RESISTANT GENE KPC BY PCR: NOT DETECTED
RESISTANT GENE MECA/C & MREJ BY PCR: ABNORMAL
RESISTANT GENE NDM BY PCR: NOT DETECTED
RESISTANT GENE OXA-48-LIKE BY PCR: ABNORMAL
RESISTANT GENE VIM BY PCR: NOT DETECTED
RESPIRATORY CULTURE: ABNORMAL
RESPIRATORY CULTURE: ABNORMAL
RESPIRATORY SYNCYTIAL VIRUS BY PCR: NOT DETECTED
RH FACTOR: NORMAL
RHINOVIRUS ENTEROVIRUS PCR: NOT DETECTED
SARS-COV-2, NAAT: NOT DETECTED
SCAN OF BLOOD SMEAR: NORMAL
SCAN OF BLOOD SMEAR: NORMAL
SEG NEUTROPHILS: 73.8 %
SEG NEUTROPHILS: 83.8 %
SEG NEUTROPHILS: 85.5 %
SEG NEUTROPHILS: 88.7 %
SEGMENTED NEUTROPHILS ABSOLUTE COUNT: 13.5 THOU/MM3 (ref 1.8–7.7)
SEGMENTED NEUTROPHILS ABSOLUTE COUNT: 14.5 THOU/MM3 (ref 1.8–7.7)
SEGMENTED NEUTROPHILS ABSOLUTE COUNT: 6.6 THOU/MM3 (ref 1.8–7.7)
SEGMENTED NEUTROPHILS ABSOLUTE COUNT: 9.4 THOU/MM3 (ref 1.8–7.7)
SERRATIA MARCESCENS BY PCR: NOT DETECTED
SET PEEP: 6 MMHG
SET PRESS SUPP: 14 CMH2O
SET PRESS SUPP: 14 CMH2O
SET PRESS SUPP: 16 CMH2O
SET PRESS SUPP: 16 CMH2O
SET RESPIRATORY RATE: 12 BPM
SET RESPIRATORY RATE: 16 BPM
SET RESPIRATORY RATE: 16 BPM
SITE: ABNORMAL
SITE: NORMAL
SODIUM BLD-SCNC: 131 MEQ/L (ref 135–145)
SODIUM BLD-SCNC: 132 MEQ/L (ref 135–145)
SODIUM BLD-SCNC: 133 MEQ/L (ref 135–145)
SODIUM BLD-SCNC: 134 MEQ/L (ref 135–145)
SODIUM BLD-SCNC: 137 MEQ/L (ref 135–145)
SODIUM BLD-SCNC: 138 MEQ/L (ref 135–145)
SODIUM BLD-SCNC: 138 MEQ/L (ref 135–145)
SODIUM BLD-SCNC: 139 MEQ/L (ref 135–145)
SODIUM BLD-SCNC: 139 MEQ/L (ref 135–145)
SODIUM BLD-SCNC: 141 MEQ/L (ref 135–145)
SODIUM BLD-SCNC: 142 MEQ/L (ref 135–145)
SODIUM BLD-SCNC: 145 MEQ/L (ref 135–145)
SOURCE, BLOOD GAS: ABNORMAL
SOURCE: ABNORMAL
SPECIFIC GRAVITY, URINE: 1.02 (ref 1–1.03)
SPECIMEN ACCEPTABILITY: ABNORMAL
SPECIMEN: NORMAL
STAPH AUREUS BY PCR: NOT DETECTED
STREP AGALACTIAE BY PCR: NOT DETECTED
STREP PNEUMONIAE BY PCR: NOT DETECTED
STREP PYOGENES BY PCR: NOT DETECTED
TARGET CELLS: ABNORMAL
TOTAL PROTEIN: 5.2 G/DL (ref 6.1–8)
TOTAL PROTEIN: 5.4 G/DL (ref 6.1–8)
TOTAL PROTEIN: 5.8 G/DL (ref 6.1–8)
TROPONIN T: 0.34 NG/ML
TROPONIN T: 0.79 NG/ML
TROPONIN T: 0.91 NG/ML
TSH SERPL DL<=0.05 MIU/L-ACNC: 0.73 UIU/ML (ref 0.4–4.2)
UROBILINOGEN, URINE: 1 EU/DL (ref 0–1)
WBC # BLD: 11.2 THOU/MM3 (ref 4.8–10.8)
WBC # BLD: 15.4 THOU/MM3 (ref 4.8–10.8)
WBC # BLD: 15.8 THOU/MM3 (ref 4.8–10.8)
WBC # BLD: 16.2 THOU/MM3 (ref 4.8–10.8)
WBC # BLD: 16.3 THOU/MM3 (ref 4.8–10.8)
WBC # BLD: 6.7 THOU/MM3 (ref 4.8–10.8)
WBC # BLD: 8.7 THOU/MM3 (ref 4.8–10.8)
WBC # BLD: 8.9 THOU/MM3 (ref 4.8–10.8)
WBC UA: ABNORMAL /HPF
YEAST: ABNORMAL

## 2021-01-01 PROCEDURE — 99232 SBSQ HOSP IP/OBS MODERATE 35: CPT | Performed by: INTERNAL MEDICINE

## 2021-01-01 PROCEDURE — 83735 ASSAY OF MAGNESIUM: CPT

## 2021-01-01 PROCEDURE — 6370000000 HC RX 637 (ALT 250 FOR IP)

## 2021-01-01 PROCEDURE — 71275 CT ANGIOGRAPHY CHEST: CPT

## 2021-01-01 PROCEDURE — 33993 REPOSG PERQ R/L HRT VAD: CPT | Performed by: INTERNAL MEDICINE

## 2021-01-01 PROCEDURE — 85347 COAGULATION TIME ACTIVATED: CPT

## 2021-01-01 PROCEDURE — 6370000000 HC RX 637 (ALT 250 FOR IP): Performed by: FAMILY MEDICINE

## 2021-01-01 PROCEDURE — 99214 OFFICE O/P EST MOD 30 MIN: CPT | Performed by: INTERNAL MEDICINE

## 2021-01-01 PROCEDURE — 1036F TOBACCO NON-USER: CPT | Performed by: NURSE PRACTITIONER

## 2021-01-01 PROCEDURE — 36415 COLL VENOUS BLD VENIPUNCTURE: CPT | Performed by: NURSE PRACTITIONER

## 2021-01-01 PROCEDURE — 2500000003 HC RX 250 WO HCPCS: Performed by: NURSE PRACTITIONER

## 2021-01-01 PROCEDURE — 37799 UNLISTED PX VASCULAR SURGERY: CPT

## 2021-01-01 PROCEDURE — 97110 THERAPEUTIC EXERCISES: CPT

## 2021-01-01 PROCEDURE — 36415 COLL VENOUS BLD VENIPUNCTURE: CPT

## 2021-01-01 PROCEDURE — 5A1945Z RESPIRATORY VENTILATION, 24-96 CONSECUTIVE HOURS: ICD-10-PCS | Performed by: INTERNAL MEDICINE

## 2021-01-01 PROCEDURE — 6370000000 HC RX 637 (ALT 250 FOR IP): Performed by: NURSE PRACTITIONER

## 2021-01-01 PROCEDURE — 85027 COMPLETE CBC AUTOMATED: CPT

## 2021-01-01 PROCEDURE — 2580000003 HC RX 258: Performed by: INTERNAL MEDICINE

## 2021-01-01 PROCEDURE — C1874 STENT, COATED/COV W/DEL SYS: HCPCS

## 2021-01-01 PROCEDURE — C1725 CATH, TRANSLUMIN NON-LASER: HCPCS

## 2021-01-01 PROCEDURE — 82947 ASSAY GLUCOSE BLOOD QUANT: CPT

## 2021-01-01 PROCEDURE — 2500000003 HC RX 250 WO HCPCS

## 2021-01-01 PROCEDURE — 6360000002 HC RX W HCPCS: Performed by: INTERNAL MEDICINE

## 2021-01-01 PROCEDURE — 85014 HEMATOCRIT: CPT

## 2021-01-01 PROCEDURE — 71045 X-RAY EXAM CHEST 1 VIEW: CPT

## 2021-01-01 PROCEDURE — 6360000002 HC RX W HCPCS: Performed by: STUDENT IN AN ORGANIZED HEALTH CARE EDUCATION/TRAINING PROGRAM

## 2021-01-01 PROCEDURE — 4040F PNEUMOC VAC/ADMIN/RCVD: CPT | Performed by: NURSE PRACTITIONER

## 2021-01-01 PROCEDURE — 6370000000 HC RX 637 (ALT 250 FOR IP): Performed by: INTERNAL MEDICINE

## 2021-01-01 PROCEDURE — 1123F ACP DISCUSS/DSCN MKR DOCD: CPT | Performed by: NURSE PRACTITIONER

## 2021-01-01 PROCEDURE — 86923 COMPATIBILITY TEST ELECTRIC: CPT

## 2021-01-01 PROCEDURE — 2500000003 HC RX 250 WO HCPCS: Performed by: INTERNAL MEDICINE

## 2021-01-01 PROCEDURE — 86850 RBC ANTIBODY SCREEN: CPT

## 2021-01-01 PROCEDURE — 80048 BASIC METABOLIC PNL TOTAL CA: CPT

## 2021-01-01 PROCEDURE — 82330 ASSAY OF CALCIUM: CPT

## 2021-01-01 PROCEDURE — 02PA3RZ REMOVAL OF SHORT-TERM EXTERNAL HEART ASSIST SYSTEM FROM HEART, PERCUTANEOUS APPROACH: ICD-10-PCS | Performed by: INTERNAL MEDICINE

## 2021-01-01 PROCEDURE — 6360000002 HC RX W HCPCS

## 2021-01-01 PROCEDURE — 36430 TRANSFUSION BLD/BLD COMPNT: CPT

## 2021-01-01 PROCEDURE — 84484 ASSAY OF TROPONIN QUANT: CPT

## 2021-01-01 PROCEDURE — 83615 LACTATE (LD) (LDH) ENZYME: CPT

## 2021-01-01 PROCEDURE — 83605 ASSAY OF LACTIC ACID: CPT

## 2021-01-01 PROCEDURE — 1123F ACP DISCUSS/DSCN MKR DOCD: CPT | Performed by: PHYSICIAN ASSISTANT

## 2021-01-01 PROCEDURE — 6360000004 HC RX CONTRAST MEDICATION: Performed by: INTERNAL MEDICINE

## 2021-01-01 PROCEDURE — 6360000002 HC RX W HCPCS: Performed by: NURSE PRACTITIONER

## 2021-01-01 PROCEDURE — 0W993ZZ DRAINAGE OF RIGHT PLEURAL CAVITY, PERCUTANEOUS APPROACH: ICD-10-PCS | Performed by: RADIOLOGY

## 2021-01-01 PROCEDURE — 87798 DETECT AGENT NOS DNA AMP: CPT

## 2021-01-01 PROCEDURE — 85730 THROMBOPLASTIN TIME PARTIAL: CPT

## 2021-01-01 PROCEDURE — 87635 SARS-COV-2 COVID-19 AMP PRB: CPT

## 2021-01-01 PROCEDURE — 4040F PNEUMOC VAC/ADMIN/RCVD: CPT | Performed by: INTERNAL MEDICINE

## 2021-01-01 PROCEDURE — 0BH18EZ INSERTION OF ENDOTRACHEAL AIRWAY INTO TRACHEA, VIA NATURAL OR ARTIFICIAL OPENING ENDOSCOPIC: ICD-10-PCS | Performed by: SURGERY

## 2021-01-01 PROCEDURE — 6360000002 HC RX W HCPCS: Performed by: FAMILY MEDICINE

## 2021-01-01 PROCEDURE — 92960 CARDIOVERSION ELECTRIC EXT: CPT | Performed by: INTERNAL MEDICINE

## 2021-01-01 PROCEDURE — 94761 N-INVAS EAR/PLS OXIMETRY MLT: CPT

## 2021-01-01 PROCEDURE — 94003 VENT MGMT INPAT SUBQ DAY: CPT

## 2021-01-01 PROCEDURE — 82810 BLOOD GASES O2 SAT ONLY: CPT

## 2021-01-01 PROCEDURE — 87205 SMEAR GRAM STAIN: CPT

## 2021-01-01 PROCEDURE — 2580000003 HC RX 258: Performed by: NURSE PRACTITIONER

## 2021-01-01 PROCEDURE — 96375 TX/PRO/DX INJ NEW DRUG ADDON: CPT

## 2021-01-01 PROCEDURE — C9600 PERC DRUG-EL COR STENT SING: HCPCS | Performed by: INTERNAL MEDICINE

## 2021-01-01 PROCEDURE — 90945 DIALYSIS ONE EVALUATION: CPT

## 2021-01-01 PROCEDURE — 93320 DOPPLER ECHO COMPLETE: CPT

## 2021-01-01 PROCEDURE — 33990 INSJ PERQ VAD L HRT ARTERIAL: CPT | Performed by: INTERNAL MEDICINE

## 2021-01-01 PROCEDURE — 2000000000 HC ICU R&B

## 2021-01-01 PROCEDURE — 99285 EMERGENCY DEPT VISIT HI MDM: CPT

## 2021-01-01 PROCEDURE — 2500000003 HC RX 250 WO HCPCS: Performed by: STUDENT IN AN ORGANIZED HEALTH CARE EDUCATION/TRAINING PROGRAM

## 2021-01-01 PROCEDURE — 36556 INSERT NON-TUNNEL CV CATH: CPT | Performed by: SURGERY

## 2021-01-01 PROCEDURE — G8420 CALC BMI NORM PARAMETERS: HCPCS | Performed by: NUCLEAR MEDICINE

## 2021-01-01 PROCEDURE — 85025 COMPLETE CBC W/AUTO DIFF WBC: CPT

## 2021-01-01 PROCEDURE — 85610 PROTHROMBIN TIME: CPT

## 2021-01-01 PROCEDURE — 2700000000 HC OXYGEN THERAPY PER DAY

## 2021-01-01 PROCEDURE — 93005 ELECTROCARDIOGRAM TRACING: CPT | Performed by: STUDENT IN AN ORGANIZED HEALTH CARE EDUCATION/TRAINING PROGRAM

## 2021-01-01 PROCEDURE — 99214 OFFICE O/P EST MOD 30 MIN: CPT | Performed by: NURSE PRACTITIONER

## 2021-01-01 PROCEDURE — 85049 AUTOMATED PLATELET COUNT: CPT

## 2021-01-01 PROCEDURE — 80053 COMPREHEN METABOLIC PANEL: CPT

## 2021-01-01 PROCEDURE — 36620 INSERTION CATHETER ARTERY: CPT | Performed by: INTERNAL MEDICINE

## 2021-01-01 PROCEDURE — 92986 REVISION OF AORTIC VALVE: CPT | Performed by: INTERNAL MEDICINE

## 2021-01-01 PROCEDURE — 31500 INSERT EMERGENCY AIRWAY: CPT | Performed by: SURGERY

## 2021-01-01 PROCEDURE — 1036F TOBACCO NON-USER: CPT | Performed by: NUCLEAR MEDICINE

## 2021-01-01 PROCEDURE — 87631 RESP VIRUS 3-5 TARGETS: CPT

## 2021-01-01 PROCEDURE — 93880 EXTRACRANIAL BILAT STUDY: CPT

## 2021-01-01 PROCEDURE — 89190 NASAL SMEAR FOR EOSINOPHILS: CPT

## 2021-01-01 PROCEDURE — 84100 ASSAY OF PHOSPHORUS: CPT

## 2021-01-01 PROCEDURE — 83010 ASSAY OF HAPTOGLOBIN QUANT: CPT

## 2021-01-01 PROCEDURE — 74174 CTA ABD&PLVS W/CONTRAST: CPT

## 2021-01-01 PROCEDURE — P9047 ALBUMIN (HUMAN), 25%, 50ML: HCPCS | Performed by: INTERNAL MEDICINE

## 2021-01-01 PROCEDURE — G8427 DOCREV CUR MEDS BY ELIG CLIN: HCPCS | Performed by: NURSE PRACTITIONER

## 2021-01-01 PROCEDURE — G8427 DOCREV CUR MEDS BY ELIG CLIN: HCPCS | Performed by: INTERNAL MEDICINE

## 2021-01-01 PROCEDURE — 82803 BLOOD GASES ANY COMBINATION: CPT

## 2021-01-01 PROCEDURE — 05PYX3Z REMOVAL OF INFUSION DEVICE FROM UPPER VEIN, EXTERNAL APPROACH: ICD-10-PCS | Performed by: SURGERY

## 2021-01-01 PROCEDURE — 2580000003 HC RX 258: Performed by: STUDENT IN AN ORGANIZED HEALTH CARE EDUCATION/TRAINING PROGRAM

## 2021-01-01 PROCEDURE — G8420 CALC BMI NORM PARAMETERS: HCPCS | Performed by: INTERNAL MEDICINE

## 2021-01-01 PROCEDURE — 5A2204Z RESTORATION OF CARDIAC RHYTHM, SINGLE: ICD-10-PCS | Performed by: INTERNAL MEDICINE

## 2021-01-01 PROCEDURE — 2580000003 HC RX 258: Performed by: FAMILY MEDICINE

## 2021-01-01 PROCEDURE — 93312 ECHO TRANSESOPHAGEAL: CPT

## 2021-01-01 PROCEDURE — 93010 ELECTROCARDIOGRAM REPORT: CPT | Performed by: INTERNAL MEDICINE

## 2021-01-01 PROCEDURE — B2111ZZ FLUOROSCOPY OF MULTIPLE CORONARY ARTERIES USING LOW OSMOLAR CONTRAST: ICD-10-PCS | Performed by: INTERNAL MEDICINE

## 2021-01-01 PROCEDURE — 93325 DOPPLER ECHO COLOR FLOW MAPG: CPT

## 2021-01-01 PROCEDURE — 99291 CRITICAL CARE FIRST HOUR: CPT | Performed by: INTERNAL MEDICINE

## 2021-01-01 PROCEDURE — 84145 PROCALCITONIN (PCT): CPT

## 2021-01-01 PROCEDURE — 5A1D90Z PERFORMANCE OF URINARY FILTRATION, CONTINUOUS, GREATER THAN 18 HOURS PER DAY: ICD-10-PCS | Performed by: INTERNAL MEDICINE

## 2021-01-01 PROCEDURE — 96374 THER/PROPH/DIAG INJ IV PUSH: CPT | Performed by: NURSE PRACTITIONER

## 2021-01-01 PROCEDURE — 82140 ASSAY OF AMMONIA: CPT

## 2021-01-01 PROCEDURE — 99204 OFFICE O/P NEW MOD 45 MIN: CPT | Performed by: PHYSICIAN ASSISTANT

## 2021-01-01 PROCEDURE — C1769 GUIDE WIRE: HCPCS

## 2021-01-01 PROCEDURE — 05HY33Z INSERTION OF INFUSION DEVICE INTO UPPER VEIN, PERCUTANEOUS APPROACH: ICD-10-PCS | Performed by: STUDENT IN AN ORGANIZED HEALTH CARE EDUCATION/TRAINING PROGRAM

## 2021-01-01 PROCEDURE — 89220 SPUTUM SPECIMEN COLLECTION: CPT

## 2021-01-01 PROCEDURE — 85018 HEMOGLOBIN: CPT

## 2021-01-01 PROCEDURE — 99215 OFFICE O/P EST HI 40 MIN: CPT | Performed by: NURSE PRACTITIONER

## 2021-01-01 PROCEDURE — 1123F ACP DISCUSS/DSCN MKR DOCD: CPT | Performed by: NUCLEAR MEDICINE

## 2021-01-01 PROCEDURE — 6370000000 HC RX 637 (ALT 250 FOR IP): Performed by: STUDENT IN AN ORGANIZED HEALTH CARE EDUCATION/TRAINING PROGRAM

## 2021-01-01 PROCEDURE — C1760 CLOSURE DEV, VASC: HCPCS

## 2021-01-01 PROCEDURE — 87486 CHLMYD PNEUM DNA AMP PROBE: CPT

## 2021-01-01 PROCEDURE — C1751 CATH, INF, PER/CENT/MIDLINE: HCPCS

## 2021-01-01 PROCEDURE — 87040 BLOOD CULTURE FOR BACTERIA: CPT

## 2021-01-01 PROCEDURE — 82248 BILIRUBIN DIRECT: CPT

## 2021-01-01 PROCEDURE — G8427 DOCREV CUR MEDS BY ELIG CLIN: HCPCS | Performed by: PHYSICIAN ASSISTANT

## 2021-01-01 PROCEDURE — 5A0221D ASSISTANCE WITH CARDIAC OUTPUT USING IMPELLER PUMP, CONTINUOUS: ICD-10-PCS | Performed by: INTERNAL MEDICINE

## 2021-01-01 PROCEDURE — 87070 CULTURE OTHR SPECIMN AEROBIC: CPT

## 2021-01-01 PROCEDURE — 83880 ASSAY OF NATRIURETIC PEPTIDE: CPT

## 2021-01-01 PROCEDURE — 02HV33Z INSERTION OF INFUSION DEVICE INTO SUPERIOR VENA CAVA, PERCUTANEOUS APPROACH: ICD-10-PCS | Performed by: INTERNAL MEDICINE

## 2021-01-01 PROCEDURE — 84443 ASSAY THYROID STIM HORMONE: CPT

## 2021-01-01 PROCEDURE — 83036 HEMOGLOBIN GLYCOSYLATED A1C: CPT

## 2021-01-01 PROCEDURE — 1036F TOBACCO NON-USER: CPT | Performed by: PHYSICIAN ASSISTANT

## 2021-01-01 PROCEDURE — 2500000003 HC RX 250 WO HCPCS: Performed by: EMERGENCY MEDICINE

## 2021-01-01 PROCEDURE — 93306 TTE W/DOPPLER COMPLETE: CPT

## 2021-01-01 PROCEDURE — 32555 ASPIRATE PLEURA W/ IMAGING: CPT

## 2021-01-01 PROCEDURE — G8420 CALC BMI NORM PARAMETERS: HCPCS | Performed by: NURSE PRACTITIONER

## 2021-01-01 PROCEDURE — 82533 TOTAL CORTISOL: CPT

## 2021-01-01 PROCEDURE — G8420 CALC BMI NORM PARAMETERS: HCPCS | Performed by: PHYSICIAN ASSISTANT

## 2021-01-01 PROCEDURE — 87581 M.PNEUMON DNA AMP PROBE: CPT

## 2021-01-01 PROCEDURE — 93307 TTE W/O DOPPLER COMPLETE: CPT

## 2021-01-01 PROCEDURE — 4040F PNEUMOC VAC/ADMIN/RCVD: CPT | Performed by: NUCLEAR MEDICINE

## 2021-01-01 PROCEDURE — 4A023N8 MEASUREMENT OF CARDIAC SAMPLING AND PRESSURE, BILATERAL, PERCUTANEOUS APPROACH: ICD-10-PCS | Performed by: INTERNAL MEDICINE

## 2021-01-01 PROCEDURE — 86901 BLOOD TYPING SEROLOGIC RH(D): CPT

## 2021-01-01 PROCEDURE — 99222 1ST HOSP IP/OBS MODERATE 55: CPT | Performed by: INTERNAL MEDICINE

## 2021-01-01 PROCEDURE — 95886 MUSC TEST DONE W/N TEST COMP: CPT | Performed by: PSYCHIATRY & NEUROLOGY

## 2021-01-01 PROCEDURE — 93005 ELECTROCARDIOGRAM TRACING: CPT

## 2021-01-01 PROCEDURE — 93456 R HRT CORONARY ARTERY ANGIO: CPT | Performed by: INTERNAL MEDICINE

## 2021-01-01 PROCEDURE — 87077 CULTURE AEROBIC IDENTIFY: CPT

## 2021-01-01 PROCEDURE — 87150 DNA/RNA AMPLIFIED PROBE: CPT

## 2021-01-01 PROCEDURE — 05H633Z INSERTION OF INFUSION DEVICE INTO LEFT SUBCLAVIAN VEIN, PERCUTANEOUS APPROACH: ICD-10-PCS | Performed by: SURGERY

## 2021-01-01 PROCEDURE — 92928 PRQ TCAT PLMT NTRAC ST 1 LES: CPT | Performed by: INTERNAL MEDICINE

## 2021-01-01 PROCEDURE — 90935 HEMODIALYSIS ONE EVALUATION: CPT | Performed by: INTERNAL MEDICINE

## 2021-01-01 PROCEDURE — 2709999900 HC NON-CHARGEABLE SUPPLY

## 2021-01-01 PROCEDURE — 93460 R&L HRT ART/VENTRICLE ANGIO: CPT | Performed by: INTERNAL MEDICINE

## 2021-01-01 PROCEDURE — 027F3ZZ DILATION OF AORTIC VALVE, PERCUTANEOUS APPROACH: ICD-10-PCS | Performed by: INTERNAL MEDICINE

## 2021-01-01 PROCEDURE — 1123F ACP DISCUSS/DSCN MKR DOCD: CPT | Performed by: INTERNAL MEDICINE

## 2021-01-01 PROCEDURE — 99221 1ST HOSP IP/OBS SF/LOW 40: CPT | Performed by: SURGERY

## 2021-01-01 PROCEDURE — 02WAXRZ REVISION OF SHORT-TERM EXTERNAL HEART ASSIST SYSTEM IN HEART, EXTERNAL APPROACH: ICD-10-PCS | Performed by: INTERNAL MEDICINE

## 2021-01-01 PROCEDURE — 99291 CRITICAL CARE FIRST HOUR: CPT | Performed by: SURGERY

## 2021-01-01 PROCEDURE — C1887 CATHETER, GUIDING: HCPCS

## 2021-01-01 PROCEDURE — G8427 DOCREV CUR MEDS BY ELIG CLIN: HCPCS | Performed by: NUCLEAR MEDICINE

## 2021-01-01 PROCEDURE — P9017 PLASMA 1 DONOR FRZ W/IN 8 HR: HCPCS

## 2021-01-01 PROCEDURE — 027034Z DILATION OF CORONARY ARTERY, ONE ARTERY WITH DRUG-ELUTING INTRALUMINAL DEVICE, PERCUTANEOUS APPROACH: ICD-10-PCS | Performed by: INTERNAL MEDICINE

## 2021-01-01 PROCEDURE — 99214 OFFICE O/P EST MOD 30 MIN: CPT | Performed by: NUCLEAR MEDICINE

## 2021-01-01 PROCEDURE — 86900 BLOOD TYPING SEROLOGIC ABO: CPT

## 2021-01-01 PROCEDURE — 96374 THER/PROPH/DIAG INJ IV PUSH: CPT

## 2021-01-01 PROCEDURE — 33992 RMVL PERQ LEFT HEART VAD: CPT | Performed by: INTERNAL MEDICINE

## 2021-01-01 PROCEDURE — 95908 NRV CNDJ TST 3-4 STUDIES: CPT | Performed by: PSYCHIATRY & NEUROLOGY

## 2021-01-01 PROCEDURE — 81001 URINALYSIS AUTO W/SCOPE: CPT

## 2021-01-01 PROCEDURE — 1036F TOBACCO NON-USER: CPT | Performed by: INTERNAL MEDICINE

## 2021-01-01 PROCEDURE — 99223 1ST HOSP IP/OBS HIGH 75: CPT | Performed by: FAMILY MEDICINE

## 2021-01-01 PROCEDURE — 87186 SC STD MICRODIL/AGAR DIL: CPT

## 2021-01-01 PROCEDURE — 2140000000 HC CCU INTERMEDIATE R&B

## 2021-01-01 PROCEDURE — 02HA3RZ INSERTION OF SHORT-TERM EXTERNAL HEART ASSIST SYSTEM INTO HEART, PERCUTANEOUS APPROACH: ICD-10-PCS | Performed by: INTERNAL MEDICINE

## 2021-01-01 PROCEDURE — 36556 INSERT NON-TUNNEL CV CATH: CPT | Performed by: INTERNAL MEDICINE

## 2021-01-01 PROCEDURE — 94640 AIRWAY INHALATION TREATMENT: CPT

## 2021-01-01 PROCEDURE — 5A12012 PERFORMANCE OF CARDIAC OUTPUT, SINGLE, MANUAL: ICD-10-PCS | Performed by: INTERNAL MEDICINE

## 2021-01-01 PROCEDURE — P9016 RBC LEUKOCYTES REDUCED: HCPCS

## 2021-01-01 PROCEDURE — B246ZZ4 ULTRASONOGRAPHY OF RIGHT AND LEFT HEART, TRANSESOPHAGEAL: ICD-10-PCS | Performed by: INTERNAL MEDICINE

## 2021-01-01 PROCEDURE — 94002 VENT MGMT INPAT INIT DAY: CPT

## 2021-01-01 PROCEDURE — 99223 1ST HOSP IP/OBS HIGH 75: CPT | Performed by: NUCLEAR MEDICINE

## 2021-01-01 PROCEDURE — 93005 ELECTROCARDIOGRAM TRACING: CPT | Performed by: FAMILY MEDICINE

## 2021-01-01 PROCEDURE — 87541 LEGION PNEUMO DNA AMP PROB: CPT

## 2021-01-01 PROCEDURE — 4040F PNEUMOC VAC/ADMIN/RCVD: CPT | Performed by: PHYSICIAN ASSISTANT

## 2021-01-01 PROCEDURE — C1894 INTRO/SHEATH, NON-LASER: HCPCS

## 2021-01-01 RX ORDER — HEPARIN SODIUM 10000 [USP'U]/100ML
5-30 INJECTION, SOLUTION INTRAVENOUS CONTINUOUS
Status: DISCONTINUED | OUTPATIENT
Start: 2021-01-01 | End: 2021-01-01

## 2021-01-01 RX ORDER — FUROSEMIDE 20 MG/1
20 TABLET ORAL DAILY
Qty: 90 TABLET | Refills: 1 | Status: SHIPPED | OUTPATIENT
Start: 2021-01-01 | End: 2021-01-01

## 2021-01-01 RX ORDER — SPIRONOLACTONE 25 MG/1
25 TABLET ORAL DAILY
Status: DISCONTINUED | OUTPATIENT
Start: 2021-01-01 | End: 2021-01-01

## 2021-01-01 RX ORDER — SODIUM CHLORIDE 9 MG/ML
25 INJECTION, SOLUTION INTRAVENOUS PRN
Status: DISCONTINUED | OUTPATIENT
Start: 2021-01-01 | End: 2021-01-01 | Stop reason: SDUPTHER

## 2021-01-01 RX ORDER — CHLORHEXIDINE GLUCONATE 0.12 MG/ML
15 RINSE ORAL 2 TIMES DAILY
Status: DISCONTINUED | OUTPATIENT
Start: 2021-01-01 | End: 2021-01-01 | Stop reason: SDUPTHER

## 2021-01-01 RX ORDER — ALBUMIN (HUMAN) 12.5 G/50ML
25 SOLUTION INTRAVENOUS EVERY 6 HOURS
Status: DISCONTINUED | OUTPATIENT
Start: 2021-01-01 | End: 2021-01-01 | Stop reason: HOSPADM

## 2021-01-01 RX ORDER — ALBUMIN (HUMAN) 12.5 G/50ML
25 SOLUTION INTRAVENOUS ONCE
Status: COMPLETED | OUTPATIENT
Start: 2021-01-01 | End: 2021-01-01

## 2021-01-01 RX ORDER — CHLORHEXIDINE GLUCONATE 0.12 MG/ML
15 RINSE ORAL 2 TIMES DAILY
Status: DISCONTINUED | OUTPATIENT
Start: 2021-01-01 | End: 2021-01-01 | Stop reason: HOSPADM

## 2021-01-01 RX ORDER — FUROSEMIDE 20 MG/1
TABLET ORAL
Qty: 60 TABLET | Refills: 3
Start: 2021-01-01

## 2021-01-01 RX ORDER — MIDAZOLAM HYDROCHLORIDE 1 MG/ML
INJECTION INTRAMUSCULAR; INTRAVENOUS
Status: COMPLETED
Start: 2021-01-01 | End: 2021-01-01

## 2021-01-01 RX ORDER — SODIUM CHLORIDE 0.9 % (FLUSH) 0.9 %
5-40 SYRINGE (ML) INJECTION EVERY 12 HOURS SCHEDULED
Status: CANCELLED | OUTPATIENT
Start: 2021-01-01

## 2021-01-01 RX ORDER — FUROSEMIDE 10 MG/ML
80 INJECTION INTRAMUSCULAR; INTRAVENOUS ONCE
Status: COMPLETED | OUTPATIENT
Start: 2021-01-01 | End: 2021-01-01

## 2021-01-01 RX ORDER — HEPARIN SODIUM 1000 [USP'U]/ML
80 INJECTION, SOLUTION INTRAVENOUS; SUBCUTANEOUS ONCE
Status: COMPLETED | OUTPATIENT
Start: 2021-01-01 | End: 2021-01-01

## 2021-01-01 RX ORDER — DILTIAZEM HYDROCHLORIDE 5 MG/ML
10 INJECTION INTRAVENOUS ONCE
Status: COMPLETED | OUTPATIENT
Start: 2021-01-01 | End: 2021-01-01

## 2021-01-01 RX ORDER — NOREPINEPHRINE BIT/0.9 % NACL 16MG/250ML
2-100 INFUSION BOTTLE (ML) INTRAVENOUS CONTINUOUS
Status: DISCONTINUED | OUTPATIENT
Start: 2021-01-01 | End: 2021-01-01 | Stop reason: SDUPTHER

## 2021-01-01 RX ORDER — SODIUM CHLORIDE 0.9 % (FLUSH) 0.9 %
5-40 SYRINGE (ML) INJECTION PRN
Status: CANCELLED | OUTPATIENT
Start: 2021-01-01

## 2021-01-01 RX ORDER — HEPARIN SODIUM 10000 [USP'U]/100ML
5-30 INJECTION, SOLUTION INTRAVENOUS CONTINUOUS
Status: DISCONTINUED | OUTPATIENT
Start: 2021-01-01 | End: 2021-01-01 | Stop reason: ALTCHOICE

## 2021-01-01 RX ORDER — FLUTICASONE PROPIONATE 50 MCG
2 SPRAY, SUSPENSION (ML) NASAL DAILY
Status: DISCONTINUED | OUTPATIENT
Start: 2021-01-01 | End: 2021-01-01 | Stop reason: HOSPADM

## 2021-01-01 RX ORDER — SODIUM CHLORIDE 9 MG/ML
25 INJECTION, SOLUTION INTRAVENOUS PRN
Status: CANCELLED | OUTPATIENT
Start: 2021-01-01

## 2021-01-01 RX ORDER — SODIUM CHLORIDE 0.9 % (FLUSH) 0.9 %
5-40 SYRINGE (ML) INJECTION EVERY 12 HOURS SCHEDULED
Status: DISCONTINUED | OUTPATIENT
Start: 2021-01-01 | End: 2021-01-01 | Stop reason: SDUPTHER

## 2021-01-01 RX ORDER — POTASSIUM CHLORIDE 29.8 MG/ML
20 INJECTION INTRAVENOUS PRN
Status: DISCONTINUED | OUTPATIENT
Start: 2021-01-01 | End: 2021-01-01 | Stop reason: HOSPADM

## 2021-01-01 RX ORDER — DOBUTAMINE HYDROCHLORIDE 200 MG/100ML
10 INJECTION INTRAVENOUS CONTINUOUS
Status: DISCONTINUED | OUTPATIENT
Start: 2021-01-01 | End: 2021-01-01 | Stop reason: HOSPADM

## 2021-01-01 RX ORDER — SPIRONOLACTONE 25 MG/1
25 TABLET ORAL DAILY
Qty: 45 TABLET | Refills: 3
Start: 2021-01-01

## 2021-01-01 RX ORDER — NITROGLYCERIN 0.4 MG/1
0.4 TABLET SUBLINGUAL EVERY 5 MIN PRN
Status: DISCONTINUED | OUTPATIENT
Start: 2021-01-01 | End: 2021-01-01 | Stop reason: HOSPADM

## 2021-01-01 RX ORDER — SODIUM CHLORIDE 9 MG/ML
INJECTION, SOLUTION INTRAVENOUS CONTINUOUS
Status: DISCONTINUED | OUTPATIENT
Start: 2021-01-01 | End: 2021-01-01

## 2021-01-01 RX ORDER — MIDODRINE HYDROCHLORIDE 5 MG/1
5 TABLET ORAL
Status: DISCONTINUED | OUTPATIENT
Start: 2021-01-01 | End: 2021-01-01

## 2021-01-01 RX ORDER — DILTIAZEM HYDROCHLORIDE 5 MG/ML
INJECTION INTRAVENOUS
Status: COMPLETED
Start: 2021-01-01 | End: 2021-01-01

## 2021-01-01 RX ORDER — 0.9 % SODIUM CHLORIDE 0.9 %
250 INTRAVENOUS SOLUTION INTRAVENOUS ONCE
Status: COMPLETED | OUTPATIENT
Start: 2021-01-01 | End: 2021-01-01

## 2021-01-01 RX ORDER — LIDOCAINE 4 G/G
1 PATCH TOPICAL ONCE
Status: COMPLETED | OUTPATIENT
Start: 2021-01-01 | End: 2021-01-01

## 2021-01-01 RX ORDER — NOREPINEPHRINE BIT/0.9 % NACL 16MG/250ML
INFUSION BOTTLE (ML) INTRAVENOUS
Status: COMPLETED
Start: 2021-01-01 | End: 2021-01-01

## 2021-01-01 RX ORDER — CALCIUM CHLORIDE, MAGNESIUM CHLORIDE, DEXTROSE MONOHYDRATE, LACTIC ACID, SODIUM CHLORIDE, SODIUM BICARBONATE AND POTASSIUM CHLORIDE 5.15; 2.03; 22; 5.4; 6.46; 3.09; .157 G/L; G/L; G/L; G/L; G/L; G/L; G/L
INJECTION INTRAVENOUS CONTINUOUS
Status: DISCONTINUED | OUTPATIENT
Start: 2021-01-01 | End: 2021-01-01 | Stop reason: HOSPADM

## 2021-01-01 RX ORDER — SODIUM CHLORIDE 9 MG/ML
INJECTION, SOLUTION INTRAVENOUS CONTINUOUS
Status: DISCONTINUED | OUTPATIENT
Start: 2021-01-01 | End: 2021-01-01 | Stop reason: HOSPADM

## 2021-01-01 RX ORDER — EPINEPHRINE 0.1 MG/ML
SYRINGE (ML) INJECTION
Status: COMPLETED | OUTPATIENT
Start: 2021-01-01 | End: 2021-01-01

## 2021-01-01 RX ORDER — DIPHENHYDRAMINE HYDROCHLORIDE 50 MG/ML
50 INJECTION INTRAMUSCULAR; INTRAVENOUS ONCE
Status: COMPLETED | OUTPATIENT
Start: 2021-01-01 | End: 2021-01-01

## 2021-01-01 RX ORDER — SODIUM CHLORIDE 0.9 % (FLUSH) 0.9 %
10 SYRINGE (ML) INJECTION EVERY 12 HOURS SCHEDULED
Status: DISCONTINUED | OUTPATIENT
Start: 2021-01-01 | End: 2021-01-01 | Stop reason: HOSPADM

## 2021-01-01 RX ORDER — HEPARIN SODIUM 1000 [USP'U]/ML
40 INJECTION, SOLUTION INTRAVENOUS; SUBCUTANEOUS PRN
Status: DISCONTINUED | OUTPATIENT
Start: 2021-01-01 | End: 2021-01-01 | Stop reason: ALTCHOICE

## 2021-01-01 RX ORDER — ASPIRIN 325 MG
325 TABLET ORAL ONCE
Status: COMPLETED | OUTPATIENT
Start: 2021-01-01 | End: 2021-01-01

## 2021-01-01 RX ORDER — LANOLIN ALCOHOL/MO/W.PET/CERES
5 CREAM (GRAM) TOPICAL NIGHTLY PRN
Status: DISCONTINUED | OUTPATIENT
Start: 2021-01-01 | End: 2021-01-01 | Stop reason: HOSPADM

## 2021-01-01 RX ORDER — FENTANYL CITRATE 50 UG/ML
25 INJECTION, SOLUTION INTRAMUSCULAR; INTRAVENOUS
Status: DISCONTINUED | OUTPATIENT
Start: 2021-01-01 | End: 2021-01-01 | Stop reason: HOSPADM

## 2021-01-01 RX ORDER — LIDOCAINE 4 G/G
1 PATCH TOPICAL DAILY
Status: DISCONTINUED | OUTPATIENT
Start: 2021-01-01 | End: 2021-01-01

## 2021-01-01 RX ORDER — HEPARIN SODIUM 1000 [USP'U]/ML
80 INJECTION, SOLUTION INTRAVENOUS; SUBCUTANEOUS PRN
Status: DISCONTINUED | OUTPATIENT
Start: 2021-01-01 | End: 2021-01-01 | Stop reason: ALTCHOICE

## 2021-01-01 RX ORDER — SODIUM CHLORIDE 9 MG/ML
INJECTION, SOLUTION INTRAVENOUS PRN
Status: DISCONTINUED | OUTPATIENT
Start: 2021-01-01 | End: 2021-01-01 | Stop reason: SDUPTHER

## 2021-01-01 RX ORDER — FUROSEMIDE 20 MG/1
40 TABLET ORAL 2 TIMES DAILY
Qty: 60 TABLET | Refills: 3
Start: 2021-01-01 | End: 2021-01-01

## 2021-01-01 RX ORDER — ASPIRIN 325 MG
325 TABLET ORAL ONCE
Status: CANCELLED | OUTPATIENT
Start: 2021-01-01 | End: 2021-01-01

## 2021-01-01 RX ORDER — DOBUTAMINE HYDROCHLORIDE 200 MG/100ML
INJECTION INTRAVENOUS
Status: COMPLETED
Start: 2021-01-01 | End: 2021-01-01

## 2021-01-01 RX ORDER — SPIRONOLACTONE 25 MG/1
12.5 TABLET ORAL DAILY
Qty: 45 TABLET | Refills: 3 | Status: SHIPPED | OUTPATIENT
Start: 2021-01-01 | End: 2021-01-01

## 2021-01-01 RX ORDER — LISINOPRIL 2.5 MG/1
2.5 TABLET ORAL DAILY
COMMUNITY
End: 2021-01-01 | Stop reason: SDUPTHER

## 2021-01-01 RX ORDER — FUROSEMIDE 20 MG/1
20 TABLET ORAL DAILY
Status: DISCONTINUED | OUTPATIENT
Start: 2021-01-01 | End: 2021-01-01

## 2021-01-01 RX ORDER — SODIUM CHLORIDE 9 MG/ML
INJECTION, SOLUTION INTRAVENOUS PRN
Status: DISCONTINUED | OUTPATIENT
Start: 2021-01-01 | End: 2021-01-01

## 2021-01-01 RX ORDER — SODIUM CHLORIDE 9 MG/ML
INJECTION, SOLUTION INTRAVENOUS PRN
Status: DISCONTINUED | OUTPATIENT
Start: 2021-01-01 | End: 2021-01-01 | Stop reason: HOSPADM

## 2021-01-01 RX ORDER — OMEPRAZOLE 20 MG/1
20 CAPSULE, DELAYED RELEASE ORAL
Status: DISCONTINUED | OUTPATIENT
Start: 2021-01-01 | End: 2021-01-01 | Stop reason: CLARIF

## 2021-01-01 RX ORDER — PANTOPRAZOLE SODIUM 40 MG/1
40 TABLET, DELAYED RELEASE ORAL
Status: DISCONTINUED | OUTPATIENT
Start: 2021-01-01 | End: 2021-01-01

## 2021-01-01 RX ORDER — KETAMINE HCL IN NACL, ISO-OSM 100MG/10ML
SYRINGE (ML) INJECTION
Status: COMPLETED
Start: 2021-01-01 | End: 2021-01-01

## 2021-01-01 RX ORDER — LACTULOSE 10 G/15ML
20 SOLUTION ORAL 3 TIMES DAILY
Status: DISCONTINUED | OUTPATIENT
Start: 2021-01-01 | End: 2021-01-01

## 2021-01-01 RX ORDER — LISINOPRIL 2.5 MG/1
2.5 TABLET ORAL DAILY
Status: DISCONTINUED | OUTPATIENT
Start: 2021-01-01 | End: 2021-01-01

## 2021-01-01 RX ORDER — ACETAMINOPHEN 650 MG/1
650 SUPPOSITORY RECTAL EVERY 6 HOURS PRN
Status: DISCONTINUED | OUTPATIENT
Start: 2021-01-01 | End: 2021-01-01 | Stop reason: HOSPADM

## 2021-01-01 RX ORDER — FUROSEMIDE 20 MG/1
20 TABLET ORAL DAILY
COMMUNITY
End: 2021-01-01 | Stop reason: SDUPTHER

## 2021-01-01 RX ORDER — DEXMEDETOMIDINE HYDROCHLORIDE 4 UG/ML
.2-1.4 INJECTION, SOLUTION INTRAVENOUS CONTINUOUS
Status: DISCONTINUED | OUTPATIENT
Start: 2021-01-01 | End: 2021-01-01

## 2021-01-01 RX ORDER — SODIUM CHLORIDE 0.9 % (FLUSH) 0.9 %
10 SYRINGE (ML) INJECTION PRN
Status: DISCONTINUED | OUTPATIENT
Start: 2021-01-01 | End: 2021-01-01 | Stop reason: HOSPADM

## 2021-01-01 RX ORDER — DIPHENHYDRAMINE HYDROCHLORIDE 50 MG/ML
50 INJECTION INTRAMUSCULAR; INTRAVENOUS ONCE
Status: CANCELLED | OUTPATIENT
Start: 2021-01-01 | End: 2021-01-01

## 2021-01-01 RX ORDER — LISINOPRIL 2.5 MG/1
2.5 TABLET ORAL DAILY
Qty: 90 TABLET | Refills: 1 | Status: SHIPPED | OUTPATIENT
Start: 2021-01-01

## 2021-01-01 RX ORDER — HEPARIN SODIUM 1000 [USP'U]/ML
4000 INJECTION, SOLUTION INTRAVENOUS; SUBCUTANEOUS PRN
Status: DISCONTINUED | OUTPATIENT
Start: 2021-01-01 | End: 2021-01-01

## 2021-01-01 RX ORDER — LIDOCAINE 4 G/G
PATCH TOPICAL
Status: DISCONTINUED
Start: 2021-01-01 | End: 2021-01-01

## 2021-01-01 RX ORDER — DILTIAZEM HYDROCHLORIDE 5 MG/ML
5 INJECTION INTRAVENOUS ONCE
Status: COMPLETED | OUTPATIENT
Start: 2021-01-01 | End: 2021-01-01

## 2021-01-01 RX ORDER — HEPARIN SODIUM 1000 [USP'U]/ML
2000 INJECTION, SOLUTION INTRAVENOUS; SUBCUTANEOUS PRN
Status: DISCONTINUED | OUTPATIENT
Start: 2021-01-01 | End: 2021-01-01

## 2021-01-01 RX ORDER — FUROSEMIDE 10 MG/ML
20 INJECTION INTRAMUSCULAR; INTRAVENOUS DAILY
Status: DISCONTINUED | OUTPATIENT
Start: 2021-01-01 | End: 2021-01-01

## 2021-01-01 RX ORDER — NITROGLYCERIN 0.4 MG/1
0.4 TABLET SUBLINGUAL EVERY 5 MIN PRN
Status: CANCELLED | OUTPATIENT
Start: 2021-01-01

## 2021-01-01 RX ORDER — FUROSEMIDE 20 MG/1
20 TABLET ORAL 2 TIMES DAILY
Qty: 60 TABLET | Refills: 3 | Status: SHIPPED | OUTPATIENT
Start: 2021-01-01 | End: 2021-01-01

## 2021-01-01 RX ORDER — MIDODRINE HYDROCHLORIDE 5 MG/1
10 TABLET ORAL ONCE
Status: COMPLETED | OUTPATIENT
Start: 2021-01-01 | End: 2021-01-01

## 2021-01-01 RX ORDER — MIDODRINE HYDROCHLORIDE 10 MG/1
10 TABLET ORAL 2 TIMES DAILY WITH MEALS
Status: DISCONTINUED | OUTPATIENT
Start: 2021-01-01 | End: 2021-01-01 | Stop reason: HOSPADM

## 2021-01-01 RX ORDER — ACETAMINOPHEN 325 MG/1
650 TABLET ORAL EVERY 4 HOURS PRN
Status: DISCONTINUED | OUTPATIENT
Start: 2021-01-01 | End: 2021-01-01 | Stop reason: HOSPADM

## 2021-01-01 RX ORDER — HYDROXYZINE HYDROCHLORIDE 25 MG/1
25 TABLET, FILM COATED ORAL 3 TIMES DAILY PRN
Status: DISCONTINUED | OUTPATIENT
Start: 2021-01-01 | End: 2021-01-01 | Stop reason: HOSPADM

## 2021-01-01 RX ORDER — 0.9 % SODIUM CHLORIDE 0.9 %
500 INTRAVENOUS SOLUTION INTRAVENOUS ONCE
Status: COMPLETED | OUTPATIENT
Start: 2021-01-01 | End: 2021-01-01

## 2021-01-01 RX ORDER — ASPIRIN 81 MG/1
81 TABLET ORAL DAILY
Status: DISCONTINUED | OUTPATIENT
Start: 2021-01-01 | End: 2021-01-01 | Stop reason: HOSPADM

## 2021-01-01 RX ORDER — FENTANYL CITRATE 50 UG/ML
INJECTION, SOLUTION INTRAMUSCULAR; INTRAVENOUS
Status: COMPLETED
Start: 2021-01-01 | End: 2021-01-01

## 2021-01-01 RX ORDER — MIDAZOLAM HYDROCHLORIDE 1 MG/ML
2 INJECTION INTRAMUSCULAR; INTRAVENOUS ONCE
Status: COMPLETED | OUTPATIENT
Start: 2021-01-01 | End: 2021-01-01

## 2021-01-01 RX ORDER — FUROSEMIDE 20 MG/1
40 TABLET ORAL DAILY
Qty: 60 TABLET | Refills: 3
Start: 2021-01-01 | End: 2021-01-01

## 2021-01-01 RX ORDER — POTASSIUM CHLORIDE 20 MEQ/1
40 TABLET, EXTENDED RELEASE ORAL ONCE
Status: COMPLETED | OUTPATIENT
Start: 2021-01-01 | End: 2021-01-01

## 2021-01-01 RX ORDER — MIDODRINE HYDROCHLORIDE 10 MG/1
10 TABLET ORAL
Status: DISCONTINUED | OUTPATIENT
Start: 2021-01-01 | End: 2021-01-01

## 2021-01-01 RX ORDER — SODIUM CHLORIDE 9 MG/ML
25 INJECTION, SOLUTION INTRAVENOUS PRN
Status: DISCONTINUED | OUTPATIENT
Start: 2021-01-01 | End: 2021-01-01

## 2021-01-01 RX ORDER — SODIUM CHLORIDE 9 MG/ML
INJECTION, SOLUTION INTRAVENOUS CONTINUOUS
Status: CANCELLED | OUTPATIENT
Start: 2021-01-01

## 2021-01-01 RX ORDER — SODIUM CHLORIDE 0.9 % (FLUSH) 0.9 %
5-40 SYRINGE (ML) INJECTION PRN
Status: DISCONTINUED | OUTPATIENT
Start: 2021-01-01 | End: 2021-01-01 | Stop reason: SDUPTHER

## 2021-01-01 RX ORDER — GUAIFENESIN 600 MG/1
600 TABLET, EXTENDED RELEASE ORAL 2 TIMES DAILY
Status: DISCONTINUED | OUTPATIENT
Start: 2021-01-01 | End: 2021-01-01 | Stop reason: HOSPADM

## 2021-01-01 RX ORDER — ACETAMINOPHEN 325 MG/1
650 TABLET ORAL ONCE
Status: COMPLETED | OUTPATIENT
Start: 2021-01-01 | End: 2021-01-01

## 2021-01-01 RX ORDER — ACETAMINOPHEN 325 MG/1
650 TABLET ORAL EVERY 6 HOURS PRN
Status: DISCONTINUED | OUTPATIENT
Start: 2021-01-01 | End: 2021-01-01 | Stop reason: ALTCHOICE

## 2021-01-01 RX ORDER — 0.9 % SODIUM CHLORIDE 0.9 %
1000 INTRAVENOUS SOLUTION INTRAVENOUS
Status: ACTIVE | OUTPATIENT
Start: 2021-01-01 | End: 2021-01-01

## 2021-01-01 RX ORDER — CALCIUM ACETATE 667 MG/1
1334 TABLET ORAL
Status: DISCONTINUED | OUTPATIENT
Start: 2021-01-01 | End: 2021-01-01 | Stop reason: HOSPADM

## 2021-01-01 RX ORDER — CALCIUM ACETATE 667 MG/1
1334 TABLET ORAL
Status: DISCONTINUED | OUTPATIENT
Start: 2021-01-01 | End: 2021-01-01

## 2021-01-01 RX ADMIN — CALCIUM CHLORIDE, MAGNESIUM CHLORIDE, DEXTROSE MONOHYDRATE, LACTIC ACID, SODIUM CHLORIDE, SODIUM BICARBONATE AND POTASSIUM CHLORIDE 1000 ML/HR: 5.15; 2.03; 22; 5.4; 6.46; 3.09; .157 INJECTION INTRAVENOUS at 01:31

## 2021-01-01 RX ADMIN — FAMOTIDINE 20 MG: 10 INJECTION, SOLUTION INTRAVENOUS at 08:22

## 2021-01-01 RX ADMIN — Medication 1 MG: at 08:40

## 2021-01-01 RX ADMIN — HEPARIN SODIUM AND DEXTROSE 15 UNITS/KG/HR: 10000; 5 INJECTION INTRAVENOUS at 07:48

## 2021-01-01 RX ADMIN — CALCIUM CHLORIDE, MAGNESIUM CHLORIDE, DEXTROSE MONOHYDRATE, LACTIC ACID, SODIUM CHLORIDE, SODIUM BICARBONATE AND POTASSIUM CHLORIDE 1000 ML/HR: 5.15; 2.03; 22; 5.4; 6.46; 3.09; .157 INJECTION INTRAVENOUS at 01:34

## 2021-01-01 RX ADMIN — ALBUMIN (HUMAN) 25 G: 0.25 INJECTION, SOLUTION INTRAVENOUS at 10:49

## 2021-01-01 RX ADMIN — PIPERACILLIN AND TAZOBACTAM 2250 MG: 2; .25 INJECTION, POWDER, LYOPHILIZED, FOR SOLUTION INTRAVENOUS at 10:52

## 2021-01-01 RX ADMIN — ASPIRIN 81 MG: 81 TABLET, COATED ORAL at 07:54

## 2021-01-01 RX ADMIN — AMIODARONE HYDROCHLORIDE 1 MG/MIN: 1.8 INJECTION, SOLUTION INTRAVENOUS at 20:52

## 2021-01-01 RX ADMIN — BUMETANIDE 2 MG/HR: 0.25 INJECTION INTRAMUSCULAR; INTRAVENOUS at 21:48

## 2021-01-01 RX ADMIN — PHENYLEPHRINE HYDROCHLORIDE 100 MCG/MIN: 10 INJECTION INTRAVENOUS at 23:02

## 2021-01-01 RX ADMIN — Medication 1334 MG: at 08:25

## 2021-01-01 RX ADMIN — TICAGRELOR 90 MG: 90 TABLET ORAL at 08:44

## 2021-01-01 RX ADMIN — MIDAZOLAM 2 MG/HR: 5 INJECTION INTRAMUSCULAR; INTRAVENOUS at 10:48

## 2021-01-01 RX ADMIN — TICAGRELOR 90 MG: 90 TABLET ORAL at 22:04

## 2021-01-01 RX ADMIN — ALBUMIN (HUMAN) 25 G: 0.25 INJECTION, SOLUTION INTRAVENOUS at 16:58

## 2021-01-01 RX ADMIN — BUMETANIDE 2 MG/HR: 0.25 INJECTION INTRAMUSCULAR; INTRAVENOUS at 04:16

## 2021-01-01 RX ADMIN — ASPIRIN 325 MG: 325 TABLET, FILM COATED ORAL at 16:56

## 2021-01-01 RX ADMIN — CALCIUM CHLORIDE, MAGNESIUM CHLORIDE, DEXTROSE MONOHYDRATE, LACTIC ACID, SODIUM CHLORIDE, SODIUM BICARBONATE AND POTASSIUM CHLORIDE 1000 ML/HR: 5.15; 2.03; 22; 5.4; 6.46; 3.09; .157 INJECTION INTRAVENOUS at 20:27

## 2021-01-01 RX ADMIN — Medication 1334 MG: at 12:58

## 2021-01-01 RX ADMIN — LACTULOSE 20 G: 20 SOLUTION ORAL at 13:01

## 2021-01-01 RX ADMIN — LACTULOSE 20 G: 20 SOLUTION ORAL at 10:48

## 2021-01-01 RX ADMIN — Medication 15 ML: at 08:22

## 2021-01-01 RX ADMIN — Medication 25 MCG/MIN: at 16:43

## 2021-01-01 RX ADMIN — DOBUTAMINE HYDROCHLORIDE 10 MCG/KG/MIN: 200 INJECTION INTRAVENOUS at 01:55

## 2021-01-01 RX ADMIN — SODIUM CHLORIDE 0.7 MCG/KG/HR: 9 INJECTION, SOLUTION INTRAVENOUS at 16:45

## 2021-01-01 RX ADMIN — HYDROXYZINE HYDROCHLORIDE 25 MG: 25 TABLET ORAL at 02:24

## 2021-01-01 RX ADMIN — DILTIAZEM HYDROCHLORIDE 5 MG: 5 INJECTION INTRAVENOUS at 18:26

## 2021-01-01 RX ADMIN — IPRATROPIUM BROMIDE 0.5 MG: 0.5 SOLUTION RESPIRATORY (INHALATION) at 01:32

## 2021-01-01 RX ADMIN — PIPERACILLIN AND TAZOBACTAM 3375 MG: 3; .375 INJECTION, POWDER, LYOPHILIZED, FOR SOLUTION INTRAVENOUS at 02:11

## 2021-01-01 RX ADMIN — SODIUM CHLORIDE, PRESERVATIVE FREE 10 ML: 5 INJECTION INTRAVENOUS at 08:26

## 2021-01-01 RX ADMIN — DILTIAZEM HYDROCHLORIDE 5 MG: 5 INJECTION INTRAVENOUS at 18:32

## 2021-01-01 RX ADMIN — HEPARIN SODIUM: 10000 INJECTION, SOLUTION INTRAVENOUS; SUBCUTANEOUS at 03:05

## 2021-01-01 RX ADMIN — PIPERACILLIN AND TAZOBACTAM 2250 MG: 2; .25 INJECTION, POWDER, LYOPHILIZED, FOR SOLUTION INTRAVENOUS at 16:59

## 2021-01-01 RX ADMIN — SODIUM BICARBONATE 150 MEQ: 84 INJECTION, SOLUTION INTRAVENOUS at 08:06

## 2021-01-01 RX ADMIN — Medication 4.5 MG: at 02:24

## 2021-01-01 RX ADMIN — TICAGRELOR 90 MG: 90 TABLET ORAL at 08:22

## 2021-01-01 RX ADMIN — Medication 15 ML: at 20:48

## 2021-01-01 RX ADMIN — IOPAMIDOL 65 ML: 755 INJECTION, SOLUTION INTRAVENOUS at 20:51

## 2021-01-01 RX ADMIN — MIDAZOLAM HYDROCHLORIDE 2 MG: 1 INJECTION INTRAMUSCULAR; INTRAVENOUS at 08:40

## 2021-01-01 RX ADMIN — Medication 15 ML: at 22:04

## 2021-01-01 RX ADMIN — VANCOMYCIN HYDROCHLORIDE 1000 MG: 1 INJECTION, POWDER, LYOPHILIZED, FOR SOLUTION INTRAVENOUS at 10:05

## 2021-01-01 RX ADMIN — CALCIUM GLUCONATE 1000 MG: 98 INJECTION, SOLUTION INTRAVENOUS at 12:52

## 2021-01-01 RX ADMIN — SODIUM CHLORIDE, PRESERVATIVE FREE 10 ML: 5 INJECTION INTRAVENOUS at 08:48

## 2021-01-01 RX ADMIN — AMIODARONE HYDROCHLORIDE 0.5 MG/MIN: 1.8 INJECTION, SOLUTION INTRAVENOUS at 13:45

## 2021-01-01 RX ADMIN — BUMETANIDE 2 MG/HR: 0.25 INJECTION INTRAMUSCULAR; INTRAVENOUS at 14:42

## 2021-01-01 RX ADMIN — SODIUM CHLORIDE 30 MCG/MIN: 9 INJECTION, SOLUTION INTRAVENOUS at 19:04

## 2021-01-01 RX ADMIN — Medication 30 MCG/MIN: at 01:26

## 2021-01-01 RX ADMIN — Medication 15 ML: at 07:55

## 2021-01-01 RX ADMIN — Medication 1334 MG: at 07:54

## 2021-01-01 RX ADMIN — SODIUM CHLORIDE, PRESERVATIVE FREE 10 ML: 5 INJECTION INTRAVENOUS at 20:49

## 2021-01-01 RX ADMIN — MIDAZOLAM 2 MG: 1 INJECTION INTRAMUSCULAR; INTRAVENOUS at 15:13

## 2021-01-01 RX ADMIN — Medication 1334 MG: at 17:25

## 2021-01-01 RX ADMIN — Medication 1334 MG: at 16:58

## 2021-01-01 RX ADMIN — PIPERACILLIN AND TAZOBACTAM 2250 MG: 2; .25 INJECTION, POWDER, LYOPHILIZED, FOR SOLUTION INTRAVENOUS at 05:30

## 2021-01-01 RX ADMIN — Medication 100 MG: at 15:03

## 2021-01-01 RX ADMIN — CHLOROTHIAZIDE SODIUM 500 MG: 500 INJECTION, POWDER, LYOPHILIZED, FOR SOLUTION INTRAVENOUS at 08:22

## 2021-01-01 RX ADMIN — FAMOTIDINE 20 MG: 10 INJECTION, SOLUTION INTRAVENOUS at 07:55

## 2021-01-01 RX ADMIN — PHENYLEPHRINE HYDROCHLORIDE 175 MCG/MIN: 10 INJECTION INTRAVENOUS at 08:07

## 2021-01-01 RX ADMIN — IOPAMIDOL 125 ML: 755 INJECTION, SOLUTION INTRAVENOUS at 14:09

## 2021-01-01 RX ADMIN — CALCIUM GLUCONATE 1000 MG: 98 INJECTION, SOLUTION INTRAVENOUS at 06:09

## 2021-01-01 RX ADMIN — ALBUMIN (HUMAN) 25 G: 0.25 INJECTION, SOLUTION INTRAVENOUS at 09:48

## 2021-01-01 RX ADMIN — MIDAZOLAM HYDROCHLORIDE 2 MG: 1 INJECTION INTRAMUSCULAR; INTRAVENOUS at 15:13

## 2021-01-01 RX ADMIN — TICAGRELOR 90 MG: 90 TABLET ORAL at 20:48

## 2021-01-01 RX ADMIN — MIDAZOLAM 1 MG: 1 INJECTION INTRAMUSCULAR; INTRAVENOUS at 15:22

## 2021-01-01 RX ADMIN — TICAGRELOR 90 MG: 90 TABLET ORAL at 07:55

## 2021-01-01 RX ADMIN — DOBUTAMINE HYDROCHLORIDE 2.5 MCG/KG/MIN: 200 INJECTION INTRAVENOUS at 21:45

## 2021-01-01 RX ADMIN — FENTANYL CITRATE 50 MCG/HR: 50 INJECTION, SOLUTION INTRAMUSCULAR; INTRAVENOUS at 01:55

## 2021-01-01 RX ADMIN — ASPIRIN 81 MG: 81 TABLET, COATED ORAL at 08:22

## 2021-01-01 RX ADMIN — VASOPRESSIN 0.04 UNITS/MIN: 20 INJECTION INTRAVENOUS at 05:02

## 2021-01-01 RX ADMIN — ALBUMIN (HUMAN) 25 G: 0.25 INJECTION, SOLUTION INTRAVENOUS at 03:43

## 2021-01-01 RX ADMIN — SODIUM CHLORIDE 40 MCG/MIN: 9 INJECTION, SOLUTION INTRAVENOUS at 06:58

## 2021-01-01 RX ADMIN — MIDAZOLAM HYDROCHLORIDE 1 MG: 1 INJECTION INTRAMUSCULAR; INTRAVENOUS at 15:22

## 2021-01-01 RX ADMIN — MIDAZOLAM 3 MG/HR: 5 INJECTION INTRAMUSCULAR; INTRAVENOUS at 19:04

## 2021-01-01 RX ADMIN — SODIUM CHLORIDE, PRESERVATIVE FREE 10 ML: 5 INJECTION INTRAVENOUS at 22:04

## 2021-01-01 RX ADMIN — ALBUMIN (HUMAN) 25 G: 0.25 INJECTION, SOLUTION INTRAVENOUS at 04:16

## 2021-01-01 RX ADMIN — AMIODARONE HYDROCHLORIDE 0.5 MG/MIN: 1.8 INJECTION, SOLUTION INTRAVENOUS at 02:26

## 2021-01-01 RX ADMIN — DOBUTAMINE HYDROCHLORIDE 12 MCG/KG/MIN: 200 INJECTION INTRAVENOUS at 16:58

## 2021-01-01 RX ADMIN — SODIUM CHLORIDE 250 ML: 9 INJECTION, SOLUTION INTRAVENOUS at 09:44

## 2021-01-01 RX ADMIN — CALCIUM CHLORIDE, MAGNESIUM CHLORIDE, DEXTROSE MONOHYDRATE, LACTIC ACID, SODIUM CHLORIDE, SODIUM BICARBONATE AND POTASSIUM CHLORIDE 1000 ML/HR: 5.15; 2.03; 22; 5.4; 6.46; 3.09; .157 INJECTION INTRAVENOUS at 09:46

## 2021-01-01 RX ADMIN — DIPHENHYDRAMINE HYDROCHLORIDE 50 MG: 50 INJECTION, SOLUTION INTRAMUSCULAR; INTRAVENOUS at 16:56

## 2021-01-01 RX ADMIN — ASPIRIN 81 MG: 81 TABLET, COATED ORAL at 08:44

## 2021-01-01 RX ADMIN — HEPARIN SODIUM 4900 UNITS: 1000 INJECTION, SOLUTION INTRAVENOUS; SUBCUTANEOUS at 20:58

## 2021-01-01 RX ADMIN — IPRATROPIUM BROMIDE 0.5 MG: 0.5 SOLUTION RESPIRATORY (INHALATION) at 07:59

## 2021-01-01 RX ADMIN — FLUTICASONE PROPIONATE 2 SPRAY: 50 SPRAY, METERED NASAL at 08:52

## 2021-01-01 RX ADMIN — Medication 1000 ML/HR: at 20:28

## 2021-01-01 RX ADMIN — Medication 35 MCG/MIN: at 15:18

## 2021-01-01 RX ADMIN — BUMETANIDE 1 MG/HR: 0.25 INJECTION INTRAMUSCULAR; INTRAVENOUS at 22:22

## 2021-01-01 RX ADMIN — PHENYLEPHRINE HYDROCHLORIDE 120 MCG/MIN: 10 INJECTION INTRAVENOUS at 07:00

## 2021-01-01 RX ADMIN — CALCIUM GLUCONATE 1000 MG: 98 INJECTION, SOLUTION INTRAVENOUS at 19:49

## 2021-01-01 RX ADMIN — MIDODRINE HYDROCHLORIDE 10 MG: 10 TABLET ORAL at 23:47

## 2021-01-01 RX ADMIN — SODIUM CHLORIDE 0.9 MCG/KG/HR: 9 INJECTION, SOLUTION INTRAVENOUS at 08:07

## 2021-01-01 RX ADMIN — Medication 1000 ML/HR: at 01:37

## 2021-01-01 RX ADMIN — ALBUMIN (HUMAN) 25 G: 0.25 INJECTION, SOLUTION INTRAVENOUS at 22:17

## 2021-01-01 RX ADMIN — Medication 15 ML: at 08:44

## 2021-01-01 RX ADMIN — CALCIUM CHLORIDE, MAGNESIUM CHLORIDE, DEXTROSE MONOHYDRATE, LACTIC ACID, SODIUM CHLORIDE, SODIUM BICARBONATE AND POTASSIUM CHLORIDE 1000 ML/HR: 5.15; 2.03; 22; 5.4; 6.46; 3.09; .157 INJECTION INTRAVENOUS at 16:08

## 2021-01-01 RX ADMIN — TICAGRELOR 180 MG: 90 TABLET ORAL at 21:25

## 2021-01-01 RX ADMIN — Medication 35 MCG/MIN: at 08:08

## 2021-01-01 RX ADMIN — PHENYLEPHRINE HYDROCHLORIDE 125 MCG/MIN: 10 INJECTION INTRAVENOUS at 12:58

## 2021-01-01 RX ADMIN — CALCIUM GLUCONATE 1000 MG: 98 INJECTION, SOLUTION INTRAVENOUS at 17:49

## 2021-01-01 RX ADMIN — CALCIUM GLUCONATE 1000 MG: 98 INJECTION, SOLUTION INTRAVENOUS at 21:49

## 2021-01-01 RX ADMIN — FENTANYL CITRATE 50 MCG/HR: 50 INJECTION, SOLUTION INTRAMUSCULAR; INTRAVENOUS at 03:34

## 2021-01-01 RX ADMIN — PIPERACILLIN AND TAZOBACTAM 3375 MG: 3; .375 INJECTION, POWDER, LYOPHILIZED, FOR SOLUTION INTRAVENOUS at 10:05

## 2021-01-01 RX ADMIN — Medication 1000 ML/HR: at 06:42

## 2021-01-01 RX ADMIN — PANTOPRAZOLE SODIUM 40 MG: 40 TABLET, DELAYED RELEASE ORAL at 06:24

## 2021-01-01 RX ADMIN — Medication 1334 MG: at 08:44

## 2021-01-01 RX ADMIN — ALBUMIN (HUMAN) 25 G: 0.25 INJECTION, SOLUTION INTRAVENOUS at 17:25

## 2021-01-01 RX ADMIN — SODIUM CHLORIDE 500 ML: 9 INJECTION, SOLUTION INTRAVENOUS at 04:35

## 2021-01-01 RX ADMIN — ACETAMINOPHEN 650 MG: 325 TABLET ORAL at 23:09

## 2021-01-01 RX ADMIN — FENTANYL CITRATE 75 MCG/HR: 50 INJECTION, SOLUTION INTRAMUSCULAR; INTRAVENOUS at 23:16

## 2021-01-01 RX ADMIN — PHENYLEPHRINE HYDROCHLORIDE 25 MCG/MIN: 10 INJECTION INTRAVENOUS at 02:00

## 2021-01-01 RX ADMIN — DESMOPRESSIN ACETATE 26.88 MCG: 4 SOLUTION INTRAVENOUS at 07:34

## 2021-01-01 RX ADMIN — PIPERACILLIN AND TAZOBACTAM 2250 MG: 2; .25 INJECTION, POWDER, LYOPHILIZED, FOR SOLUTION INTRAVENOUS at 22:46

## 2021-01-01 RX ADMIN — SODIUM CHLORIDE 61 MCG/MIN: 9 INJECTION, SOLUTION INTRAVENOUS at 07:28

## 2021-01-01 RX ADMIN — PHENYLEPHRINE HYDROCHLORIDE 180 MCG/MIN: 10 INJECTION INTRAVENOUS at 04:06

## 2021-01-01 RX ADMIN — MIDODRINE HYDROCHLORIDE 10 MG: 10 TABLET ORAL at 07:58

## 2021-01-01 RX ADMIN — FAMOTIDINE 20 MG: 10 INJECTION, SOLUTION INTRAVENOUS at 08:44

## 2021-01-01 RX ADMIN — CALCIUM CHLORIDE, MAGNESIUM CHLORIDE, DEXTROSE MONOHYDRATE, LACTIC ACID, SODIUM CHLORIDE, SODIUM BICARBONATE AND POTASSIUM CHLORIDE 1000 ML/HR: 5.15; 2.03; 22; 5.4; 6.46; 3.09; .157 INJECTION INTRAVENOUS at 06:42

## 2021-01-01 RX ADMIN — HEPARIN SODIUM AND DEXTROSE 18 UNITS/KG/HR: 10000; 5 INJECTION INTRAVENOUS at 21:00

## 2021-01-01 RX ADMIN — Medication 1334 MG: at 09:46

## 2021-01-01 RX ADMIN — MIDAZOLAM 2 MG: 1 INJECTION INTRAMUSCULAR; INTRAVENOUS at 08:40

## 2021-01-01 RX ADMIN — Medication 40 MCG/MIN: at 02:12

## 2021-01-01 RX ADMIN — SODIUM CHLORIDE 35 MCG/MIN: 9 INJECTION, SOLUTION INTRAVENOUS at 17:14

## 2021-01-01 RX ADMIN — LACTULOSE 20 G: 20 SOLUTION ORAL at 20:48

## 2021-01-01 RX ADMIN — MIDODRINE HYDROCHLORIDE 10 MG: 5 TABLET ORAL at 10:45

## 2021-01-01 RX ADMIN — DOBUTAMINE HYDROCHLORIDE 15 MCG/KG/MIN: 200 INJECTION INTRAVENOUS at 04:16

## 2021-01-01 RX ADMIN — SODIUM CHLORIDE, PRESERVATIVE FREE 10 ML: 5 INJECTION INTRAVENOUS at 07:56

## 2021-01-01 RX ADMIN — PHENYLEPHRINE HYDROCHLORIDE 50 MCG/MIN: 10 INJECTION INTRAVENOUS at 16:25

## 2021-01-01 RX ADMIN — IPRATROPIUM BROMIDE 0.5 MG: 0.5 SOLUTION RESPIRATORY (INHALATION) at 12:55

## 2021-01-01 RX ADMIN — DOBUTAMINE HYDROCHLORIDE 10 MCG/KG/MIN: 200 INJECTION INTRAVENOUS at 10:56

## 2021-01-01 RX ADMIN — SODIUM CHLORIDE, PRESERVATIVE FREE 10 ML: 5 INJECTION INTRAVENOUS at 08:44

## 2021-01-01 RX ADMIN — CALCIUM CHLORIDE, MAGNESIUM CHLORIDE, DEXTROSE MONOHYDRATE, LACTIC ACID, SODIUM CHLORIDE, SODIUM BICARBONATE AND POTASSIUM CHLORIDE 1000 ML/HR: 5.15; 2.03; 22; 5.4; 6.46; 3.09; .157 INJECTION INTRAVENOUS at 06:41

## 2021-01-01 RX ADMIN — HEPARIN SODIUM: 10000 INJECTION, SOLUTION INTRAVENOUS; SUBCUTANEOUS at 00:59

## 2021-01-01 RX ADMIN — PIPERACILLIN AND TAZOBACTAM 3375 MG: 3; .375 INJECTION, POWDER, LYOPHILIZED, FOR SOLUTION INTRAVENOUS at 17:25

## 2021-01-01 RX ADMIN — Medication 1000 ML/HR: at 09:44

## 2021-01-01 RX ADMIN — Medication 1 MG: at 08:43

## 2021-01-01 RX ADMIN — Medication 1000 ML/HR: at 16:08

## 2021-01-01 RX ADMIN — BUMETANIDE 2 MG/HR: 0.25 INJECTION INTRAMUSCULAR; INTRAVENOUS at 10:55

## 2021-01-01 RX ADMIN — FUROSEMIDE 80 MG: 10 INJECTION INTRAMUSCULAR; INTRAVENOUS at 12:41

## 2021-01-01 RX ADMIN — CALCIUM CHLORIDE, MAGNESIUM CHLORIDE, DEXTROSE MONOHYDRATE, LACTIC ACID, SODIUM CHLORIDE, SODIUM BICARBONATE AND POTASSIUM CHLORIDE 1000 ML/HR: 5.15; 2.03; 22; 5.4; 6.46; 3.09; .157 INJECTION INTRAVENOUS at 09:47

## 2021-01-01 RX ADMIN — POTASSIUM CHLORIDE 40 MEQ: 20 TABLET, EXTENDED RELEASE ORAL at 12:42

## 2021-01-01 RX ADMIN — ALBUMIN (HUMAN) 25 G: 0.25 INJECTION, SOLUTION INTRAVENOUS at 22:04

## 2021-01-01 RX ADMIN — ALBUMIN (HUMAN) 25 G: 0.25 INJECTION, SOLUTION INTRAVENOUS at 23:04

## 2021-01-01 RX ADMIN — HEPARIN SODIUM AND DEXTROSE 12 UNITS/KG/HR: 10000; 5 INJECTION INTRAVENOUS at 04:35

## 2021-01-01 RX ADMIN — FENTANYL CITRATE 100 MCG: 50 INJECTION, SOLUTION INTRAMUSCULAR; INTRAVENOUS at 15:02

## 2021-01-01 ASSESSMENT — ENCOUNTER SYMPTOMS
BACK PAIN: 1
SHORTNESS OF BREATH: 1
CHEST TIGHTNESS: 0
COUGH: 0
DIARRHEA: 0
RHINORRHEA: 0
VOMITING: 0
ABDOMINAL DISTENTION: 1
COLOR CHANGE: 0
RECTAL PAIN: 0
ABDOMINAL DISTENTION: 0
CHEST TIGHTNESS: 1
SHORTNESS OF BREATH: 1
ABDOMINAL PAIN: 0
NAUSEA: 0
VOMITING: 0
WHEEZING: 0
SORE THROAT: 0
CONSTIPATION: 0
BLOOD IN STOOL: 0
SINUS PRESSURE: 0
DIARRHEA: 0
PHOTOPHOBIA: 0
COUGH: 0
SHORTNESS OF BREATH: 1
SHORTNESS OF BREATH: 1
COUGH: 0
BACK PAIN: 0
TROUBLE SWALLOWING: 0
ANAL BLEEDING: 0
EYE PAIN: 0
SINUS PAIN: 0
ABDOMINAL PAIN: 0
CONSTIPATION: 0
ABDOMINAL DISTENTION: 0

## 2021-01-01 ASSESSMENT — PAIN DESCRIPTION - ORIENTATION: ORIENTATION: RIGHT

## 2021-01-01 ASSESSMENT — PULMONARY FUNCTION TESTS
PIF_VALUE: 19
PIF_VALUE: 21
PIF_VALUE: 23
PIF_VALUE: 19
PIF_VALUE: 18
PIF_VALUE: 21
PIF_VALUE: 20
PIF_VALUE: 21
PIF_VALUE: 24
PIF_VALUE: 21
PIF_VALUE: 19

## 2021-01-01 ASSESSMENT — PAIN SCALES - GENERAL
PAINLEVEL_OUTOF10: 0
PAINLEVEL_OUTOF10: 0
PAINLEVEL_OUTOF10: 3
PAINLEVEL_OUTOF10: 0

## 2021-01-01 ASSESSMENT — PAIN DESCRIPTION - DESCRIPTORS: DESCRIPTORS: DULL

## 2021-01-06 NOTE — PROGRESS NOTES
[address] [facility]  PHYSICAL THERAPY  [] EVALUATION  [x] DAILY NOTE (LAND) [] DAILY NOTE (AQUATIC ) [] PROGRESS NOTE [] DISCHARGE NOTE    [x] OUTPATIENT REHABILITATION CENTER [facility]    [] [place] [address]    [] [facility]   [] Carry Minder    Date: 2021  Patient Name:  Alice Scott  : 1930  MRN: [id number]  CSN: [id number]    Referring Practitioner Bertha Edmond MD   Diagnosis Unspecified kyphosis, site unspecified [M40.209]    Treatment Diagnosis Lumbago, spinal stiffness, B hip stiffness, LLE weakness, thoracic kyphosis abnormal posture   Date of Evaluation 20    Additional Pertinent History Hx of lumbar pain,  lumbar L4/L5 fracture, non surgical healing. 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 2018 epidural with relief for about a year, repeated in  and 2020. Patient reports left lateral thigh and anterior thigh has pins/needles. EMG Moderate L5 radiculopathy causing foot drop. Dr. 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 Functional Outcome Measure Used Tinetti Balance and Gait assessment    Functional Outcome Score  Moderate fall risk (20)   25/28 Low fall risk (20)      Insurance: Primary: Payor: Sona Murillo /  /  / ,   Secondary: HUMANA   Authorization Information: 20% copay, secondary humana. Aquatic and modalities covered. Plans to change his secondary coverage 21.     Visit # 11, 1/10 for progress note    Visits Allowed: Unlimited pain    Recertification Date:    Physician Follow-Up: unknown   Physician Orders: PT order 2020 History of Present Illness: Since having epidural in January 2020 he had relief of lumbar pain. He lifted 60 lb bag of play sand around September and his pain has been elevated since, developed forward bend posture of kyphosis. November 3 SI epidural again. Family doctor referred to PT for kyphosis. SUBJECTIVE: Patient has been requesting EMG from his family physician, order placed with Dr. Baljit Durán. He denies back pain but still feels LLE weakness and forward bend posture. Performing HEP.      TREATMENT   Precautions:    Pain: Lumbar pain, LLE weakness, thoracic kyphosis posture    X in shaded column indicates activity completed today   Modalities Parameters/  Location  Notes                     Manual Therapy Time/Technique  Notes                     Exercise/Intervention   Notes   NuStep warm up Seat 11 arms 10  5 min Level 4 x           Standing trunk extension against wall 5x 10 sec  Wall to occiput 6 cm, cues for cervical retraction          Supine trunk elongation stretch - overhead reach for thoracic extension 60 sec   No pillow, tactile cues for scapular retraction   Hooklying piriformis stretch bilaterally  2x 20 sec  HEP   Longitudinal ER/IR bilateral  10x ea   HEP   Supine bridge     HEP   Supine hip flexor stretch off edge of plinth  2x 20 sec  HEP   Sidelying clamshell  15x   HEP   SLR  20x             Standing balance activities     HEP   Tandem stance        Blue foam normal stance - head turns lateral/vertical              Hydrostick - forward, lateral 20x   x Cues for upright posture   Nautilus seated row 20x 20 lb x    Hydrohip bilateral and single leg 20x ea Level 5 x Red wedge and pillow   Nautilus Lat pull down 20x 30 lb x    Nautilus chest press 20x 40 lb x    Nautilus Pec Deck horiz adduction 20x 15 lb x Specific Interventions Next Treatment: NuStep warm up, Posterior tilt standing against wall, thoracic extension seated with ball behind back, hooklying bridge, hip flexor off edge, piriformis stretch, longitudinal hip ER/IR, sidelying clamshell, IT band stretching     Activity/Treatment Tolerance:  [x]  Patient tolerated treatment well  []  Patient limited by fatigue  []  Patient limited by pain   []  Patient limited by medical complications  []  Other:     Assessment: Denies pain, progressed upper body strengthening with good tolerance but more forward flexed posture with standing hydrostick today. GOALS:  Patient Goal: decrease back pain, walk more upright    Short Term Goals:  Time Frame: 6 weeks    Patient will improve BLE AROM to 0-90 deg hamstring, 0-120 deg piriformis stretch, 0-15 deg hip extension in order to bend and lift without straining lumbar spine. Patient will demonstrate good abdominal bracing and body mechanics during therapy session in order to preserve neutral spine during household tasks. Long Term Goals:  Time Frame: 12 weeks    Patient will squat to lift 10 lb weight from floor to standing with good body mechanics in order to perform household tasks and place items in grocery cart. Patient will improve LLE strength to 4+/5 in order to squat, lunge, and navigate steps without difficulty. Patient will decrease bqgk-xp-[id number] measurement from baseline 7cm to less than 4 cm in order to maintain upright thoracic trunk alignment. Patient Education:   []  HEP/Education Completed: Plan of Care, Goals, Posterior tilt standing against wall, hooklying bridge, hip flexor off edge, piriformis stretch, longitudinal hip ER/IR, sidelying clamshell   Medbridge Access Code: [email or web address]   []  No new Education completed  [x]  Reviewed Prior HEP      [x]  Patient verbalized and/or demonstrated understanding of education provided. []  Patient unable to verbalize and/or demonstrate understanding of education provided. Will continue education. []  Barriers to learning: none    PLAN:  Treatment Recommendations: Strengthening, Range of Motion, Balance Training, Functional Mobility Training, Transfer Training, Endurance Training, Gait Training, Stair Training, Manual Therapy - Soft Tissue Mobilization, Manual Therapy - Joint Manipulation, Pain Management, Home Exercise Program, Patient Education, Safety Education and Training, Integrative Dry Needling, Aquatics and Modalities    []  Plan of care initiated. Plan to see patient 2 times per week for 12 weeks to address the treatment planned outlined above.   [x]  Continue with current plan of care  []  Modify plan of care as follows:    []  Hold pending physician visit  []  Discharge    Time In 1503   Time Out 1545   Timed Code Minutes: 42   Total Treatment Time: 42       Electronically Signed by: Zohra Pulido

## 2021-01-08 NOTE — PROGRESS NOTES
[address] [facility]  PHYSICAL THERAPY  [] EVALUATION  [x] DAILY NOTE (LAND) [] DAILY NOTE (AQUATIC ) [] PROGRESS NOTE [] DISCHARGE NOTE    [x] OUTPATIENT REHABILITATION CENTER [facility]    [] [place] [address]    [] [facility]    [] Cecilia Asper    Date: 2021  Patient Name:  Onesimo Farrell  : 1930  MRN: [id number]  CSN: [id number]    Referring Practitioner Saumya Mojica MD   Diagnosis Unspecified kyphosis, site unspecified [M40.209]    Treatment Diagnosis Lumbago, spinal stiffness, B hip stiffness, LLE weakness, thoracic kyphosis abnormal posture   Date of Evaluation 20    Additional Pertinent History Hx of lumbar pain,  lumbar L4/L5 fracture, non surgical healing. 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 2018 epidural with relief for about a year, repeated in  and 2020. Patient reports left lateral thigh and anterior thigh has pins/needles. EMG Moderate L5 radiculopathy causing foot drop. Dr. 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 Functional Outcome Measure Used Tinetti Balance and Gait assessment    Functional Outcome Score  Moderate fall risk (20)   25/28 Low fall risk (20)       Insurance: Primary: Payor: Tony Vinson /  /  / ,   Secondary: HU[employer]NA    Authorization Information: 20% copay, secondary humana. Aquatic and modalities covered. Plans to change his secondary coverage 21. Visit # 12, 2/10 for progress note    Visits Allowed: Unlimited pain    Recertification Date:    Physician Follow-Up: unknown   Physician Orders: PT order 2020    History of Present Illness: Since having epidural in 2020 he had relief of lumbar pain. He lifted 60 lb bag of play sand around September and his pain has been elevated since, developed forward bend posture of kyphosis. November 3 SI epidural again. Family doctor referred to PT for kyphosis. SUBJECTIVE: Patient has been experiencing Left lateral thigh pain for past couple days making it difficult to sleep. Patient has been performing ankle stability exercises. He is still having difficulty with scheduling EMG, currently Dr. Vika Doshi not performing EMG due to coronavirus? Recommended to contact [address] as alternate option.     TREATMENT   Precautions:    Pain: Lumbar pain, LLE weakness, thoracic kyphosis posture    X in shaded column indicates activity completed today   Modalities Parameters/  Location  Notes                     Manual Therapy Time/Technique  Notes                     Exercise/Intervention   Notes   NuStep warm up Seat 9 arms 10  5 min Level 4 x           Standing trunk extension against wall 5x 10 sec  Wall to occiput 6 cm, cues for cervical retraction          Supine trunk elongation stretch - overhead reach for thoracic extension 60 sec  x No pillow, tactile cues for scapular retraction   Hooklying piriformis stretch, hamstring stretch  2x 20 sec x     Longitudinal ER/IR bilateral  10x ea   HEP    Supine bridge     HEP    Supine hip flexor stretch off edge of plinth  2x 20 sec x     Sidelying clamshell  20x  x    SLR  20x             Standing balance activities     HEP   Tandem stance        Blue foam normal stance - head turns lateral/vertical              Hydrostick - forward, lateral 20x   x Cues for upright posture   Hydrohip bilateral and single leg  20x Level 5 x Red wedge and pillow   NK table       Seated hip ER/IR              Nautilus seated row 20x 20 lb     Nautilus Lat pull down 20x 30 lb     Nautilus chest press 20x 40 lb     Nautilus Pec Deck horiz adduction 20x 15 lb            Specific Interventions Next Treatment: NuStep warm up, Posterior tilt standing against wall, thoracic extension seated with ball behind back, hooklying bridge, hip flexor off edge, piriformis stretch, longitudinal hip ER/IR, sidelying clamshell, IT band stretching     Activity/Treatment Tolerance:  [x]  Patient tolerated treatment well  []  Patient limited by fatigue  []  Patient limited by pain   []  Patient limited by medical complications  []  Other:     Assessment: Held upper body strengthening today due to return of LLE radicular pain this week. Reviewed HEP and advised to monitor leg pain. Overall patient very preoccupied with asking about his EMG today and needed frequent cues to attend to exercises. GOALS:  Patient Goal: decrease back pain, walk more upright    Short Term Goals:  Time Frame: 6 weeks    Patient will improve BLE AROM to 0-90 deg hamstring, 0-120 deg piriformis stretch, 0-15 deg hip extension in order to bend and lift without straining lumbar spine. Patient will demonstrate good abdominal bracing and body mechanics during therapy session in order to preserve neutral spine during household tasks. Long Term Goals:  Time Frame: 12 weeks    Patient will squat to lift 10 lb weight from floor to standing with good body mechanics in order to perform household tasks and place items in grocery cart. Patient will improve LLE strength to 4+/5 in order to squat, lunge, and navigate steps without difficulty. Patient will decrease uhcl-uh-[id number] measurement from baseline 7cm to less than 4 cm in order to maintain upright thoracic trunk alignment. Patient Education:   []  HEP/Education Completed: Plan of Care, Goals, Posterior tilt standing against wall, hooklying bridge, hip flexor off edge, piriformis stretch, longitudinal hip ER/IR, sidelying clamshell   Medbridge Access Code: [email or web address]   []  No new Education completed  [x]  Reviewed Prior HEP      [x]  Patient verbalized and/or demonstrated understanding of education provided. []  Patient unable to verbalize and/or demonstrate understanding of education provided. Will continue education.   []  Barriers to learning: none    PLAN:  Treatment

## 2021-01-12 NOTE — PROGRESS NOTES
History of Present Illness: Since having epidural in January 2020 he had relief of lumbar pain. He lifted 60 lb bag of play sand around September and his pain has been elevated since, developed forward bend posture of kyphosis. November 3 SI epidural again. Family doctor referred to PT for kyphosis. SUBJECTIVE: Patient is having significant LLE radicular pain and unable to sleep very comfortably last night, most of the pain is when laying on his back or in long sitting. When he is awake during the day he is better but by 9pm at night he can begin to sense the LLE paresthesias and warmth. Still primarily in left lateral thigh and anterior thigh. He is trying to get EMG to determine lumbar radiculopathy vs peroneal atrophy he has contacted [place] practitioner.      TREATMENT   Precautions:    Pain: Lumbar pain, LLE weakness, thoracic kyphosis posture    X in shaded column indicates activity completed today   Modalities Parameters/  Location  Notes                     Manual Therapy Time/Technique  Notes                     Exercise/Intervention   Notes   NuStep warm up Seat 10 arms 10  5 min Level 4 x           Standing trunk extension against wall 5x 10 sec x Wall to occiput 6 cm, cues for cervical retraction          Supine trunk elongation stretch - overhead reach for thoracic extension 60 sec   No pillow, tactile cues for scapular retraction   Hooklying piriformis stretch, hamstring stretch  2x 20 sec      Longitudinal ER/IR bilateral  10x ea      Supine bridge        Supine hip flexor stretch off edge of plinth  2x 20 sec      Sidelying clamshell  20x      SLR  20x             Standing balance activities     HEP   Tandem stance        Blue foam normal stance - head turns lateral/vertical              Hydrostick - forward, lateral 20x    Cues for upright posture   Hydrohip bilateral and single leg  20x Level 5 x Red wedge and pillow   NK table LLE flex/ext 20x 2.5 lb ext  5 lb flex x    Seated hip ER/IR Total gym squat and heel raise, LLE single squat 20x Level J x           Nautilus seated row 20x 20 lb x    Nautilus Lat pull down 20x 30 lb x    Nautilus chest press 20x 40 lb     Nautilus Pec Deck horiz adduction 20x 15 lb            Specific Interventions Next Treatment: NuStep warm up, Posterior tilt standing against wall, thoracic extension seated with ball behind back, hooklying bridge, hip flexor off edge, piriformis stretch, longitudinal hip ER/IR, sidelying clamshell, IT band stretching     Activity/Treatment Tolerance:  [x]  Patient tolerated treatment well  []  Patient limited by fatigue  []  Patient limited by pain   []  Patient limited by medical complications  []  Other:     Assessment: Continued to progress LLE strengthening and postural exercises for thoracic stabilization with good tolerance. Fatigued at end of session and took 5 mins seated rest.     GOALS:  Patient Goal: decrease back pain, walk more upright    Short Term Goals:  Time Frame: 6 weeks    Patient will improve BLE AROM to 0-90 deg hamstring, 0-120 deg piriformis stretch, 0-15 deg hip extension in order to bend and lift without straining lumbar spine. Patient will demonstrate good abdominal bracing and body mechanics during therapy session in order to preserve neutral spine during household tasks. Long Term Goals:  Time Frame: 12 weeks    Patient will squat to lift 10 lb weight from floor to standing with good body mechanics in order to perform household tasks and place items in grocery cart. Patient will improve LLE strength to 4+/5 in order to squat, lunge, and navigate steps without difficulty. Patient will decrease ycyk-al-urafyju measurement from baseline 7cm to less than 4 cm in order to maintain upright thoracic trunk alignment.          Patient Education: []  HEP/Education Completed: Plan of Care, Goals, Posterior tilt standing against wall, hooklying bridge, hip flexor off edge, piriformis stretch, longitudinal hip ER/IR, sidelying clamshell   Medbridge Access Code: [email or web address]   []  No new Education completed  [x]  Reviewed Prior HEP      [x]  Patient verbalized and/or demonstrated understanding of education provided. []  Patient unable to verbalize and/or demonstrate understanding of education provided. Will continue education. []  Barriers to learning: none    PLAN:  Treatment Recommendations: Strengthening, Range of Motion, Balance Training, Functional Mobility Training, Transfer Training, Endurance Training, Gait Training, Stair Training, Manual Therapy - Soft Tissue Mobilization, Manual Therapy - Joint Manipulation, Pain Management, Home Exercise Program, Patient Education, Safety Education and Training, Integrative Dry Needling, Aquatics and Modalities    []  Plan of care initiated. Plan to see patient 2 times per week for 12 weeks to address the treatment planned outlined above.   [x]  Continue with current plan of care  []  Modify plan of care as follows:    []  Hold pending physician visit  []  Discharge    Time In 1300   Time Out 1350   Timed Code Minutes: 45   Total Treatment Time: 45       Electronically Signed by: Lolita Oswald

## 2021-01-15 NOTE — PROGRESS NOTES
[facility]  PHYSICAL THERAPY  [] EVALUATION  [x] DAILY NOTE (LAND) [] DAILY NOTE (AQUATIC ) [] PROGRESS NOTE [] DISCHARGE NOTE    [x] OUTPATIENT REHABILITATION CENTER [facility]    [] [place] [address]    [] [facility]     [] Liane Martinez    Date: 1/15/2021  Patient Name:  Melinda King  : 1930  MRN: [id number]  CSN: [id number]    Referring Practitioner Herman Whyte MD   Diagnosis Unspecified kyphosis, site unspecified [M40.209]    Treatment Diagnosis Lumbago, spinal stiffness, B hip stiffness, LLE weakness, thoracic kyphosis abnormal posture   Date of Evaluation 20    Additional Pertinent History Hx of lumbar pain,  lumbar L4/L5 fracture, non surgical healing. 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 2018 epidural with relief for about a year, repeated in  and 2020. Patient reports left lateral thigh and anterior thigh has pins/needles. EMG Moderate L5 radiculopathy causing foot drop. Dr. 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 Functional Outcome Measure Used Tinetti Balance and Gait assessment    Functional Outcome Score  Moderate fall risk (20)   25 Low fall risk (20)       Insurance: Primary: Payor: Tremaine Coates /  /  / ,   Secondary: HUMANA    Authorization Information: 20% copay, secondary humana. Aquatic and modalities covered. Plans to change his secondary coverage 21.     Visit # 14, 4/10 for progress note    Visits Allowed: Unlimited pain    Recertification Date:    Physician Follow-Up: Dr. Kristian Gleason follow up 2021, Covid vaccine ,  Ray    Physician Orders: PT order 2020 History of Present Illness: Since having epidural in January 2020 he had relief of lumbar pain. He lifted 60 lb bag of play sand around September and his pain has been elevated since, developed forward bend posture of kyphosis. November 3 SI epidural again. Family doctor referred to PT for kyphosis. SUBJECTIVE: Patient is still having LLE radicular pain in his lateral and anterior thigh which his keeping him from sleeping, has to sleep upright in chair. Pain is in the evenings around 9pm and it can elevate to 5/10 intensity in the left leg.      TREATMENT   Precautions:    Pain: Lumbar pain, LLE weakness, thoracic kyphosis posture    X in shaded column indicates activity completed today   Modalities Parameters/  Location  Notes                     Manual Therapy Time/Technique  Notes                     Exercise/Intervention   Notes   NuStep warm up Seat 10 arms 10  5 min Level 6 x           Standing trunk extension against wall 5x 10 sec  Wall to occiput 6 cm, cues for cervical retraction          Supine trunk elongation stretch - overhead reach for thoracic extension 60 sec   No pillow, tactile cues for scapular retraction   Hooklying piriformis stretch, hamstring stretch  2x 20 sec      Longitudinal ER/IR bilateral  10x ea      Supine bridge        Supine hip flexor stretch off edge of plinth  2x 20 sec      Sidelying clamshell  20x      SLR  20x             Standing balance activities     HEP   Tandem stance        Blue foam normal stance - head turns lateral/vertical              Hydrostick - forward, lateral 20x    Cues for upright posture   Hydrohip bilateral and single leg  20x Level 5 x Red wedge and pillow   NK table flex/ext bilateral 20x 5 lb ext  7.5 lb flex x    Seated hip ER/IR       Total gym squat and heel raise, LLE single squat  20x Level J x           Nautilus seated row 25x 20 lb x    Nautilus Lat pull down 20x 30 lb     Nautilus chest press 20x 40 lb Leila Pec Deck horiz adduction 20x 15 lb            Specific Interventions Next Treatment: NuStep warm up, Posterior tilt standing against wall, thoracic extension seated with ball behind back, hooklying bridge, hip flexor off edge, piriformis stretch, longitudinal hip ER/IR, sidelying clamshell, IT band stretching     Activity/Treatment Tolerance:  [x]  Patient tolerated treatment well  []  Patient limited by fatigue  []  Patient limited by pain   []  Patient limited by medical complications  []  Other:     Assessment: Made progressions today with bilateral knee extensions and increased reps for row machine with good postural awareness throughout. Plan to progress to standing balance tasks next time    GOALS:  Patient Goal: decrease back pain, walk more upright    Short Term Goals:  Time Frame: 6 weeks    Patient will improve BLE AROM to 0-90 deg hamstring, 0-120 deg piriformis stretch, 0-15 deg hip extension in order to bend and lift without straining lumbar spine. Patient will demonstrate good abdominal bracing and body mechanics during therapy session in order to preserve neutral spine during household tasks. Long Term Goals:  Time Frame: 12 weeks    Patient will squat to lift 10 lb weight from floor to standing with good body mechanics in order to perform household tasks and place items in grocery cart. Patient will improve LLE strength to 4+/5 in order to squat, lunge, and navigate steps without difficulty. Patient will decrease taqd-gi-omrnyab measurement from baseline 7cm to less than 4 cm in order to maintain upright thoracic trunk alignment.          Patient Education:   []  HEP/Education Completed: Plan of Care, Goals, Posterior tilt standing against wall, hooklying bridge, hip flexor off edge, piriformis stretch, longitudinal hip ER/IR, sidelying clamshell   [email or web address] Access Code: [email or web address]   []  No new Education completed  [x]  Reviewed Prior HEP [x]  Patient verbalized and/or demonstrated understanding of education provided. []  Patient unable to verbalize and/or demonstrate understanding of education provided. Will continue education. []  Barriers to learning: none    PLAN:  Treatment Recommendations: Strengthening, Range of Motion, Balance Training, Functional Mobility Training, Transfer Training, Endurance Training, Gait Training, Stair Training, Manual Therapy - Soft Tissue Mobilization, Manual Therapy - Joint Manipulation, Pain Management, Home Exercise Program, Patient Education, Safety Education and Training, Integrative Dry Needling, Aquatics and Modalities    []  Plan of care initiated. Plan to see patient 2 times per week for 12 weeks to address the treatment planned outlined above.   [x]  Continue with current plan of care  []  Modify plan of care as follows:    []  Hold pending physician visit  []  Discharge    Time In 1300   Time Out 1350   Timed Code Minutes: 45   Total Treatment Time: 45       Electronically Signed by: Krissy Talbot

## 2021-01-20 NOTE — PROGRESS NOTES
[facility]  PHYSICAL THERAPY  [] EVALUATION  [x] DAILY NOTE (LAND) [] DAILY NOTE (AQUATIC ) [] PROGRESS NOTE [] DISCHARGE NOTE    [x] OUTPATIENT REHABILITATION CENTER [facility]    [] [place] [address]    [] [facility]     [] Rakel Eddy    Date: 2021  Patient Name:  Lissa Cuevas  : 1930  MRN: [id number]  CSN: [id number]    Referring Practitioner Familia Pereira MD   Diagnosis Unspecified kyphosis, site unspecified [M40.209]    Treatment Diagnosis Lumbago, spinal stiffness, B hip stiffness, LLE weakness, thoracic kyphosis abnormal posture   Date of Evaluation 20    Additional Pertinent History Hx of lumbar pain,  lumbar L4/L5 fracture, non surgical healing. 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 2018 epidural with relief for about a year, repeated in  and 2020. Patient reports left lateral thigh and anterior thigh has pins/needles. EMG Moderate L5 radiculopathy causing foot drop. Dr. Meghann Baig determined patient also has peroneal atrophy which causes his ankle weakness and ankle instability. Functional Outcome Measure Used Tinetti Balance and Gait assessment    Functional Outcome Score  Moderate fall risk (20)   25/28 Low fall risk (20)       Insurance: Primary: Payor: [employer] /  /  / ,   Secondary: HUMANA    Authorization Information: 20% copay, secondary humana. Aquatic and modalities covered. Plans to change his secondary coverage 21.     Visit # 15, 5/10 for progress note    Visits Allowed: Unlimited pain    Recertification Date: [id number]   Physician Follow-Up: Dr. Nay Walls follow up 2021, Covid vaccine ,  Ray    Physician Orders: PT order 2020 History of Present Illness: Since having epidural in January 2020 he had relief of lumbar pain. He lifted 60 lb bag of play sand around September and his pain has been elevated since, developed forward bend posture of kyphosis. November 3 SI epidural again. Family doctor referred to PT for kyphosis. SUBJECTIVE: Patient feels a little less pain in the left leg, not as severe radicular pain. Able to sleep better than last week.      TREATMENT   Precautions:    Pain: Lumbar pain, LLE weakness, thoracic kyphosis posture    X in shaded column indicates activity completed today   Modalities Parameters/  Location  Notes                     Manual Therapy Time/Technique  Notes                     Exercise/Intervention   Notes   NuStep warm up Seat 10 arms 10  5 min Level 5 x           Standing trunk extension against wall 5x 10 sec x Wall to occiput 6 cm, cues for cervical retraction and deep breath in   Corner stretch 3x 10 sec x Cues for technique   Supine trunk elongation stretch - overhead reach for thoracic extension 60 sec   No pillow, tactile cues for scapular retraction   Hooklying piriformis stretch, hamstring stretch  2x 20 sec      Longitudinal ER/IR bilateral  10x ea      Supine bridge        Supine hip flexor stretch off edge of plinth  2x 20 sec      Sidelying clamshell  20x      SLR  20x             Standing balance activities     HEP   Tandem stance        Blue foam normal stance - head turns lateral/vertical              Hydrostick - forward, lateral 20x    Cues for upright posture   Hydrohip bilateral and single leg  20x Level 5 x Red wedge and pillow   NK table flex/ext bilateral 20x 5 lb ext  7.5 lb flex x    Seated hip ER/IR       Total gym squat and heel raise, LLE single squat  20x Level J x No single squat today          Nautilus seated row 20x 30 lb x    Nautilus Lat pull down 20x 30 lb     Nautilus chest press 20x 30 lb x    Nautilus Pec Deck horiz adduction 20x 15 lb x Specific Interventions Next Treatment: NuStep warm up, Posterior tilt standing against wall, thoracic extension seated with ball behind back, hooklying bridge, hip flexor off edge, piriformis stretch, longitudinal hip ER/IR, sidelying clamshell, IT band stretching     Activity/Treatment Tolerance:  [x]  Patient tolerated treatment well  []  Patient limited by fatigue  []  Patient limited by pain   []  Patient limited by medical complications  []  Other:     Assessment: Increased resistance on machines today and emphasized daily postural HEP including extending upright against a wall. Plan to progress to standing balance tasks next time and scapular stabilization tasks with overhead reaching. GOALS:  Patient Goal: decrease back pain, walk more upright    Short Term Goals:  Time Frame: 6 weeks    Patient will improve BLE AROM to 0-90 deg hamstring, 0-120 deg piriformis stretch, 0-15 deg hip extension in order to bend and lift without straining lumbar spine. Patient will demonstrate good abdominal bracing and body mechanics during therapy session in order to preserve neutral spine during household tasks. Long Term Goals:  Time Frame: 12 weeks    Patient will squat to lift 10 lb weight from floor to standing with good body mechanics in order to perform household tasks and place items in grocery cart. Patient will improve LLE strength to 4+/5 in order to squat, lunge, and navigate steps without difficulty. Patient will decrease spmb-su-nyvosjo measurement from baseline 7cm to less than 4 cm in order to maintain upright thoracic trunk alignment.          Patient Education:   []  HEP/Education Completed: Plan of Care, Goals, Posterior tilt standing against wall, hooklying bridge, hip flexor off edge, piriformis stretch, longitudinal hip ER/IR, sidelying clamshell   [employer] Access Code: [email or web address]   []  No new Education completed  [x]  Reviewed Prior HEP [x]  Patient verbalized and/or demonstrated understanding of education provided. []  Patient unable to verbalize and/or demonstrate understanding of education provided. Will continue education. []  Barriers to learning: none    PLAN:  Treatment Recommendations: Strengthening, Range of Motion, Balance Training, Functional Mobility Training, Transfer Training, Endurance Training, Gait Training, Stair Training, Manual Therapy - Soft Tissue Mobilization, Manual Therapy - Joint Manipulation, Pain Management, Home Exercise Program, Patient Education, Safety Education and Training, Integrative Dry Needling, Aquatics and Modalities    []  Plan of care initiated. Plan to see patient 2 times per week for 12 weeks to address the treatment planned outlined above.   [x]  Continue with current plan of care  []  Modify plan of care as follows:    []  Hold pending physician visit  []  Discharge    Time In 1505   Time Out 1550   Timed Code Minutes: 45   Total Treatment Time: 45       Electronically Signed by: Tawanna Dejesus

## 2021-01-26 NOTE — PROGRESS NOTES
History of Present Illness: Since having epidural in January 2020 he had relief of lumbar pain. He lifted 60 lb bag of play sand around September and his pain has been elevated since, developed forward bend posture of kyphosis. November 3 SI epidural again. Family doctor referred to PT for kyphosis. SUBJECTIVE: Patient intended to go swimming on Saturday but he was fatigued from COVID vaccine and so he is returning today after his session, typically performs 300m breaststroke, walking.      TREATMENT   Precautions:    Pain: Lumbar pain, LLE weakness, thoracic kyphosis posture    X in shaded column indicates activity completed today   Modalities Parameters/  Location  Notes                     Manual Therapy Time/Technique  Notes                     Exercise/Intervention   Notes   NuStep warm up Seat 10 arms 10  5 min Level 5 x           Standing trunk extension against wall 5x 10 sec x Wall to occiput 6 cm, cues for cervical retraction and deep breath in   Standing against wall - shoulder flex and abd to 90 deg 10x ea 1# weight x Cues for upright cervical posture   Corner stretch/ Doorway stretch 3x 10 sec x Cues for technique   Supine trunk elongation stretch - overhead reach for thoracic extension 60 sec   No pillow, tactile cues for scapular retraction   Hooklying piriformis stretch, hamstring stretch  2x 20 sec      Longitudinal ER/IR bilateral  10x ea      Supine bridge        Supine hip flexor stretch off edge of plinth  2x 20 sec      Sidelying clamshell  20x      SLR  20x             Standing balance activities     HEP   Tandem stance        Blue foam normal stance - head turns lateral/vertical              Hydrostick - forward, lateral 20x    Cues for upright posture   Hydrohip bilateral and single leg  20x Level 5 x Red wedge and pillow   NK table flex/ext bilateral 20x 5 lb ext  7.5 lb flex x    Seated hip ER/IR       Total gym squat and heel raise,  single squat  20x ea Level J x Nautilus seated row 20x 30 lb x    Nautilus Lat pull down 20x 30 lb x    Nautilus chest press 20x 30 lb     Nautilus Pec Deck horiz adduction 20x 15 lb            Specific Interventions Next Treatment: NuStep warm up, Posterior tilt standing against wall, thoracic extension seated with ball behind back, hooklying bridge, hip flexor off edge, piriformis stretch, longitudinal hip ER/IR, sidelying clamshell, IT band stretching     Activity/Treatment Tolerance:  [x]  Patient tolerated treatment well  []  Patient limited by fatigue  []  Patient limited by pain   []  Patient limited by medical complications  []  Other:     Assessment: Added UE reaching tasks while standing against wall to promote upright posture and scapular stabilization. Patient denies pain but will monitor soreness when he returns tomorrow, may benefit from stretching session if his soreness is increased. GOALS:  Patient Goal: decrease back pain, walk more upright    Short Term Goals:  Time Frame: 6 weeks    Patient will improve BLE AROM to 0-90 deg hamstring, 0-120 deg piriformis stretch, 0-15 deg hip extension in order to bend and lift without straining lumbar spine. Patient will demonstrate good abdominal bracing and body mechanics during therapy session in order to preserve neutral spine during household tasks. Long Term Goals:  Time Frame: 12 weeks    Patient will squat to lift 10 lb weight from floor to standing with good body mechanics in order to perform household tasks and place items in grocery cart. Patient will improve LLE strength to 4+/5 in order to squat, lunge, and navigate steps without difficulty. Patient will decrease zzva-ze-wyahvan measurement from baseline 7cm to less than 4 cm in order to maintain upright thoracic trunk alignment.          Patient Education: []  HEP/Education Completed: Plan of Care, Goals, Posterior tilt standing against wall, hooklying bridge, hip flexor off edge, piriformis stretch, longitudinal hip ER/IR, sidelying clateressal   Medbridge Access Code: [email or web address]   []  No new Education completed  [x]  Reviewed Prior HEP      [x]  Patient verbalized and/or demonstrated understanding of education provided. []  Patient unable to verbalize and/or demonstrate understanding of education provided. Will continue education. []  Barriers to learning: none    PLAN:  Treatment Recommendations: Strengthening, Range of Motion, Balance Training, Functional Mobility Training, Transfer Training, Endurance Training, Gait Training, Stair Training, Manual Therapy - Soft Tissue Mobilization, Manual Therapy - Joint Manipulation, Pain Management, Home Exercise Program, Patient Education, Safety Education and Training, Integrative Dry Needling, Aquatics and Modalities    []  Plan of care initiated. Plan to see patient 2 times per week for 12 weeks to address the treatment planned outlined above.   [x]  Continue with current plan of care  []  Modify plan of care as follows:    []  Hold pending physician visit  []  Discharge    Time In 904   Time Out 948   Timed Code Minutes: 44   Total Treatment Time: 44       Electronically Signed by: Joanne Leon

## 2021-01-27 NOTE — PROGRESS NOTES
[address] [facility]  PHYSICAL THERAPY  [] EVALUATION  [x] DAILY NOTE (LAND) [] DAILY NOTE (AQUATIC ) [] PROGRESS NOTE [] DISCHARGE NOTE    [x] OUTPATIENT REHABILITATION CENTER [facility]    [] [place] [address]    [] [facility]     [] Marta Coleman    Date: 2021  Patient Name:  Jose G Alex  : 1930  MRN: [id number]  CSN: [id number]    Referring Practitioner Mery Currie MD   Diagnosis Unspecified kyphosis, site unspecified [M40.209]    Treatment Diagnosis Lumbago, spinal stiffness, B hip stiffness, LLE weakness, thoracic kyphosis abnormal posture   Date of Evaluation 20    Additional Pertinent History Hx of lumbar pain,  lumbar L4/L5 fracture, non surgical healing. 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 2018 epidural with relief for about a year, repeated in  and 2020. Patient reports left lateral thigh and anterior thigh has pins/needles. EMG Moderate L5 radiculopathy causing foot drop. Dr. 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 Functional Outcome Measure Used Tinetti Balance and Gait assessment    Functional Outcome Score  Moderate fall risk (20)   25/ Low fall risk (20)       Insurance: Primary: Payor: Andry Gallardo /  /  / ,   Secondary: HUMANA    Authorization Information: 20% copay, secondary humana. Aquatic and modalities covered. Plans to change his secondary coverage 21.     Visit # 17, 7/10 for progress note    Visits Allowed: Unlimited pain    Recertification Date:    Physician Follow-Up: Dr. Minesh Jamison follow up 2021, Covid vaccine ,  Ray    Physician Orders: PT order 2020 History of Present Illness: Since having epidural in January 2020 he had relief of lumbar pain. He lifted 60 lb bag of play sand around September and his pain has been elevated since, developed forward bend posture of kyphosis. November 3 SI epidural again. Family doctor referred to PT for kyphosis. SUBJECTIVE: Patient went swimming yesterday and used the heated therapy pool to walk and marching.       TREATMENT   Precautions:    Pain: Lumbar pain, LLE weakness, thoracic kyphosis posture    X in shaded column indicates activity completed today   Modalities Parameters/  Location  Notes                     Manual Therapy Time/Technique  Notes                     Exercise/Intervention   Notes   NuStep warm up Seat 10 arms 10  5 min Level 5 x           Standing trunk extension against wall 5x 10 sec x Wall to occiput 6 cm, cues for cervical retraction and deep breath in   Standing against wall - shoulder flex and abd to 90 deg 15x ea 1# weight x Cues for upright cervical posture   Corner stretch/ Doorway stretch 3x 10 sec x Cues for technique   Supine trunk elongation stretch - overhead reach for thoracic extension 60 sec  x No pillow, tactile cues for scapular retraction   Hooklying piriformis stretch, hamstring stretch  2x 20 sec x     Longitudinal ER/IR bilateral  10x ea      Supine bridge        Supine hip flexor stretch off edge of plinth  2x 20 sec x     Sidelying clamshell  20x      SLR  20x             Standing balance activities     HEP   Tandem stance        Blue foam normal stance - head turns lateral/vertical              Hydrostick - forward, lateral 20x    Cues for upright posture   Hydrohip bilateral and single leg  20x Level 5  Red wedge and pillow   NK table flex/ext bilateral 20x 5 lb ext  7.5 lb flex     Seated hip ER/IR       Total gym squat and heel raise,  single squat  20x ea Level J            Nautilus seated row 15x 20 lb x Decreased weight and reps today Nautilus Lat pull down 15x 30 lb x    Nautilus chest press 20x 30 lb     Nautilus Pec Deck horiz adduction 20x 15 lb            Specific Interventions Next Treatment: NuStep warm up, Posterior tilt standing against wall, thoracic extension seated with ball behind back, hooklying bridge, hip flexor off edge, piriformis stretch, longitudinal hip ER/IR, sidelying clamshell, IT band stretching, scapular strengthening and overhead reaching    Activity/Treatment Tolerance:  [x]  Patient tolerated treatment well  []  Patient limited by fatigue  []  Patient limited by pain   []  Patient limited by medical complications  []  Other:     Assessment: Held lower extremity strengthening today and reviewed stretching HEP with good technique and recall, no cues needed. Patient is motivated and doing well, plan to progress to more scapular strengthening and overhead reaching as tolerated    GOALS:  Patient Goal: decrease back pain, walk more upright    Short Term Goals:  Time Frame: 6 weeks    Patient will improve BLE AROM to 0-90 deg hamstring, 0-120 deg piriformis stretch, 0-15 deg hip extension in order to bend and lift without straining lumbar spine. Patient will demonstrate good abdominal bracing and body mechanics during therapy session in order to preserve neutral spine during household tasks. Long Term Goals:  Time Frame: 12 weeks    Patient will squat to lift 10 lb weight from floor to standing with good body mechanics in order to perform household tasks and place items in grocery cart. Patient will improve LLE strength to 4+/5 in order to squat, lunge, and navigate steps without difficulty. Patient will decrease jwlf-cw-viujvax measurement from baseline 7cm to less than 4 cm in order to maintain upright thoracic trunk alignment.          Patient Education: []  HEP/Education Completed: Plan of Care, Goals, Posterior tilt standing against wall, hooklying bridge, hip flexor off edge, piriformis stretch, longitudinal hip ER/IR, sidelying clateressal   Medbridge Access Code: [email or web address]   []  No new Education completed  [x]  Reviewed Prior HEP      [x]  Patient verbalized and/or demonstrated understanding of education provided. []  Patient unable to verbalize and/or demonstrate understanding of education provided. Will continue education. []  Barriers to learning: none    PLAN:  Treatment Recommendations: Strengthening, Range of Motion, Balance Training, Functional Mobility Training, Transfer Training, Endurance Training, Gait Training, Stair Training, Manual Therapy - Soft Tissue Mobilization, Manual Therapy - Joint Manipulation, Pain Management, Home Exercise Program, Patient Education, Safety Education and Training, Integrative Dry Needling, Aquatics and Modalities    []  Plan of care initiated. Plan to see patient 2 times per week for 12 weeks to address the treatment planned outlined above.   [x]  Continue with current plan of care  []  Modify plan of care as follows:    []  Hold pending physician visit  []  Discharge    Time In 1505   Time Out 1545   Timed Code Minutes: 40   Total Treatment Time: 40       Electronically Signed by: Esvin Bates

## 2021-01-29 NOTE — PROGRESS NOTES
[address] [facility]  PHYSICAL THERAPY  [] EVALUATION  [x] DAILY NOTE (LAND) [] DAILY NOTE (AQUATIC ) [] PROGRESS NOTE [] DISCHARGE NOTE    [x] OUTPATIENT REHABILITATION CENTER [facility]    [] [place] [address]    [] [facility]     [] Doc Has    Date: 2021  Patient Name:  Kalia Alfonso  : 1930  MRN: [id number]  CSN: [id number]    Referring Practitioner Miquel Starks MD   Diagnosis Unspecified kyphosis, site unspecified [M40.209]    Treatment Diagnosis Lumbago, spinal stiffness, B hip stiffness, LLE weakness, thoracic kyphosis abnormal posture   Date of Evaluation 20    Additional Pertinent History Hx of lumbar pain,  lumbar L4/L5 fracture, non surgical healing. 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 2018 epidural with relief for about a year, repeated in  and 2020. Patient reports left lateral thigh and anterior thigh has pins/needles. EMG Moderate L5 radiculopathy causing foot drop. Dr. 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 Functional Outcome Measure Used Tinetti Balance and Gait assessment    Functional Outcome Score  Moderate fall risk (20)   25 Low fall risk (20)       Insurance: Primary: Payor: Cookie Sandoval /  /  / ,   Secondary: HUMANA    Authorization Information: 20% copay, secondary humana. Aquatic and modalities covered. Plans to change his secondary coverage 21.     Visit # 18, 8/10 for progress note    Visits Allowed: Unlimited pain    Recertification Date: 9/15/7292   Physician Follow-Up: Dr. Janet Castaneda follow up 2021, Covid vaccine ,  Ray    Physician Orders: PT order 2020 History of Present Illness: Since having epidural in January 2020 he had relief of lumbar pain. He lifted 60 lb bag of play sand around September and his pain has been elevated since, developed forward bend posture of kyphosis. November 3 SI epidural again. Family doctor referred to PT for kyphosis. SUBJECTIVE: Patient denies back pain, still senses slight discomfort in left lateral thigh but he has been able to sleep comfortably in bed again this week.       TREATMENT   Precautions:    Pain: Lumbar pain, LLE weakness, thoracic kyphosis posture    X in shaded column indicates activity completed today   Modalities Parameters/  Location  Notes                     Manual Therapy Time/Technique  Notes                     Exercise/Intervention   Notes   NuStep warm up Seat 10 arms 10  5 min Level 5 x           Standing trunk extension against wall 5x 10 sec x Wall to occiput 6 cm, cues for cervical retraction and deep breath in   Standing against wall - shoulder flex and abd to 90 deg 15x ea 1# weight x Cues for upright cervical posture   Corner stretch/ Doorway stretch 3x 10 sec  Cues for technique   Supine trunk elongation stretch - overhead reach for thoracic extension 60 sec   No pillow, tactile cues for scapular retraction   Hooklying piriformis stretch, hamstring stretch  2x 20 sec      Longitudinal ER/IR bilateral  10x ea      Supine bridge        Supine hip flexor stretch off edge of plinth  2x 20 sec      Sidelying clamshell  20x      SLR  20x                    Hydrohip bilateral and single leg  20x Level 5 x Red wedge and pillow   NK table flex/ext bilateral 20x 5 lb ext  7.5 lb flex     Seated hip ER/IR       Total gym squat and heel raise,  single squat  20x ea Level J x    Nautilus Rotary trunk 20x ea 12.5 lb x    Nautilus seated row 20x 20 lb x Decreased weight and reps today   Nautilus Lat pull down 20x 30 lb x    Nautilus chest press 20x 30 lb     Nautilus Pec Deck horiz adduction 20x 15 lb [x]  Reviewed Prior HEP      [x]  Patient verbalized and/or demonstrated understanding of education provided. []  Patient unable to verbalize and/or demonstrate understanding of education provided. Will continue education. []  Barriers to learning: none    PLAN:  Treatment Recommendations: Strengthening, Range of Motion, Balance Training, Functional Mobility Training, Transfer Training, Endurance Training, Gait Training, Stair Training, Manual Therapy - Soft Tissue Mobilization, Manual Therapy - Joint Manipulation, Pain Management, Home Exercise Program, Patient Education, Safety Education and Training, Integrative Dry Needling, Aquatics and Modalities    []  Plan of care initiated. Plan to see patient 2 times per week for 12 weeks to address the treatment planned outlined above.   [x]  Continue with current plan of care  []  Modify plan of care as follows:    []  Hold pending physician visit  []  Discharge    Time In 1500   Time Out 1547   Timed Code Minutes: 47   Total Treatment Time: 47       Electronically Signed by: Barby Christina

## 2021-02-03 NOTE — PROGRESS NOTES
[address] [facility]  PHYSICAL THERAPY  [] EVALUATION  [] DAILY NOTE (LAND) [] DAILY NOTE (AQUATIC ) [x] PROGRESS NOTE [] DISCHARGE NOTE    [x] OUTPATIENT REHABILITATION CENTER [facility]    [] [place] [address]    [] [facility]     [] May Apl    Date: 2/3/2021  Patient Name:  Trini Valderrama  : 1930  MRN: [id number]  CSN: [id number]    Referring Practitioner Henrique Krishna MD   Diagnosis Unspecified kyphosis, site unspecified [M40.209]    Treatment Diagnosis Lumbago, spinal stiffness, B hip stiffness, LLE weakness, thoracic kyphosis abnormal posture   Date of Evaluation 20    Additional Pertinent History Hx of lumbar pain,  lumbar L4/L5 fracture, non surgical healing. 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 2018 epidural with relief for about a year, repeated in  and 2020. Patient reports left lateral thigh and anterior thigh has pins/needles. EMG Moderate L5 radiculopathy causing foot drop. Dr. 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 Functional Outcome Measure Used Tinetti Balance and Gait assessment    Functional Outcome Score  Moderate fall risk (20)    Low fall risk (20)   25 Low fall risk (2/3/21)      Insurance: Primary: Payor: Bianca Mota /  /  / ,   Secondary:     Authorization Information: 20% copay, secondary humana. Aquatic and modalities covered. Plans to change his secondary coverage 21.     Visit # 19, 0/10 for progress note    Visits Allowed: Unlimited pain    Recertification Date: [id number]   Physician Follow-Up: Dr. Heriberto Hurst follow up 2021, Covid vaccine ,  Ray    Physician Orders: PT order 2020 History of Present Illness: Since having epidural in January 2020 he had relief of lumbar pain. He lifted 60 lb bag of play sand around September and his pain has been elevated since, developed forward bend posture of kyphosis. November 3 SI epidural again. Family doctor referred to PT for kyphosis. SUBJECTIVE: Patient has been attending physical therapy for 2 months, during that time he feels that his back muscles are stronger and he is able to balance better. However he is still noticing forward bent kyphosis posture with activity. Denies pain. He also continues to have intermittent paresthesias \"crawling\" discomfort in the left lateral thigh not going past the knee. This discomfort can be so intense that he has to sleep upright in a chair. Getting EMG for the left leg tomorrow since it has been 2 years from his previous test. He also may be getting another round of epidural/injections this coming month. TINETTI BALANCE TEST    BALANCE Patient is seated in a hard, armless chair   1 SITTING BALANCE 1 - Steady, safe   2. RISES FROM CHAIR 2 - Able without use of arms   3. ATTEMPTS TO RISE 2 - Able to rise, 1 attempt   4. IMMEDIATE STANDING BALANCE (FIRST 5 SECONDS) 2 - Steady without walker or other support   5. STANDING BALANCE 2 - Narrow stance without support   6. NUDGED 1 - Staggers, grabs, catches   7. EYES CLOSED 1 - Steady   8. TURNING 360 DEGREES 1 - Continuous and 1 - Steady   9. SITTING DOWN 2 - Safe,smooth motion   BALANCE SCORE 15/16       GAIT SECTION Patient stands with therapist, walks across room (+/- aids), fist at usual pace, then at rapid pace. 1.  INDICATION OF GAIT (Immediately after told to \"go\" 1 - No hesitancy   2. STEP LENGTH AND HEIGHT 1 - Step through Right and 1 - Step through Left   3. FOOT CLEARANCE 1 - Left foot clears floor and 1 - Right foot clears floor   4.   STEP SYMMETRY 1 - Right and left step length appear equal 5.  STEP CONTINUITY 1 - Steps appear continuous   6. PATH 2 - Straight without walking aid   7. TRUNK 1 - No sway but flexes knees or back or uses arms for stability   8.   WALKING TIME 0 - Heels apart   GAIT SCORE 10/12   TOTAL SCORE 25/28   Risk Indicators:  Less than/equal to 18 = high risk  19-23 Moderate risk  Greater than/equal to 24 = low risk Low fall risk             TREATMENT   Precautions:    Pain: Lumbar pain, LLE weakness, thoracic kyphosis posture    X in shaded column indicates activity completed today   Modalities Parameters/  Location  Notes                     Manual Therapy Time/Technique  Notes                     Exercise/Intervention   Notes   NuStep warm up Seat 10 arms 10  5 min Level 5 x           Standing trunk extension against wall 5x 10 sec x Wall to occiput 7 cm, cues for cervical retraction and deep breath in   Standing against wall - shoulder flex and abd to 90 deg 15x ea 2# weight x Cues for upright cervical posture   Corner stretch/ Doorway stretch 3x 10 sec  Cues for technique   Supine trunk elongation stretch - overhead reach for thoracic extension 60 sec   No pillow, tactile cues for scapular retraction   Hooklying piriformis stretch, hamstring stretch  2x 20 sec      Longitudinal ER/IR bilateral  10x ea      Supine bridge        Supine hip flexor stretch off edge of plinth  2x 20 sec      Sidelying clamshell  20x      SLR  20x                    Hydrohip bilateral and single leg  20x Level 5  Red wedge and pillow   NK table flex/ext bilateral 20x 5 lb ext  7.5 lb flex     Seated hip ER/IR       Total gym squat and heel raise,  single squat bilateral 20x ea Level J x    Nautilus Rotary trunk 20x ea 12.5 lb     Nautilus seated row 20x 20 lb x    Nautilus Lat pull down 20x 30 lb x    Nautilus chest press 20x 30 lb     Nautilus Pec Deck horiz adduction 20x 15 lb     Multi hip - abduction and extension  20x 10 lb  Cues for posture   Scapular theraband Patient will squat to lift 10 lb weight from floor to standing with good body mechanics in order to perform household tasks and place items in grocery cart. [] Goal Met [x] Goal Not Met [x] Continue Goal [] Discontinue Goal  [] Revise Goal  Goal Assessment: unsteady balance with bending and transitioning off exercise equipment, performing leg press machine but standing squats are unsteady    Patient will improve LLE strength to 4+/5 in order to squat, lunge, and navigate steps without difficulty. [] Goal Met [x] Goal Not Met [x] Continue Goal [] Discontinue Goal  [] Revise Goal  Goal Assessment: Left hip abd 4-/5, L hip flex 4/5, L knee ext 4/5, L ankle 4/5    Patient will decrease hnyv-os-cvvckvy measurement from baseline 7cm to less than 4 cm in order to maintain upright thoracic trunk alignment. [] Goal Met [x] Goal Not Met [x] Continue Goal [] Discontinue Goal  [] Revise Goal  Goal Assessment: minimal change measures 7 cm today again, thoracic kyphosis      Patient Education:   []  HEP/Education Completed: Plan of Care, Goals, Posterior tilt standing against wall, hooklying bridge, hip flexor off edge, piriformis stretch, longitudinal hip ER/IR, sidelying clamshell   Medbridge Access Code: [email or web address]   []  No new Education completed  [x]  Reviewed Prior HEP      [x]  Patient verbalized and/or demonstrated understanding of education provided. []  Patient unable to verbalize and/or demonstrate understanding of education provided. Will continue education.   []  Barriers to learning: none    PLAN:  Treatment Recommendations: Strengthening, Range of Motion, Balance Training, Functional Mobility Training, Transfer Training, Endurance Training, Gait Training, Stair Training, Manual Therapy - Soft Tissue Mobilization, Manual Therapy - Joint Manipulation, Pain Management, Home Exercise Program, Patient Education, Safety Education and Training, Integrative Dry Needling, Aquatics and Modalities []  Plan of care initiated. Plan to see patient 2 times per week for 12 weeks to address the treatment planned outlined above.   [x]  Continue with current plan of care  []  Modify plan of care as follows:    []  Hold pending physician visit  []  Discharge    Time In 1515   Time Out 1545   Timed Code Minutes: 30   Total Treatment Time: 30       Electronically Signed by: Laura Santo

## 2021-02-12 NOTE — PROGRESS NOTES
[address] [facility]  PHYSICAL THERAPY  [] EVALUATION  [x] DAILY NOTE (LAND) [] DAILY NOTE (AQUATIC ) [] PROGRESS NOTE [] DISCHARGE NOTE    [x] OUTPATIENT REHABILITATION CENTER [facility]    [] [place] [address]    [] [facility]     [] Leeanne Cadety    Date: 2021  Patient Name:  Gisel Hart  : 1930  MRN: [id number]  CSN: [id number]    Referring Practitioner Jorge Baker MD   Diagnosis Unspecified kyphosis, site unspecified [M40.209]    Treatment Diagnosis Lumbago, spinal stiffness, B hip stiffness, LLE weakness, thoracic kyphosis abnormal posture   Date of Evaluation 20    Additional Pertinent History Hx of lumbar pain,  lumbar L4/L5 fracture, non surgical healing. 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 2018 epidural with relief for about a year, repeated in  and 2020. Patient reports left lateral thigh and anterior thigh has pins/needles. EMG Moderate L5 radiculopathy causing foot drop. Dr. 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 Functional Outcome Measure Used Tinetti Balance and Gait assessment    Functional Outcome Score  Moderate fall risk (20)    Low fall risk (20)    Low fall risk (2/3/21)      Insurance: Primary: Payor: Maco Clay /  /  / ,   Secondary:     Authorization Information: 20% copay, secondary humana. Aquatic and modalities covered. Plans to change his secondary coverage 21.     Visit # 20, 1/10 for progress note    Visits Allowed: Unlimited pain    Recertification Date:    Physician Follow-Up: Dr. Hafsa Xiong follow up 2021, Covid vaccine21 and 21, Steroid Injections 2021   Physician Orders: PT order 2020 History of Present Illness: Since having epidural in January 2020 he had relief of lumbar pain. He lifted 60 lb bag of play sand around September and his pain has been elevated since, developed forward bend posture of kyphosis. November 3 SI epidural again. Family doctor referred to PT for kyphosis. EMG performed 2/4/2021 showing L3-L5 Left lumbar radiculopathy      SUBJECTIVE: Patient has not attended physical therapy for about a week, still complains of left lateral thigh paresthesias, warmth and pain. He has received an EMG test which confirms his history of lumbar radiculopathy on the left and peroneal/anterior tib motor loss.        TREATMENT   Precautions:    Pain: Lumbar pain, LLE weakness, thoracic kyphosis posture    X in shaded column indicates activity completed today   Modalities Parameters/  Location  Notes                     Manual Therapy Time/Technique  Notes                     Exercise/Intervention   Notes   NuStep warm up Seat 10 arms 10  5 min Level 5 x           Standing trunk extension against wall 5x 10 sec  Wall to occiput 7 cm, cues for cervical retraction and deep breath in   Standing against wall - shoulder flex and abd to 90 deg 15x ea 2# weight  Cues for upright cervical posture   Corner stretch/ Doorway stretch 3x 10 sec  Cues for technique   Supine trunk elongation stretch - overhead reach for thoracic extension 60 sec   No pillow, tactile cues for scapular retraction   Hooklying piriformis stretch, hamstring stretch  2x 20 sec      Longitudinal ER/IR bilateral  10x ea      Supine bridge        Supine hip flexor stretch off edge of plinth  2x 20 sec      Sidelying clamshell  20x      SLR  20x                    Hydrohip bilateral and single leg  20x Level 5 x Red wedge and pillow   NK table flex/ext bilateral  20x 5 lb each x    Seated hip ER/IR       Total gym squat and heel raise,  single squat bilateral 20x ea Level J x    Nautilus Rotary trunk 20x ea 12.5 lb Nautilus seated row 20x 20 lb x    Nautilus Lat pull down 20x 30 lb x    Nautilus chest press 20x 30 lb     Nautilus Pec Deck horiz adduction 20x 15 lb     Multi hip - abduction and extension  20x 10 lb  Cues for posture   Scapular theraband        Specific Interventions Next Treatment: NuStep warm up, Posterior tilt standing against wall, thoracic extension seated with ball behind back, hooklying bridge, hip flexor off edge, piriformis stretch, longitudinal hip ER/IR, sidelying clamshell, IT band stretching, scapular strengthening and overhead reaching     Activity/Treatment Tolerance:  [x]  Patient tolerated treatment well  []  Patient limited by fatigue  []  Patient limited by pain   []  Patient limited by medical complications  []  Other:     Assessment: Patient continues to tolerate low resistance exercises very well with good abdominal bracing and posture. Able to rise from very low seat height with no UE assist needed. No significant change in radicular pain or kyphosis posture however. Plan to complete remaining visits and discharge. GOALS:  Patient Goal: decrease back pain, walk more upright      Long Term Goals:  Time Frame: 12 weeks    Patient will squat to lift 10 lb weight from floor to standing with good body mechanics in order to perform household tasks and place items in grocery cart. Patient will improve LLE strength to 4+/5 in order to squat, lunge, and navigate steps without difficulty. Patient will decrease [id number] measurement from baseline 7cm to less than 4 cm in order to maintain upright thoracic trunk alignment.        Patient Education:   []  HEP/Education Completed: Plan of Care, Goals, Posterior tilt standing against wall, hooklying bridge, hip flexor off edge, piriformis stretch, longitudinal hip ER/IR, sidelying clamshell   Duda Access Code: [email or web address]   []  No new Education completed  [x]  Reviewed Prior HEP [x]  Patient verbalized and/or demonstrated understanding of education provided. []  Patient unable to verbalize and/or demonstrate understanding of education provided. Will continue education. []  Barriers to learning: none    PLAN:  Treatment Recommendations: Strengthening, Range of Motion, Balance Training, Functional Mobility Training, Transfer Training, Endurance Training, Gait Training, Stair Training, Manual Therapy - Soft Tissue Mobilization, Manual Therapy - Joint Manipulation, Pain Management, Home Exercise Program, Patient Education, Safety Education and Training, Integrative Dry Needling, Aquatics and Modalities    []  Plan of care initiated. Plan to see patient 2 times per week for 12 weeks to address the treatment planned outlined above.   [x]  Continue with current plan of care  []  Modify plan of care as follows:    []  Hold pending physician visit  []  Discharge    Time In 1400   Time Out 1450   Timed Code Minutes: 50   Total Treatment Time: 50       Electronically Signed by: Tawanna Dejesus

## 2021-02-17 NOTE — PROGRESS NOTES
[address] [facility]  PHYSICAL THERAPY  [] EVALUATION  [x] DAILY NOTE (LAND) [] DAILY NOTE (AQUATIC ) [] PROGRESS NOTE [] DISCHARGE NOTE    [x] OUTPATIENT REHABILITATION CENTER [facility]    [] [place] [address]    [] [facility]     [] Mary Oseguera    Date: 2021  Patient Name:  Jen Vargas  : 1930  MRN: [id number]  CSN: [id number]    Referring Practitioner Destiny Varma MD   Diagnosis Unspecified kyphosis, site unspecified [M40.209]    Treatment Diagnosis Lumbago, spinal stiffness, B hip stiffness, LLE weakness, thoracic kyphosis abnormal posture   Date of Evaluation 20    Additional Pertinent History Hx of lumbar pain,  lumbar L4/L5 fracture, non surgical healing. 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 2018 epidural with relief for about a year, repeated in  and 2020. Patient reports left lateral thigh and anterior thigh has pins/needles. EMG Moderate L5 radiculopathy causing foot drop. Dr. 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 Functional Outcome Measure Used Tinetti Balance and Gait assessment    Functional Outcome Score  Moderate fall risk (20)    Low fall risk (20)    Low fall risk (2/3/21)      Insurance: Primary: Payor: Laura Parks /  /  / ,   Secondary:     Authorization Information: 20% copay, secondary humana. Aquatic and modalities covered. Plans to change his secondary coverage 21.     Visit # 21, 2/10 for progress note    Visits Allowed: Unlimited pain    Recertification Date:    Physician Follow-Up: Dr. Maryse Estrada follow up 2021, Covid vaccine21 and 2/15/21, Steroid Injections 2021   Physician Orders: PT order 2020 History of Present Illness: Since having epidural in January 2020 he had relief of lumbar pain. He lifted 60 lb bag of play sand around September and his pain has been elevated since, developed forward bend posture of kyphosis. November 3 SI epidural again. Family doctor referred to PT for kyphosis. EMG performed 2/4/2021 showing L3-L5 Left lumbar radiculopathy      SUBJECTIVE: Patient received second COVID vaccine on Monday. Denies pain currently in the LLE, performing HEP consistently.      TREATMENT   Precautions:    Pain: Lumbar pain, LLE weakness, thoracic kyphosis posture    X in shaded column indicates activity completed today   Modalities Parameters/  Location  Notes                     Manual Therapy Time/Technique  Notes                     Exercise/Intervention   Notes   NuStep warm up Seat 10 arms 10  5 min Level 7 x           Standing trunk extension against wall 5x 10 sec  Wall to occiput 7 cm, cues for cervical retraction and deep breath in   Standing against wall - shoulder flex and abd to 90 deg 15x ea 2# weight  Cues for upright cervical posture   Corner stretch/ Doorway stretch 3x 10 sec  Cues for technique   Supine trunk elongation stretch - overhead reach for thoracic extension 60 sec   No pillow, tactile cues for scapular retraction   Hooklying piriformis stretch, hamstring stretch  2x 20 sec      Longitudinal ER/IR bilateral  10x ea      Supine bridge        Supine hip flexor stretch off edge of plinth  2x 20 sec      Sidelying clamshell  20x      SLR  20x                    Hydrohip bilateral and single leg  20x Level 5 x Red wedge and pillow   NK table flex/ext bilateral  20x 5 lb each x    Total gym squat and heel raise,  single squat bilateral 20x ea Level J x    Nautilus Rotary trunk 20x ea 12.5 lb     Nautilus seated row 2 set 10x 20 lb set 1  30 lb set 2 x    Nautilus Lat pull down 20x 30 lb x    Nautilus chest press 20x 30 lb     Nautilus Pec Deck horiz adduction 20x 15 lb Multi hip - abduction and extension  20x 10 lb  Cues for posture   Scapular theraband                                           Specific Interventions Next Treatment: NuStep warm up, Posterior tilt standing against wall, thoracic extension seated with ball behind back, hooklying bridge, hip flexor off edge, piriformis stretch, longitudinal hip ER/IR, sidelying clamshell, IT band stretching, scapular strengthening and overhead reaching     Activity/Treatment Tolerance:  [x]  Patient tolerated treatment well  []  Patient limited by fatigue  []  Patient limited by pain   []  Patient limited by medical complications  []  Other:     Assessment: LLE weakness was more prevalent today during strengthening, especially with leg press single leg. Increased kyphosis present today as well and unsteady at times with transfers off equipment, close SBA today. Patient just got COVID 2nd dose vaccine Monday so perhaps is fatigued from this? Able to progress resistance on Nustep and Row machines. Denies leg pain or back pain during session. GOALS:  Patient Goal: decrease back pain, walk more upright      Long Term Goals:  Time Frame: 12 weeks    Patient will squat to lift 10 lb weight from floor to standing with good body mechanics in order to perform household tasks and place items in grocery cart. Patient will improve LLE strength to 4+/5 in order to squat, lunge, and navigate steps without difficulty. Patient will decrease [id number]-[id number] measurement from baseline 7cm to less than 4 cm in order to maintain upright thoracic trunk alignment.        Patient Education:   []  HEP/Education Completed: Plan of Care, Goals, Posterior tilt standing against wall, hooklying bridge, hip flexor off edge, piriformis stretch, longitudinal hip ER/IR, sidelying clamshell   [employer] Access Code: [email or web address]   []  No new Education completed  [x]  Reviewed Prior HEP [x]  Patient verbalized and/or demonstrated understanding of education provided. []  Patient unable to verbalize and/or demonstrate understanding of education provided. Will continue education. []  Barriers to learning: none    PLAN:  Treatment Recommendations: Strengthening, Range of Motion, Balance Training, Functional Mobility Training, Transfer Training, Endurance Training, Gait Training, Stair Training, Manual Therapy - Soft Tissue Mobilization, Manual Therapy - Joint Manipulation, Pain Management, Home Exercise Program, Patient Education, Safety Education and Training, Integrative Dry Needling, Aquatics and Modalities    []  Plan of care initiated. Plan to see patient 2 times per week for 12 weeks to address the treatment planned outlined above.   [x]  Continue with current plan of care  []  Modify plan of care as follows:    []  Hold pending physician visit  []  Discharge    Time In 1350   Time Out 1430   Timed Code Minutes: 40   Total Treatment Time: 40       Electronically Signed by: Renae Stallings

## 2021-02-19 NOTE — PROGRESS NOTES
[address] [facility]  PHYSICAL THERAPY  [] EVALUATION  [x] DAILY NOTE (LAND) [] DAILY NOTE (AQUATIC ) [] PROGRESS NOTE [] DISCHARGE NOTE    [x] OUTPATIENT REHABILITATION CENTER [facility]    [] [place] [address]    [] [facility]     [] Johanny Torre    Date: 2021  Patient Name:  Jose Dodd  : 1930  MRN: [id number]  CSN: [id number]    Referring Practitioner Ike Goodwin MD   Diagnosis Unspecified kyphosis, site unspecified [M40.209]    Treatment Diagnosis Lumbago, spinal stiffness, B hip stiffness, LLE weakness, thoracic kyphosis abnormal posture   Date of Evaluation 20    Additional Pertinent History Hx of lumbar pain,  lumbar L4/L5 fracture, non surgical healing. 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 2018 epidural with relief for about a year, repeated in  and 2020. Patient reports left lateral thigh and anterior thigh has pins/needles. EMG Moderate L5 radiculopathy causing foot drop. Dr. 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 Functional Outcome Measure Used Tinetti Balance and Gait assessment    Functional Outcome Score  Moderate fall risk (20)    Low fall risk (20)    Low fall risk (2/3/21)      Insurance: Primary: Payor: Heather Gray /  /  / ,   Secondary:     Authorization Information: 20% copay, secondary humana. Aquatic and modalities covered. Plans to change his secondary coverage 21.     Visit # 22, 3/10 for progress note    Visits Allowed: Unlimited pain    Recertification Date:    Physician Follow-Up: Dr. Marylu San follow up 2021, Covid vaccine21 and 2/15/21, Steroid Injections 2021   Physician Orders: PT order 2020 Specific Interventions Next Treatment: NuStep warm up, Posterior tilt standing against wall, thoracic extension seated with ball behind back, hooklying bridge, hip flexor off edge, piriformis stretch, longitudinal hip ER/IR, sidelying clamshell, IT band stretching, scapular strengthening and overhead reaching     Activity/Treatment Tolerance:  [x]  Patient tolerated treatment well  []  Patient limited by fatigue  []  Patient limited by pain   []  Patient limited by medical complications  []  Other:     Assessment: Patient continues to move slowly through clinic with kyphotic posture, but during exercises he utilized good abdominal brace and scapular retraction posture. Demonstrated new technique on Leg press to be more similar to what he will perform at [facility]. GOALS:  Patient Goal: decrease back pain, walk more upright      Long Term Goals:  Time Frame: 12 weeks    Patient will squat to lift 10 lb weight from floor to standing with good body mechanics in order to perform household tasks and place items in grocery cart. Patient will improve LLE strength to 4+/5 in order to squat, lunge, and navigate steps without difficulty. Patient will decrease [id number] measurement from baseline 7cm to less than 4 cm in order to maintain upright thoracic trunk alignment. Patient Education:   []  HEP/Education Completed: Plan of Care, Goals, Posterior tilt standing against wall, hooklying bridge, hip flexor off edge, piriformis stretch, longitudinal hip ER/IR, sidelying clamshell   Medbridge Access Code: [email or web address]   []  No new Education completed  [x]  Reviewed Prior HEP      [x]  Patient verbalized and/or demonstrated understanding of education provided. []  Patient unable to verbalize and/or demonstrate understanding of education provided. Will continue education.   []  Barriers to learning: none    PLAN: Treatment Recommendations: Strengthening, Range of Motion, Balance Training, Functional Mobility Training, Transfer Training, Endurance Training, Gait Training, Stair Training, Manual Therapy - Soft Tissue Mobilization, Manual Therapy - Joint Manipulation, Pain Management, Home Exercise Program, Patient Education, Safety Education and Training, Integrative Dry Needling, Aquatics and Modalities    []  Plan of care initiated. Plan to see patient 2 times per week for 12 weeks to address the treatment planned outlined above.   [x]  Continue with current plan of care  []  Modify plan of care as follows:    []  Hold pending physician visit  []  Discharge    Time In 1300   Time Out 1345   Timed Code Minutes: 45   Total Treatment Time: 45       Electronically Signed by: Patsy Olson

## 2021-02-24 NOTE — PROGRESS NOTES
[address] [facility]  PHYSICAL THERAPY  [] EVALUATION  [x] DAILY NOTE (LAND) [] DAILY NOTE (AQUATIC ) [] PROGRESS NOTE [] DISCHARGE NOTE    [x] OUTPATIENT REHABILITATION CENTER [facility]    [] [place] [address]    [] [facility]     [] Marta Coleman    Date: 2021  Patient Name:  Jose G Alex  : 1930   MRN: [id number]  CSN: [id number]    Referring Practitioner Mery Currie MD   Diagnosis Unspecified kyphosis, site unspecified [M40.209]    Treatment Diagnosis Lumbago, spinal stiffness, B hip stiffness, LLE weakness, thoracic kyphosis abnormal posture   Date of Evaluation 20    Additional Pertinent History Hx of lumbar pain,  lumbar L4/L5 fracture, non surgical healing. 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 2018 epidural with relief for about a year, repeated in  and 2020. Patient reports left lateral thigh and anterior thigh has pins/needles. EMG Moderate L5 radiculopathy causing foot drop. Dr. 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 Functional Outcome Measure Used Tinetti Balance and Gait assessment    Functional Outcome Score  Moderate fall risk (20)    Low fall risk (20)    Low fall risk (2/3/21)      Insurance: Primary: Payor: Andry Gallardo /  /  / ,   Secondary:     Authorization Information: 20% copay, secondary humana. Aquatic and modalities covered. Plans to change his secondary coverage 21.     Visit # 22, 3/10 for progress note    Visits Allowed: Unlimited pain    Recertification Date: 3/22/6800   Physician Follow-Up: Dr. Minesh Jamison follow up 2021, Covid vaccine21 and 2/15/21, Steroid Injections 2021   Physician Orders: PT order 2020 Nautilus chest press 20x 30 lb     Nautilus Pec Deck horiz adduction 20x 15 lb                                        Specific Interventions Next Treatment: NuStep warm up, Posterior tilt standing against wall, thoracic extension seated with ball behind back, hooklying bridge, hip flexor off edge, piriformis stretch, longitudinal hip ER/IR, sidelying clamshell, IT band stretching, scapular strengthening and overhead reaching     Activity/Treatment Tolerance:  [x]  Patient tolerated treatment well  []  Patient limited by fatigue  []  Patient limited by pain   []  Patient limited by medical complications  []  Other:     Assessment: Able to make progressions today with knee extension and flexion on [employer] equipment. Plan to discharge next session and he will perform these activities at the [facility]. Good abdominal bracing and posture throughout, but kyphosis deformity remains. GOALS:  Patient Goal: decrease back pain, walk more upright      Long Term Goals:  Time Frame: 12 weeks    Patient will squat to lift 10 lb weight from floor to standing with good body mechanics in order to perform household tasks and place items in grocery cart. Patient will improve LLE strength to 4+/5 in order to squat, lunge, and navigate steps without difficulty. Patient will decrease [id number]-hh-[id number] measurement from baseline 7cm to less than 4 cm in order to maintain upright thoracic trunk alignment. Patient Education:   []  HEP/Education Completed: Plan of Care, Goals, Posterior tilt standing against wall, hooklying bridge, hip flexor off edge, piriformis stretch, longitudinal hip ER/IR, sidelying clamshell   FloTime Access Code: [email or web address]   []  No new Education completed  [x]  Reviewed Prior HEP      [x]  Patient verbalized and/or demonstrated understanding of education provided. []  Patient unable to verbalize and/or demonstrate understanding of education provided. Will continue education. []  Barriers to learning: none    PLAN:  Treatment Recommendations: Strengthening, Range of Motion, Balance Training, Functional Mobility Training, Transfer Training, Endurance Training, Gait Training, Stair Training, Manual Therapy - Soft Tissue Mobilization, Manual Therapy - Joint Manipulation, Pain Management, Home Exercise Program, Patient Education, Safety Education and Training, Integrative Dry Needling, Aquatics and Modalities    []  Plan of care initiated. Plan to see patient 2 times per week for 12 weeks to address the treatment planned outlined above.   [x]  Continue with current plan of care  []  Modify plan of care as follows:    []  Hold pending physician visit  []  Discharge    Time In 1350   Time Out 1435   Timed Code Minutes: 45   Total Treatment Time: 45       Electronically Signed by: Rabia Goodwin

## 2021-02-26 NOTE — DISCHARGE SUMMARY
[facility]  PHYSICAL THERAPY  [] EVALUATION  [] DAILY NOTE (LAND) [] DAILY NOTE (AQUATIC ) [] PROGRESS NOTE [x] DISCHARGE NOTE    [x] OUTPATIENT REHABILITATION CENTER [facility] [place]    [] [place] [address]    [] [facility]     [] [facility]    Date: 2021  Patient Name:  Benitez De La O  : 1930   MRN: [id number]  CSN: [id number]    Referring Practitioner Chapincito Mendoza MD   Diagnosis Unspecified kyphosis, site unspecified [M40.209]    Treatment Diagnosis Lumbago, spinal stiffness, B hip stiffness, LLE weakness, thoracic kyphosis abnormal posture   Date of Evaluation 20    Additional Pertinent History Hx of lumbar pain,  lumbar L4/L5 fracture, non surgical healing. 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 2018 epidural with relief for about a year, repeated in  and 2020. Patient reports left lateral thigh and anterior thigh has pins/needles. EMG Moderate L5 radiculopathy causing foot drop. Dr. Chito Zarate determined patient also has peroneal atrophy which causes his ankle weakness and ankle instability. Functional Outcome Measure Used Tinetti Balance and Gait assessment    Functional Outcome Score  Moderate fall risk (20)    Low fall risk (20)    Low fall risk (2/3/21)  25 Low fall risk (21)      Insurance: Primary: Payor: MEDICARE /  /  / ,   Secondary:     Authorization Information: 20% copay, secondary humana. Aquatic and modalities covered. Plans to change his secondary coverage 21.     Visit # 23, 4/10 for progress note    Visits Allowed: Unlimited pain    Recertification Date:    Physician Follow-Up: Dr. Radha Singer follow up 2021, Covid vaccine21 and 2/15/21, Steroid Injections 2021   Physician Orders: PT order 2020 History of Present Illness: Since having epidural in January 2020 he had relief of lumbar pain. He lifted 60 lb bag of play sand around September and his pain has been elevated since, developed forward bend posture of kyphosis. November 3 SI epidural again. Family doctor referred to PT for kyphosis. EMG performed 2/4/2021 showing L3-L5 Left lumbar radiculopathy   Epidural Injection 2/23/2021 L5 region     SUBJECTIVE:  Patient denies pain in his back. At the onset of therapy he would walk with a forward flexed posture. Since performing therapy he feels he can maintain upright posture longer durations. TINETTI BALANCE TEST    BALANCE Patient is seated in a hard, armless chair   1 SITTING BALANCE 1 - Steady, safe   2. RISES FROM CHAIR 2 - Able without use of arms   3. ATTEMPTS TO RISE 2 - Able to rise, 1 attempt   4. IMMEDIATE STANDING BALANCE (FIRST 5 SECONDS) 2 - Steady without walker or other support   5. STANDING BALANCE 2 - Narrow stance without support   6. NUDGED 1 - Staggers, grabs, catches   7. EYES CLOSED 1 - Steady   8. TURNING 360 DEGREES 1 - Continuous and 1 - Steady   9. SITTING DOWN 2 - Safe,smooth motion   BALANCE SCORE 15/16       GAIT SECTION Patient stands with therapist, walks across room (+/- aids), fist at usual pace, then at rapid pace. 1.  INDICATION OF GAIT (Immediately after told to \"go\" 1 - No hesitancy   2. STEP LENGTH AND HEIGHT 1 - Step through Right and 1 - Step through Left   3. FOOT CLEARANCE 1 - Left foot clears floor and 1 - Right foot clears floor   4. STEP SYMMETRY 1 - Right and left step length appear equal   5. STEP CONTINUITY 1 - Steps appear continuous   6. PATH 2 - Straight without walking aid   7. TRUNK 1 - No sway but flexes knees or back or uses arms for stability   8.   WALKING TIME 0 - Heels apart   GAIT SCORE 10/12   TOTAL SCORE 25/28   Risk Indicators:  Less than/equal to 18 = high risk  19-23 Moderate risk Greater than/equal to 24 = low risk Low fall risk             TREATMENT   Precautions:    Pain: Lumbar pain, LLE weakness, thoracic kyphosis posture    X in shaded column indicates activity completed today   Modalities Parameters/  Location  Notes                     Manual Therapy Time/Technique  Notes                     Exercise/Intervention   Notes   NuStep warm up Seat 10 arms 10  5 min Level 5 x           Standing trunk extension against wall 5x 10 sec x Wall to occiput 7 cm, cues for cervical retraction and deep breath in   Standing against wall - shoulder flex and abd to 90 deg 15x ea 2# weight  Cues for upright cervical posture   Corner stretch/ Doorway stretch 3x 10 sec  Cues for technique   Supine trunk elongation stretch - overhead reach for thoracic extension 60 sec   No pillow, tactile cues for scapular retraction   Hooklying piriformis stretch, hamstring stretch  2x 20 sec      Longitudinal ER/IR bilateral  10x ea      Supine bridge        Supine hip flexor stretch off edge of plinth  2x 20 sec      Sidelying clamshell  20x      SLR  20x                    Hydrohip bilateral and single leg  20x Level 5 x Red wedge and pillow    Nautilus knee ext  20x 25 lb x Too difficult, decrease next time   Nautilus knee flexion  20x 50 lb x    Leg press /Toe press Seat 4  20x 60 lb x    Nautilus seated row  20x 30 lb x    Nautilus Lat pull down  20x 30 lb x           REASSESSMENT: see tinetti balance assessment   x    REASSESSMENT: zqou-ss-ghlyath measurement   x 7 cm - no change   REASSESSMENT: see goals for strength assessed   x               Activity/Treatment Tolerance:  [x]  Patient tolerated treatment well  []  Patient limited by fatigue  []  Patient limited by pain   []  Patient limited by medical complications  []  Other: Assessment: Patient has been progressing well with physical therapy. He initially presented to clinic with diagnosis of kyphosis of thoracic region, but he also has chronic lower back pain and history of left leg weakness for which we have worked together in the past. Patient wanted to address all these concerns with physical therapy so we started his treatment with pectoral stretching, thoracic extension exercises, as well as hip flexibility and lumbar stabilization program. His back pain is very minimal at this point and he is performing his HEP consistently. He was able to progress to resistance equipment with emphasis on high repetitions for improved endurance of the trunk extensors and hips to stabilize the lumbar region. Patient plans to continue use of resistance equipment at the [employer] as well as performing aquatic therapy exercises such as ambulation and leg strengthening. Overall there has not been significant change in his kyphosis deformity, but patient demonstrates improved functional ability including lifting moderate weight, standing and walking longer durations, and improved standing balance. GOALS:  Patient Goal: decrease back pain, walk more upright    Long Term Goals:  Time Frame: 12 weeks    Patient will squat to lift 10 lb weight from floor to standing with good body mechanics in order to perform household tasks and place items in grocery cart. [x] Goal Met [] Goal Not Met [] Continue Goal [x] Discontinue Goal  [] Revise Goal  Goal Assessment:  Good squat mechanics, and also able to rise from low seat with no UE support    Patient will improve LLE strength to 4+/5 in order to squat, lunge, and navigate steps without difficulty.    [x] Goal Met [] Goal Not Met [] Continue Goal [x] Discontinue Goal  [] Revise Goal Goal Assessment:  5/5 strength for leg raises all directions, knee extension and flexion. Left ankle/peroneals still weak from baseline history of nerve impingement for past few years. Patient will decrease zroq-do-hwepxsy measurement from baseline 7cm to less than 4 cm in order to maintain upright thoracic trunk alignment. [] Goal Met [x] Goal Not Met [] Continue Goal [x] Discontinue Goal  [] Revise Goal  Goal Assessment: measurement remains 7cm from esze-fs-gckponc. Still demonstrates kyphotic posture in standing and walking. Patient Education:   []  HEP/Education Completed: Plan of Care, Goals, Posterior tilt standing against wall, hooklying bridge, hip flexor off edge, piriformis stretch, longitudinal hip ER/IR, sidelying clamshell   Medbridge Access Code: [email or web address]    []  No new Education completed  [x]  Reviewed Prior HEP - provided handout of his resistance equipment exercise grid      [x]  Patient verbalized and/or demonstrated understanding of education provided. []  Patient unable to verbalize and/or demonstrate understanding of education provided. Will continue education.   []  Barriers to learning: none    PLAN:    [x]  Discharge    Time In 1350   Time Out 1435   Timed Code Minutes: 45   Total Treatment Time: 45       Electronically Signed by: Paul Nathan

## 2021-04-16 NOTE — PROGRESS NOTES
0    omeprazole (PRILOSEC) 20 MG capsule Take 1 capsule by mouth every morning (before breakfast). omeprazole (Prilosec) 30 min. ac breakfast. 90 capsule 3    Multiple Vitamins-Iron (MULTIPLE VITAMIN/IRON PO) Take  by mouth daily. No current facility-administered medications for this visit. No Known Allergies  Health Maintenance   Topic Date Due    DTaP/Tdap/Td vaccine (1 - Tdap) Never done    Pneumococcal 65+ years Vaccine (2 of 2 - PPSV23) 09/05/1995    Shingles Vaccine (2 of 3) 08/20/2015    Annual Wellness Visit (AWV)  Never done    Flu vaccine  Completed    COVID-19 Vaccine  Completed    Hepatitis A vaccine  Aged Out    Hepatitis B vaccine  Aged Out    Hib vaccine  Aged Out    Meningococcal (ACWY) vaccine  Aged Out       Subjective:  Review of Systems   Constitutional: Positive for diaphoresis. HENT: Negative for ear pain and nosebleeds. Eyes: Negative for photophobia. Respiratory: Positive for shortness of breath. Negative for chest tightness. Cardiovascular: Negative for chest pain, palpitations and leg swelling. Gastrointestinal: Negative for abdominal distention, abdominal pain, anal bleeding, blood in stool, constipation, diarrhea, nausea, rectal pain and vomiting. Endocrine: Negative for polyphagia. Genitourinary: Negative for dysuria and hematuria. Musculoskeletal: Positive for arthralgias and back pain. Negative for gait problem, joint swelling, myalgias, neck pain and neck stiffness. Neurological: Negative for dizziness, syncope and light-headedness. Psychiatric/Behavioral: Negative for confusion, decreased concentration, dysphoric mood, hallucinations and self-injury. The patient is not nervous/anxious and is not hyperactive. Objective:  Physical Exam  HENT:      Head: Normocephalic.       Right Ear: Tympanic membrane normal.      Nose: Nose normal.      Mouth/Throat:      Mouth: Mucous membranes are moist.   Eyes:      Pupils: Pupils are equal, round, and reactive to light. Neck:      Musculoskeletal: Normal range of motion. Cardiovascular:      Rate and Rhythm: Normal rate and regular rhythm. Heart sounds: Murmur present. Pulmonary:      Effort: No respiratory distress. Breath sounds: No stridor. No wheezing, rhonchi or rales. Chest:      Chest wall: No tenderness. Abdominal:      General: There is no distension. Palpations: There is no mass. Tenderness: There is no abdominal tenderness. There is no right CVA tenderness, left CVA tenderness, guarding or rebound. Hernia: No hernia is present. Musculoskeletal:         General: No swelling, tenderness, deformity or signs of injury. Right lower leg: No edema. Left lower leg: No edema. Skin:     Coloration: Skin is not jaundiced or pale. Findings: No bruising, erythema, lesion or rash. Neurological:      Mental Status: He is alert and oriented to person, place, and time. Cranial Nerves: No cranial nerve deficit. Sensory: No sensory deficit. Motor: No weakness. Coordination: Coordination normal.      Gait: Gait normal.      Deep Tendon Reflexes: Reflexes normal.   Psychiatric:         Mood and Affect: Mood normal.         Thought Content: Thought content normal.       /75   Ht 5' 9\" (1.753 m)   Wt 135 lb (61.2 kg)   BMI 19.94 kg/m²     Assessment:      Diagnosis Orders   1. Nonrheumatic aortic valve stenosis     2. SOB (shortness of breath)     3. Dilated cardiomyopathy (HCC)     worsening AS and worsening dyspnea  Now with CHF and low EF   No known CAD  No underlying CAD   Does have early CHF     Plan:  No follow-ups on file.   Discussed  Needs a TAVR   Will need a cath before  Will be a high risk   Start low dose ACEI and low dose diuretics  Try to introduce beta blockers after the CHF is under control   Refer to Laura Milton   Continue risk factor modification and medical management  Thank you for allowing me to participate in the care of your patient. Please don't hesitate to contact me regarding any further issues related to the patient care    Orders Placed:  No orders of the defined types were placed in this encounter. Medications Prescribed:  No orders of the defined types were placed in this encounter. Discussed use, benefit, and side effects of prescribed medications. All patient questions answered. Pt voicedunderstanding. Instructed to continue current medications, diet and exercise. Continue risk factor modification and medical management. Patient agreed with treatment plan. Follow up as directed.     Electronically signedby Vee Bourgeois MD on 4/16/2021 at 2:26 PM

## 2021-04-16 NOTE — PATIENT INSTRUCTIONS
Start taking Lisinopril 2.5mg; Take one tablet by mouth once a day    Start taking Furosemide 20mg;  Take one tablet by mouth once a day

## 2021-04-20 NOTE — TELEPHONE ENCOUNTER
Orders received from Dr. Kingsley Pineda to schedule this patient for a diagnostic cath with Dr. Tomas Fitzpatrick, CBC and BMP prior, TAVR CTA, CHF consult for optimization, US carotid. CHF: 5/4/2021  CATH: 5/10/2021 at 1500 with an arrival at 1300.   US: 4/29/2021 at 1100  CTA: 4/29/2021 at 1300

## 2021-04-20 NOTE — PROGRESS NOTES
[address] [facility] [place] [address]  Dept: [phone]  Dept Fax: [phone]  Loc: [phone]    Visit Date: 4/20/2021    Mr. Yue Ocampo is a 80 y.o. male  who presented for:  Chief Complaint   Patient presents with    New Patient     referral from Dr. 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 Has had issues with aortic valve for many years. [facility], then [facility] Dr. Karlene Fields. He has been progressively more and more dyspneic over the last couple years. No chest pain, no syncope. No palpitations. [facility] checked TTE and found that he previously had a normal EF, but no 4/8/21 TTE shows EF dropped to 25%, LETY 0.5 cm2, and mean gradient 35 mmHG, with mild MR. Saw Dr. Jeni Nguyen. ECG shows NSR in 2018. HR 70-80s. He has a daughter who is a pHD as well. He is very functional, had some false teeth. No malignancies. He is on ASA. He has noted that the same activities have resulted in worsening of symptoms. No diaphoresis. He has noted worsening LE edema with weight gain as well.         Current Outpatient Medications:     lisinopril (PRINIVIL;ZESTRIL) 2.5 MG tablet, Take 1 tablet by mouth daily, Disp: 90 tablet, Rfl: 1    furosemide (LASIX) 20 MG tablet, Take 1 tablet by mouth daily, Disp: 90 tablet, Rfl: 1    fluticasone (FLONASE) 50 MCG/ACT nasal spray, , Disp: , Rfl:     B Complex-C (SUPER B COMPLEX PO), Take 1 tablet by mouth three times a week, Disp: , Rfl:     MAGNESIUM CITRATE PO, Take 250 mg by mouth 2 times daily, Disp: , Rfl:     Omega-3 Fatty Acids (FISH OIL PO), Take by mouth Fish oil 600 mg. + Omega 3 600 mg+Vitamin D 2000 units 1 tablet daily, Disp: , Rfl:     aspirin 81 MG tablet, Take 1 tablet by mouth See Admin Instructions 3 x weekly, Disp: 1 tablet, Rfl: 0    omeprazole (PRILOSEC) 20 MG capsule, Take 1 04/20/21 136 lb (61.7 kg)   04/16/21 135 lb (61.2 kg)   09/18/20 138 lb (62.6 kg)     BP Readings from Last 3 Encounters:   04/20/21 100/67   04/16/21 121/75   09/18/20 114/68       Nursing note and vitals reviewed. Physical Exam   Constitutional: Oriented to person, place, and time. Appears well-developed and well-nourished. HENT:   Head: Normocephalic and atraumatic. Eyes: EOM are normal. Pupils are equal, round, and reactive to light. Neck: Normal range of motion. Neck supple. No JVD present. Cardiovascular: Normal rate, regular rhythm, normal heart sounds and intact distal pulses. 3/6 JEANETTE at the 2nd ARACELI. Pulmonary/Chest: Effort normal and breath sounds normal. No respiratory distress. No wheezes. No rales. Abdominal: Soft. Bowel sounds are normal. No distension. There is no tenderness. Musculoskeletal: Normal range of motion. 3-4+ LE edema. Neurological: Alert and oriented to person, place, and time. No cranial nerve deficit. Coordination normal.   Skin: Skin is warm and dry. Psychiatric: Normal mood and affect.        No results found for: CKTOTAL, CKMB, CKMBINDEX    No results found for: WBC, RBC, HGB, HCT, MCV, MCH, MCHC, RDW, PLT, MPV    Lab Results   Component Value Date    LABALBU 4.6 11/21/2011       Lab Results   Component Value Date    ALKPHOS 72 11/21/2011    ALT 19 11/21/2011    AST 22 11/21/2011    PROT 7.4 11/21/2011    BILITOT 1.3 10/08/2018    BILIDIR 0.4 11/21/2011    LABALBU 4.6 11/21/2011       No results found for: MG    No results found for: INR, PROTIME      No results found for: LABA1C    No results found for: TRIG, HDL, LDLCALC, LDLDIRECT, LABVLDL    Lab Results   Component Value Date    TSH 0.949 10/08/2018         Testing Reviewed:      I have individually reviewed the cardiac test below:    ECHO:   Results for orders placed during the hospital encounter of 04/08/21   Echocardiogram complete    Narrative Transthoracic Echocardiography Report (TTE) Demographics      Patient Name   Sandra Posadas Gender              Male                  G      MR #           [id number]       Race                                                     Ethnicity      Account #      [de-identified]       Room Number      Accession      [id number]      Date of Study       04/08/2021   Number      Date of Birth  09/05/1930      Referring Physician Deni Santillan MD      Age            80 year(s)      Sonographer         Tate Spence, [facility],                                                      T                                     Interpreting        Echo reader of the week                                  Physician           Enedelia Boyd MD     Procedure    Type of Study      TTE procedure:ECHOCARDIOGRAM COMPLETE 2D W DOPPLER W COLOR. Procedure Date  Date: 04/08/2021 Start: 04:00 PM    Study Location: Echo Lab  Technical Quality: Good visualization    Indications: Aortic stenosis. Additional Medical History:former smoker, GERD, anemia, arthritis    Patient Status: Routine    Height: 69 inches Weight: 138 pounds BSA: 1.76 m^2 BMI: 20.38 kg/m^2    BP: 114/68 mmHg     Conclusions      Summary   Ejection fraction is visually estimated at 25%. There was severe global hypokinesis of the left ventricle. The aortic valve leaflets were not well visualized. Aortic valve appears tricuspid. Thickened aortic valve leaflets noted. Aortic valve leaflets are Moderately calcified. The maximum aortic valve gradient is 56 mmHg, the mean gradient is 35   mmHg, and the peak velocity is 364 cm/s. There is severe aortic stenosis with valve area of 0.5 sq cm. Calcification of the mitral valve noted. Myxomatotic degeneration of mitral valve. Mild mitral regurgitation is present.    results discussed with PCP      Signature      ----------------------------------------------------------------   Electronically signed by Enedelia Boyd MD (Interpreting   physician) on 04/08/2021 at 04:47 PM   ----------------------------------------------------------------      Findings      Mitral Valve   Calcification of the mitral valve noted. Myxomatotic degeneration of mitral valve. Mild mitral regurgitation is present. Aortic Valve   The aortic valve leaflets were not well visualized. Aortic valve appears tricuspid. Thickened aortic valve leaflets noted. Aortic valve leaflets are Moderately calcified. The maximum aortic valve gradient is 56 mmHg, the mean gradient is 35   mmHg, and the peak velocity is 364 cm/s. There is severe aortic stenosis with valve area of 0.5 sq cm. Tricuspid Valve   Mild-to-moderate tricuspid regurgitation. Pulmonic Valve   The pulmonic valve was not well visualized . Trivial pulmonic regurgitation visualized. Left Atrium   Mildly dilated left atrium. Left Ventricle   Ejection fraction is visually estimated at 25%. There was severe global hypokinesis of the left ventricle. Right Atrium   Right atrial size was normal.      Right Ventricle   Right ventricular systolic pressure of 60 mm Hg consistent with severe   pulmonary hypertension. Pericardial Effusion   The pericardium was normal in appearance with no evidence of a pericardial   effusion. Pleural Effusion   No evidence of pleural effusion. Aorta / Great Vessels   The IVC is dilated .      M-Mode/2D Measurements & Calculations      LV Diastolic   LV Systolic Dimension: 4.6 AV Cusp Separation: 0.5 cmLA   Dimension: 5.1 cm                         Dimension: 4.5 cmAO Root   cm             LV Volume Diastolic: 677.8 Dimension: 3.3 cmLA Area: 30.2   LV FS:9.8 %    ml                         cm^2   LV PW          LV Volume Systolic: 382.5   Diastolic: 0.9 ml   cm             LV EDV/LV EDV Index: 232.6   Septum         ml/132 m^2LV ESV/LV ESV    RV Diastolic Dimension: 3.7 cm   Diastolic: 1   Index: 302.7 ml/103 m^2   cm             EF Calculated: 22.3 % surgery and also understands the need for TAVR CTA and [facility] +/- PCI. The patient is FULL CODE. The POA is listed in the chart. We will schedule the above as appropriate. Once complete and appropriate based on the findings, a procedure date will be aligned at [facility], and we will notify the patient of further instructions. We had a long discussion with myself, family, patient, and structural heart coordinators. The family and patient were explained the risks/benefits/alternatives of the procedure, how the procedure works, the work-up, post-procedural care, and all of the possible complications of the procedure, including, but not limited to vascular injury, debilitating stroke, need for permanent pacemaker, and death. The patient/family would like to pursue TAVR at our facility and we will work towards this goal.         The patient and family understand that should there be any findings or issues that arise during the evaluation that would preclude TAVR, that this will need to be evaluated prior to proceeding with a percutaneous approach. Further, a unified heart team approach will be performed prior to proceeding with TAVR which includes the patient's primary care givers, cardiologist, and the entire structural heart team (interventionalist, CT surgeons, structural heart NP). The patient and family appeared to understand everything, all of their questions were answered to their satisfaction and they are agreeable to proceed with further evaluation for TAVR. Patient will need CHF optimization, diuretics, wrap legs, CHF clinic, possible LifeVest based on his wishes, and initiation of GDMT. Will d/w Dr. Randal Teran. Thank you for allowing us to participate in the care of this patient. Please do not hesitate to call us with questions.        Disposition:  TAVR    Electronically signed by Claudette Specter, MD   4/20/2021 at 12:44 PM EDT

## 2021-04-21 NOTE — PROGRESS NOTES
mg by mouth 2 times daily, Disp: , Rfl:     Omega-3 Fatty Acids (FISH OIL PO), Take by mouth Fish oil 600 mg. + Omega 3 600 mg+Vitamin D 2000 units 1 tablet daily, Disp: , Rfl:     aspirin 81 MG tablet, Take 1 tablet by mouth See Admin Instructions 3 x weekly, Disp: 1 tablet, Rfl: 0    omeprazole (PRILOSEC) 20 MG capsule, Take 1 capsule by mouth every morning (before breakfast). omeprazole (Prilosec) 30 min. ac breakfast., Disp: 90 capsule, Rfl: 3    Multiple Vitamins-Iron (MULTIPLE VITAMIN/IRON PO), Take  by mouth daily. , Disp: , Rfl:     Family History: This patient's family history includes Other in his father and sister; Stroke in his mother. Social History:  Ann Sinha  reports that he quit smoking about 36 years ago. His smoking use included cigarettes, pipe, and cigars. He started smoking about 71 years ago. He quit after 0.00 years of use. He has never used smokeless tobacco. He reports current alcohol use. He reports that he does not use drugs. Imaging:  See HPI    ROS:   Constitutional: Positive for activity change and fatigue. Negative for chills, fever and unexpected weight change. Respiratory: See HPI. Negative for apnea,wheezing and stridor. Cardiovascular: See HPI. Negative for chest pain or palpitations  Gastrointestinal: Negative for hematochezia, melana, constipation, and N/V/D. Musculoskeletal: Negative for myalgias  Skin: Negative for color change, rash and wound. Neurological: Negative for dizziness or syncope. Vital Signs:   /75 (Site: Right Wrist, Position: Sitting, Cuff Size: Small Adult) Comment (Site): Per patient request  Pulse 78   Ht 5' 9\" (1.753 m)   Wt 138 lb (62.6 kg)   BMI 20.38 kg/m²     Physical Exam:  General appearance:  No acute distress, appears stated age and cooperative. Neck: No jugular venous distention. Trachea midline. No carotid bruits. Respiratory:  Normal respiratory effort.  Clear to auscultation, bilaterally without Rales/Wheezes/Rhonch. Cardiovascular:  Regular rate and rhythm with normal S1/S2 with 3/6 JEANETTE. Abdomen: Soft, non-tender, non-distended with normal bowel sounds. Ext: No clubbing, cyanosis or edema bilaterally. Full range of motion without deformity. Skin: Skin color, texture, turgor normal.  No rashes or lesions. Neurologic:  Neurovascularly intact without any focal sensory/motor deficits. Psychiatric:  Alert and oriented, thought content appropriate, normal insight. 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 Severe Symptomatic Aortic Stenosis    Plan: 4/21/21  3 80y.o. year-old male with severe symptomatic aortic stenosis. Patient would clearly benefit from TAVR for reasons of age, frailty, STS score, and other co-morbidities. Recommend proceed with evaluation for TAVR.     The plan of care was discussed in detail with Dr. Molly Montoya

## 2021-04-22 NOTE — PATIENT INSTRUCTIONS
You may receive a survey regarding the care you received during your visit. Your input is valuable to us. We encourage you to complete and return your survey. We hope you will choose us in the future for your healthcare needs. Continue:  · Continue current medications  · Daily weights and record  · Fluid restriction of 2 Liters per day  · Limit sodium in diet to around 1779-2345 mg/day  · Monitor BP  · Activity as tolerated     Call the Heart Failure Clinic for any of the following symptoms: [phone]   Weight gain of 2-3 pounds in 1 day or 5 pounds in 1 week   Increased shortness of breath   Shortness of breath while laying down   Cough   Chest pain   Swelling in feet, ankles or legs   Tenderness or bloating in the abdomen   Fatigue    Decreased appetite or nausea    Confusion       Take Furosemide 2 tabs tomorrow AM and 1 tab in PM x 3 days. THEN take 1 tab AM and 1 tab PM     Eat Banana daily    Call on Monday w/ update.

## 2021-04-22 NOTE — PROGRESS NOTES
of 60 mm Hg consistent with severe   pulmonary hypertension. Pericardial Effusion   The pericardium was normal in appearance with no evidence of a pericardial   effusion. Pleural Effusion   No evidence of pleural effusion. Aorta / Great Vessels   The IVC is dilated . [facility] at [facility] 11/2018 =  EF 55-60%, mild/mod AS, trace MR, RVSP 30-40 mmHg    CATH/STRESS:   NONE    Results reviewed:  BNP: No results found for: BNP  CBC:   Lab Results   Component Value Date    WBC 6.7 04/21/2021    RBC 3.79 04/21/2021    HGB 11.5 04/21/2021    HCT 36.9 04/21/2021     04/21/2021     CMP:    Lab Results   Component Value Date     04/21/2021    K 4.4 04/21/2021     04/21/2021    CO2 25 04/21/2021    BUN 27 04/21/2021    CREATININE 0.9 04/21/2021    LABGLOM 79 04/21/2021    GLUCOSE 114 04/21/2021    CALCIUM 9.2 04/21/2021     Hepatic Function Panel:    Lab Results   Component Value Date    ALKPHOS 72 11/21/2011    ALT 19 11/21/2011    AST 22 11/21/2011    PROT 7.4 11/21/2011    BILITOT 1.3 10/08/2018    BILIDIR 0.4 11/21/2011    LABALBU 4.6 11/21/2011     Magnesium:  No results found for: MG  PT/INR:  No results found for: PROTIME, INR  Lipids:  No results found for: TRIG, HDL, LDLCALC, LDLDIRECT, LABVLDL@    ASSESSMENT AND PLAN:   The patient's condition/symptoms are Stable. Diagnosis Orders   1. CHF (congestive heart failure), NYHA class III, chronic, systolic (HCC)  Brain Natriuretic Peptide    Brain Natriuretic Peptide   2. Nonrheumatic aortic valve stenosis  Brain Natriuretic Peptide    Brain Natriuretic Peptide   3. Bilateral leg edema     4.  LECHUGA (dyspnea on exertion)       Continue:  GDMT:   ACE/ARB/ARNI - Lisinopril 2.5/day   BB - none   Diuretic - Lasix 20/day - Increase 40 AM, 20 PM   AA - Add Aldactone 12.5/day  SGLT2 -  no  Vasodilator - no  Continue Current medications  Systolic NYHA III - EF 81% (new)  Recently started on Lisinopril and Lasix 20/day  BPs run on low Felicia - no teach back method, and is agreeable to the treatment plan.        Electronically signed by ZUHAIR Navarro CNP on 4/22/2021 at 10:43 PM

## 2021-04-29 NOTE — PATIENT INSTRUCTIONS
You may receive a survey regarding the care you received during your visit. Your input is valuable to us. We encourage you to complete and return your survey. We hope you will choose us in the future for your healthcare needs. Continue:  · Continue current medications  · Daily weights and record  · Fluid restriction of 2 Liters per day  · Limit sodium in diet to around 1288-1069 mg/day  · Monitor BP  · Activity as tolerated     Call the Heart Failure Clinic for any of the following symptoms: [phone]   Weight gain of 2-3 pounds in 1 day or 5 pounds in 1 week   Increased shortness of breath   Shortness of breath while laying down   Cough   Chest pain   Swelling in feet, ankles or legs   Tenderness or bloating in the abdomen   Fatigue    Decreased appetite or nausea    Confusion       Take Furosemide 40 mg (2 tablets) THREE times tomorrow     THEN decrease to 40 mg TWICE times daily from then on.

## 2021-04-29 NOTE — PROGRESS NOTES
Heart Failure Clinic       Visit Date: 4/29/2021  Cardiologist:  Dr. Lashonda Toscano = Dr Abhay June  Primary Care Physician: Dr. Arely Rea MD    Surya Coe is a 80 y.o. male who presents today for:  Chief Complaint   Patient presents with    Congestive Heart Failure     TAVR       HPI:   Surya Coe is a 80 y.o. male who presents to the office for a follow up patient visit in the heart failure clinic. Accompanied by no one  , lives w/ wife, Kevenbry Steiners  Retired     TYPE HF: Systolic (EF 80% - new), Severe AS (0.5 sq cm, mean gradient 35mmHg)    Cause: ?? Device: none - declined lifevest  HX: HTN     Had ECHOs yearly at [place] - known mod AS (was due for yearly ECHO this July however LECHUGA and swelling got worse - PCP ordered ECHO then referred to Cardio)     Dry Wt:  140??     Hospitalization:  None  [facility] 4/16 - started on Lasix/Lisinopril     Concerns today: same - no improvement in swelling or SOB w/ increasing Lasix from 20/day to 40 AM, 20 PM  Urinates a lot in AM but not much from then on. Not sleeping well. Still LECHUGA maybe little worse. States had to sit down after walking 5 steps into house. Wheelchair again today  Did not get compression stockings/wraps = not sure understands what/where - little language barrier   Diet: Watching salt and fluid now. Not wearing Lifevest = states \"it was too much\", implying the whole apparatus. Tried put on but difficult.      Patient has:  Chest Pain: no  SOB: yes  Orthopnea/PND: yes  NISA: ni  Edema: yes  Fatigue: yes  Abdominal bloating: yes  Cough: no  Appetite: good  Any extra diuretic use: see HPI  Weight gain: same  Home weight: not checking  Home blood pressure: not checking    Past Medical History:   Diagnosis Date    Arthritis     Cataract     GERD (gastroesophageal reflux disease)     Senile osteoporosis      Past Surgical History:   Procedure Laterality Date    COLONOSCOPY  2010    EYE SURGERY  1995    cataracts    NASAL SEPTUM SURGERY Left 2009     Family History   Problem Relation Age of Onset    Stroke Mother    [facility] Other Father         old age   [facility] Other Sister         Cardiac arrest     Social History     Tobacco Use    Smoking status: Former Smoker     Years: 0.00     Types: Cigarettes, Pipe, Cigars     Start date: 1950     Quit date: 1985     Years since quittin.3    Smokeless tobacco: Never Used   Substance Use Topics    Alcohol use: Yes     Comment: rarely     Current Outpatient Medications   Medication Sig Dispense Refill    furosemide (LASIX) 20 MG tablet Take 2 tablets by mouth 2 times daily 60 tablet 3    spironolactone (ALDACTONE) 25 MG tablet Take 1 tablet by mouth daily 45 tablet 3    lisinopril (PRINIVIL;ZESTRIL) 2.5 MG tablet Take 1 tablet by mouth daily 90 tablet 1    fluticasone (FLONASE) 50 MCG/ACT nasal spray       B Complex-C (SUPER B COMPLEX PO) Take 1 tablet by mouth three times a week      MAGNESIUM CITRATE PO Take 250 mg by mouth 2 times daily      Omega-3 Fatty Acids (FISH OIL PO) Take by mouth Fish oil 600 mg. + Omega 3 600 mg+Vitamin D 2000 units 1 tablet daily      aspirin 81 MG tablet Take 1 tablet by mouth See Admin Instructions 3 x weekly 1 tablet 0    omeprazole (PRILOSEC) 20 MG capsule Take 1 capsule by mouth every morning (before breakfast). omeprazole (Prilosec) 30 min. ac breakfast. 90 capsule 3    Multiple Vitamins-Iron (MULTIPLE VITAMIN/IRON PO) Take  by mouth daily. No current facility-administered medications for this visit. No Known Allergies    SUBJECTIVE:   Review of Systems   Constitutional: Positive for fatigue. Negative for activity change and appetite change. Respiratory: Positive for shortness of breath. Negative for cough. Cardiovascular: Positive for leg swelling. Negative for chest pain and palpitations. Gastrointestinal: Positive for abdominal distention. Neurological: Negative for weakness, light-headedness and headaches. Hematological: Negative for adenopathy. Psychiatric/Behavioral: Negative for sleep disturbance. OBJECTIVE:   Today's Vitals:  /84   Pulse 83   Ht 5' 9\" (1.753 m)   Wt 156 lb 12.8 oz (71.1 kg)   SpO2 93%   BMI 23.16 kg/m²     Physical Exam  Vitals signs reviewed. Constitutional:       General: He is not in acute distress. Appearance: Normal appearance. He is well-developed. He is not diaphoretic. HENT:      Head: Normocephalic and atraumatic. Eyes:      Conjunctiva/sclera: Conjunctivae normal.   Neck:      Musculoskeletal: Normal range of motion and neck supple. Comments: +left JVD  Cardiovascular:      Rate and Rhythm: Normal rate and regular rhythm. Heart sounds: Murmur present. Pulmonary:      Effort: Pulmonary effort is normal. No respiratory distress. Breath sounds: Normal breath sounds. No wheezing or rales. Abdominal:      General: Bowel sounds are normal. There is no distension. Palpations: Abdomen is soft. Tenderness: There is no abdominal tenderness. Musculoskeletal: Normal range of motion. Right lower leg: Edema (3+ pitting) present. Left lower leg: Edema (3+ pitting = mild erythema - no increased warmth or pain) present. Skin:     General: Skin is warm and dry. Capillary Refill: Capillary refill takes less than 2 seconds. Neurological:      Mental Status: He is alert and oriented to person, place, and time. Coordination: Coordination normal.   Psychiatric:         Behavior: Behavior normal.         Wt Readings from Last 3 Encounters:   04/29/21 156 lb 12.8 oz (71.1 kg)   04/22/21 155 lb (70.3 kg)   04/21/21 138 lb (62.6 kg)     BP Readings from Last 3 Encounters:   04/29/21 118/84   04/22/21 (!) 100/50   04/21/21 112/75     Pulse Readings from Last 3 Encounters:   04/29/21 83   04/22/21 88   04/21/21 78     Body mass index is 23.16 kg/m².     ECHO:    Conclusions      Summary   Ejection fraction is visually estimated at 25%.   There was severe global hypokinesis of the left ventricle.   The aortic valve leaflets were not well visualized.   Aortic valve appears tricuspid.   Thickened aortic valve leaflets noted.   Aortic valve leaflets are Moderately calcified.   The maximum aortic valve gradient is 56 mmHg, the mean gradient is 35   mmHg, and the peak velocity is 364 cm/s.   There is severe aortic stenosis with valve area of 0.5 sq cm.   Calcification of the mitral valve noted.   Myxomatotic degeneration of mitral valve.   Mild mitral regurgitation is present.   results discussed with PCP      Signature      ----------------------------------------------------------------   Electronically signed by Dalton Hinton MD (Interpreting   physician) on 04/08/2021 at 04:47 PM   ----------------------------------------------------------------      Findings      Mitral Valve   Calcification of the mitral valve noted.   Myxomatotic degeneration of mitral valve.   Mild mitral regurgitation is present.      Aortic Valve   The aortic valve leaflets were not well visualized.   Aortic valve appears tricuspid.   Thickened aortic valve leaflets noted.   Aortic valve leaflets are Moderately calcified.   The maximum aortic valve gradient is 56 mmHg, the mean gradient is 35   mmHg, and the peak velocity is 364 cm/s.   There is severe aortic stenosis with valve area of 0.5 sq cm.      Tricuspid Valve   Mild-to-moderate tricuspid regurgitation.      Pulmonic Valve   The pulmonic valve was not well visualized .   Trivial pulmonic regurgitation visualized.      Left Atrium   Mildly dilated left atrium.      Left Ventricle   Ejection fraction is visually estimated at 25%.   There was severe global hypokinesis of the left ventricle.      Right Atrium   Right atrial size was normal.      Right Ventricle   Right ventricular systolic pressure of 60 mm Hg consistent with severe   pulmonary hypertension.      Pericardial Effusion   The pericardium was normal in appearance with no evidence of a pericardial   effusion.      Pleural Effusion   No evidence of pleural effusion.      Aorta / Great Vessels   The IVC is dilated .     ECHO at [address] 11/2018 =  EF 55-60%, mild/mod AS, trace MR, RVSP 30-40 mmHg     CATH/STRESS:   NONE - scheduled for next week      Results reviewed:  BNP: No results found for: BNP  CBC:   Lab Results   Component Value Date    WBC 6.7 04/21/2021    RBC 3.79 04/21/2021    HGB 11.5 04/21/2021    HCT 36.9 04/21/2021     04/21/2021     CMP:    Lab Results   Component Value Date     04/29/2021    K 4.7 04/29/2021     04/29/2021    CO2 25 04/29/2021    BUN 48 04/29/2021    CREATININE 1.2 04/29/2021    LABGLOM 57 04/29/2021    GLUCOSE 99 04/29/2021    CALCIUM 9.0 04/29/2021     Hepatic Function Panel:    Lab Results   Component Value Date    ALKPHOS 72 11/21/2011    ALT 19 11/21/2011    AST 22 11/21/2011    PROT 7.4 11/21/2011    BILITOT 1.3 10/08/2018    BILIDIR 0.4 11/21/2011    LABALBU 4.6 11/21/2011     Magnesium:  No results found for: MG  PT/INR:  No results found for: PROTIME, INR  Lipids:  No results found for: TRIG, HDL, LDLCALC, LDLDIRECT, LABVLDL    ASSESSMENT AND PLAN:   The patient's condition/symptoms are Stable      Diagnosis Orders   1. CHF (congestive heart failure), NYHA class III, chronic, systolic (HCC)  Basic Metabolic Panel   2. Nonrheumatic aortic valve stenosis     3. Bilateral leg edema     4. LECHUGA (dyspnea on exertion)     5. Fatigue, unspecified type       Continue:  GDMT:   ACE/ARB/ARNI - Lisinopril 2.5/day   BB - none   Diuretic - Lasix 40 am, 20 PM = increase 40 BID  AA - Aldacatone 12.5/day - increase 25/day  SGLT2 -  No    Vasodilator - no  Continue Current medications  Stable but significant Hypervolemia = 3+ pitting edema, orthopnea, LECHUGA, +JVD  Lasix 80 mg IV today. Kcl 40 po today  Increase Lasis to 40 BID  Increase Aldactone 25/day  Take Lasix 40 TID tomorrow.   Continue banana daily  Will call for f/u

## 2021-04-29 NOTE — TELEPHONE ENCOUNTER
Patient has refused to wear the life vest and states that it is to complicated and will just except the risk of sudden death.  Education given but still insist on not wearing the vest.

## 2021-05-03 NOTE — TELEPHONE ENCOUNTER
Reviewed CT scan - Large right pleural effusion, moderate left. US w/ thoracentesis ordered  Today or tomorrow if able.   Have him get repeat BMP while here

## 2021-05-03 NOTE — TELEPHONE ENCOUNTER
Patient notified   Regarding Thoracentesis   Patient states he stopped his ASA 2 wks ago    Called to Radiology scheduling   They are full today but will check with radiologist to see if they can add on today

## 2021-05-04 NOTE — TELEPHONE ENCOUNTER
Has had increasing Lasix dose d/t fluid overload in addition to IV Lasix last week - had thoracentesis yesterday (1.3L off right)  Seeing pt this Thursday will repeat BMP - if creatinine still elevating will hold/decrease Lasix in prep for cath

## 2021-05-04 NOTE — TELEPHONE ENCOUNTER
PAT/JUDAH lmovm asking if pt should come in for prehydration as his creatinine went from . 9 to 1.4 as he is having a cath with jackelin on monday?

## 2021-05-06 PROBLEM — R06.02 SHORTNESS OF BREATH: Status: ACTIVE | Noted: 2021-01-01

## 2021-05-06 NOTE — ED NOTES
Bed: 023A  Expected date: 5/6/21  Expected time: 5:50 PM  Means of arrival: [facility]  Comments:     Ange Ramos RN  05/06/21 [id number]

## 2021-05-06 NOTE — ED PROVIDER NOTES
[facility] ENCOUNTER        Pt Name: Giovani Mercado  MRN: [id number]  [place] 9/5/1930  Date of evaluation: 5/6/2021  Treating Resident Physician: Brandon MD  Supervising Physician: [id number]       Chief Complaint   Patient presents with    Chest Pain    Shortness of Breath     History obtained from the patient. HISTORY OF PRESENT ILLNESS    HPI  Giovani Mercado is a 80 y.o. male who presents to the emergency department for evaluation of palpitations and shortness of breath. This began approximately 5 PM this evening. No known history of atrial fibrillation, full-strength aspirin given by EMS. Saturating well. Heart rate on presentation variable between 130 and 180, A. fib RVR. Patient feels at his baseline except for the sensation of palpitations. He does not have any current dizziness. Says he does feel a little bit weak but denies any nausea. Fellow member at bedside states that the patient did have an episode similar to this approximately 3 days ago. She said that he was laying on the ground with severe nausea and vomited once. This spontaneously resolved over the course of the evening. Concern that this could be another atrial fibrillation that his disease is paroxysmal in nature. The patient has no other acute complaints at this time. REVIEW OF SYSTEMS   Review of Systems   Constitutional: Negative for chills and fever. HENT: Negative for postnasal drip, rhinorrhea, sinus pressure, sinus pain, sore throat and trouble swallowing. Eyes: Negative for pain and visual disturbance. Respiratory: Positive for chest tightness and shortness of breath. Negative for cough and wheezing. Cardiovascular: Positive for palpitations and leg swelling. Negative for chest pain. Gastrointestinal: Negative for abdominal pain, constipation, diarrhea and vomiting.    Genitourinary: Negative for difficulty urinating, dysuria and flank pain. Musculoskeletal: Negative for arthralgias, back pain, neck pain and neck stiffness. Skin: Negative for color change and rash. Neurological: Negative for dizziness, weakness, light-headedness, numbness and headaches.          PAST MEDICAL AND SURGICAL HISTORY     Past Medical History:   Diagnosis Date    Acute on chronic congestive heart failure (HCC)     Arthritis     Cataract     GERD (gastroesophageal reflux disease)     Senile osteoporosis      Past Surgical History:   Procedure Laterality Date    COLONOSCOPY  2010    EYE SURGERY  1995    cataracts    NASAL SEPTUM SURGERY Left 2009         MEDICATIONS     Current Facility-Administered Medications:     fluticasone (FLONASE) 50 MCG/ACT nasal spray 2 spray, 2 spray, Each Nostril, Daily, April Donato MD, 2 spray at 05/07/21 0852    [Held by provider] spironolactone (ALDACTONE) tablet 25 mg, 25 mg, Oral, Daily, Lindsay Dumont MD    [Held by provider] lisinopril (PRINIVIL;ZESTRIL) tablet 2.5 mg, 2.5 mg, Oral, Daily, April Donato MD    sodium chloride flush 0.9 % injection 10 mL, 10 mL, Intravenous, 2 times per day, Lindsay Dumont MD, 10 mL at 05/07/21 0848    sodium chloride flush 0.9 % injection 10 mL, 10 mL, Intravenous, PRN, April Donato MD    0.9 % sodium chloride infusion, 25 mL, Intravenous, PRN, April Donato MD    acetaminophen (TYLENOL) tablet 650 mg, 650 mg, Oral, Q6H PRN **OR** acetaminophen (TYLENOL) suppository 650 mg, 650 mg, Rectal, Q6H PRN, April Donato MD    melatonin tablet 4.5 mg, 4.5 mg, Oral, Nightly PRN, April Donato MD, 4.5 mg at 05/07/21 0224    pantoprazole (PROTONIX) tablet 40 mg, 40 mg, Oral, QAM AC, April Donato MD, 40 mg at 05/07/21 0624    ipratropium (ATROVENT) 0.02 % nebulizer solution 0.5 mg, 0.5 mg, Nebulization, 4x daily, April Donato MD, 0.5 mg at 05/07/21 1255    hydrOXYzine (ATARAX) tablet 25 mg, 25 mg, dextrose 5% 250 mL (premix) infusion, 5-30 Units/kg/hr, Intravenous, Continuous, Tri Siemens, DO, Last Rate: 9.2 mL/hr at 21 0530, 15 Units/kg/hr at 21 05    [] amiodarone (NEXTERONE) 360 mg in dextrose 5% 200 ml, 1 mg/min, Intravenous, Continuous, Last Rate: 33.3 mL/hr at 21, 1 mg/min at 21 **FOLLOWED BY** amiodarone (NEXTERONE) 360 mg in dextrose 5% 200 ml, 0.5 mg/min, Intravenous, Continuous, Tri Siemens, DO, Last Rate: 16.7 mL/hr at 21 1345, 0.5 mg/min at 21 134      SOCIAL HISTORY     Social History     Social History Narrative    Not on file     Social History     Tobacco Use    Smoking status: Former Smoker     Years: 0.00     Types: Cigarettes, Pipe, Cigars     Start date: 1950     Quit date: 1985     Years since quittin.3    Smokeless tobacco: Never Used   Substance Use Topics    Alcohol use: Yes     Comment: rarely    Drug use: No         ALLERGIES   No Known Allergies      FAMILY HISTORY     Family History   Problem Relation Age of Onset    Stroke Mother     Other Father         old age    Other Sister         Cardiac arrest         PREVIOUS RECORDS   Previous records reviewed: This is this patient's first visit to [facility] ED, no previous records available on EMR. .      PHYSICAL EXAM     ED Triage Vitals [21 1808]   BP Temp Temp Source Pulse Resp SpO2 Height Weight   112/61 97.5 °F (36.4 °C) Oral 155 30 100 % 5' 9\" (1.753 m) 135 lb (61.2 kg)     Initial vital signs and nursing assessment reviewed and  Normal except for A. fib RVR. Patient is not tachypneic during my assessment. . Body mass index is 21.87 kg/m². Pulsoximetry is normal per my interpretation. Additional Vital Signs:  Vitals:    21 1515   BP:    Pulse: 99   Resp: 13   Temp:    SpO2: 100%       Physical Exam  Constitutional:       General: He is not in acute distress. Appearance: Normal appearance. He is not ill-appearing or diaphoretic.    HENT: Head: Normocephalic and atraumatic. Mouth/Throat:      Mouth: Mucous membranes are moist.      Pharynx: Oropharynx is clear. No oropharyngeal exudate or posterior oropharyngeal erythema. Eyes:      Extraocular Movements: Extraocular movements intact. Conjunctiva/sclera: Conjunctivae normal.      Pupils: Pupils are equal, round, and reactive to light. Neck:      Musculoskeletal: Normal range of motion. No neck rigidity or muscular tenderness. Cardiovascular:      Rate and Rhythm: Tachycardia present. Rhythm irregular. Pulses: Normal pulses. Heart sounds: Normal heart sounds. No murmur. No friction rub. No gallop. Pulmonary:      Effort: Pulmonary effort is normal. No respiratory distress. Breath sounds: Decreased breath sounds and rales present. No wheezing or rhonchi. Abdominal:      Palpations: Abdomen is soft. Tenderness: There is no abdominal tenderness. There is no guarding or rebound. Musculoskeletal:         General: No swelling. Right lower leg: Edema present. Left lower leg: Edema present. Skin:     General: Skin is warm and dry. Capillary Refill: Capillary refill takes less than 2 seconds. Coloration: Skin is not cyanotic. Findings: No bruising, erythema, lesion or rash. Neurological:      General: No focal deficit present. Mental Status: He is alert and oriented to person, place, and time. Sensory: No sensory deficit. MEDICAL DECISION MAKING   Initial Assessment:   57-year-old male presenting in A. fib RVR. Borderline hypotensive, no current chest pain. Scheduled for TAVR with Dr. Lindy Benz. No anticoagulation. Concern for development of cardiogenic shock and sepsis. Clinical picture appears to be volume overload secondary to poor output due to valvular stenosis. Plan:   ACS evaluation including EKG.   Gentle fluid bolus, will not be able to significantly raise BP secondary to his aortic stenosis  Conservative diltiazem to avoid hypotension. Likely disposition is admission for further work-up and evaluation by cardiology.     ED RESULTS   Laboratory results:  Labs Reviewed   CBC WITH AUTO DIFFERENTIAL - Abnormal; Notable for the following components:       Result Value    WBC 11.2 (*)     RBC 3.75 (*)     Hemoglobin 11.7 (*)     Hematocrit 38.0 (*)     .3 (*)     MCHC 30.8 (*)     RDW-CV 22.8 (*)     RDW-SD 86.5 (*)     Segs Absolute 9.4 (*)     Lymphocytes Absolute 0.7 (*)     Immature Grans (Abs) 0.13 (*)     All other components within normal limits   TROPONIN - Abnormal; Notable for the following components:    Troponin T 0.337 (*)     All other components within normal limits   BRAIN NATRIURETIC PEPTIDE - Abnormal; Notable for the following components:    Pro-BNP 05953.0 (*)     All other components within normal limits   MAGNESIUM - Abnormal; Notable for the following components:    Magnesium 2.5 (*)     All other components within normal limits   BASIC METABOLIC PANEL - Abnormal; Notable for the following components:    CO2 22 (*)     Glucose 151 (*)     BUN 40 (*)     CREATININE 1.3 (*)     All other components within normal limits   GLOMERULAR FILTRATION RATE, ESTIMATED - Abnormal; Notable for the following components:    Est, Glom Filt Rate 52 (*)     All other components within normal limits   APTT - Abnormal; Notable for the following components:    aPTT 130.9 (*)     All other components within normal limits   BASIC METABOLIC PANEL - Abnormal; Notable for the following components:    CO2 20 (*)     Glucose 133 (*)     BUN 45 (*)     CREATININE 1.5 (*)     All other components within normal limits   MAGNESIUM - Abnormal; Notable for the following components:    Magnesium 2.5 (*)     All other components within normal limits   PHOSPHORUS - Abnormal; Notable for the following components:    Phosphorus 5.6 (*)     All other components within normal limits   GLOMERULAR FILTRATION RATE, ESTIMATED - Abnormal; Notable for the following components:    Est, Glom Filt Rate 44 (*)     All other components within normal limits   TROPONIN - Abnormal; Notable for the following components:    Troponin T 0.906 (*)     All other components within normal limits   TROPONIN - Abnormal; Notable for the following components:    Troponin T 0.790 (*)     All other components within normal limits   CBC WITH AUTO DIFFERENTIAL - Abnormal; Notable for the following components:    RBC 3.95 (*)     Hemoglobin 12.0 (*)     Hematocrit 38.0 (*)     MCV 96.2 (*)     MCHC 31.6 (*)     RDW-CV 22.9 (*)     RDW-SD 82.9 (*)     Immature Grans (Abs) 0.09 (*)     All other components within normal limits   APTT - Abnormal; Notable for the following components:    aPTT 91.2 (*)     All other components within normal limits   PROCALCITONIN - Abnormal; Notable for the following components:    Procalcitonin 0.11 (*)     All other components within normal limits   URINE WITH REFLEXED MICRO - Abnormal; Notable for the following components:    Ketones, Urine TRACE (*)     Protein, UA 30 (*)     Color, UA DK YELLOW (*)     All other components within normal limits   APTT - Abnormal; Notable for the following components:    aPTT 71.7 (*)     All other components within normal limits   BASIC METABOLIC PANEL W/ REFLEX TO MG FOR LOW K - Abnormal; Notable for the following components:    CO2 20 (*)     Glucose 124 (*)     BUN 53 (*)     CREATININE 1.9 (*)     All other components within normal limits   CBC - Abnormal; Notable for the following components:    RBC 4.12 (*)     Hemoglobin 12.5 (*)     Hematocrit 39.3 (*)     MCV 95.4 (*)     MCHC 31.8 (*)     RDW-CV 23.1 (*)     RDW-SD 82.0 (*)     All other components within normal limits   PROTIME-INR - Abnormal; Notable for the following components:    INR 1.63 (*)     All other components within normal limits   ANION GAP - Abnormal; Notable for the following components:    Anion Gap 18.0 (*)     All other components within normal limits   GLOMERULAR FILTRATION RATE, ESTIMATED - Abnormal; Notable for the following components:    Est, Glom Filt Rate 33 (*)     All other components within normal limits   COVID-19, RAPID   CULTURE, BLOOD 1    Narrative:     Source: blood-Adult-suboptimal <5.5oz./set volume       Site: (single bottle)Peripheral;                            Current Antibiotics: not stated   CULTURE, BLOOD 2    Narrative:     Source: blood-Adult-suboptimal <5.5oz./set volume       Site: (single bottle)Peripheral;                            Current Antibiotics: not stated   PROCALCITONIN   ANION GAP   OSMOLALITY   LACTATE, SEPSIS   APTT   SCAN OF BLOOD SMEAR   LACTATE, SEPSIS   TSH WITH REFLEX   ANION GAP   HEMOGLOBIN A1C   SCAN OF BLOOD SMEAR   TYPE AND SCREEN       Radiologic studies results:  XR CHEST PORTABLE   Final Result   1. Mild cardiomegaly. ET tube tip 6 cm proximal to saad. NG tube passes into the stomach. A left subclavian sheath is present and projects superior to the left clavicular head, possibly passing into the left internal jugular vein. 2. Severe degenerative changes both shoulders. Large bore chest tube projects over the pleural space right side. 3. Moderate-sized pleural effusion right side. Small effusion left side. Moderate superimposed bibasilar atelectasis/pneumonia. 4. Overall appearance of chest has worsened somewhat from earlier. **This report has been created using voice recognition software. It may contain minor errors which are inherent in voice recognition technology. **      Final report electronically signed by Dr. Porfirio Flores on 5/7/2021 4:12 PM      XR CHEST PORTABLE   Final Result   Impression:   Stable portable chest with opacities in bilateral lung bases. Cardiomegaly present. This document has been electronically signed by: Zadie Heimlich, MD on    05/07/2021 02:46 AM      XR CHEST PORTABLE   Final Result   Impression:   1.   Moderate to large right pleural effusion, increased. Superimposed    scattered patchy opacities of the right lung, may reflect pulmonary edema,    atelectasis or infection. 2.  Small left pleural effusion, improved. 3.  Otherwise unchanged. This document has been electronically signed by:  Sanjay Navas MD on 05/06/2021    07:22 PM          ED Medications administered this visit:   Medications   heparin (porcine) injection 4,900 Units (has no administration in time range)   heparin (porcine) injection 2,450 Units (has no administration in time range)   heparin 25,000 units in dextrose 5% 250 mL (premix) infusion (15 Units/kg/hr × 61.2 kg Intravenous Restarted 5/7/21 0530)   amiodarone (NEXTERONE) 360 mg in dextrose 5% 200 ml (1 mg/min Intravenous New Bag 5/6/21 2052)     Followed by   amiodarone (NEXTERONE) 360 mg in dextrose 5% 200 ml (0.5 mg/min Intravenous New Bag 5/7/21 1345)   fluticasone (FLONASE) 50 MCG/ACT nasal spray 2 spray (2 sprays Each Nostril Given 5/7/21 0852)   spironolactone (ALDACTONE) tablet 25 mg ( Oral Automatically Held 5/10/21 0900)   lisinopril (PRINIVIL;ZESTRIL) tablet 2.5 mg ( Oral Automatically Held 5/10/21 0900)   sodium chloride flush 0.9 % injection 10 mL (10 mLs Intravenous Given 5/7/21 0848)   sodium chloride flush 0.9 % injection 10 mL (has no administration in time range)   0.9 % sodium chloride infusion (has no administration in time range)   acetaminophen (TYLENOL) tablet 650 mg (has no administration in time range)     Or   acetaminophen (TYLENOL) suppository 650 mg (has no administration in time range)   melatonin tablet 4.5 mg (4.5 mg Oral Given 5/7/21 0224)   pantoprazole (PROTONIX) tablet 40 mg (40 mg Oral Given 5/7/21 0624)   ipratropium (ATROVENT) 0.02 % nebulizer solution 0.5 mg (0.5 mg Nebulization Given 5/7/21 1255)   hydrOXYzine (ATARAX) tablet 25 mg (25 mg Oral Given 5/7/21 0224)   furosemide (LASIX) injection 20 mg (20 mg Intravenous Not Given 5/7/21 4295)   calcium acetate (PHOSLO) tablet 1,334 mg (1,334 mg Oral Given 5/7/21 0946)   midodrine (PROAMATINE) tablet 10 mg (has no administration in time range)   guaiFENesin (MUCINEX) extended release tablet 600 mg (600 mg Oral Not Given 5/7/21 1324)   0.9 % sodium chloride infusion (has no administration in time range)   aspirin tablet 325 mg (has no administration in time range)   diphenhydrAMINE (BENADRYL) injection 50 mg (has no administration in time range)   hydrocortisone sodium succinate PF (SOLU-CORTEF) injection 200 mg (has no administration in time range)   nitroGLYCERIN (NITROSTAT) SL tablet 0.4 mg (has no administration in time range)   dexmedetomidine (PRECEDEX) 400 mcg in sodium chloride 0.9 % 100 mL infusion (has no administration in time range)   fentaNYL (SUBLIMAZE) 1250 mcg in sodium chloride 0.9 % 250 mL (has no administration in time range)   chlorhexidine (PERIDEX) 0.12 % solution 15 mL (has no administration in time range)   norepinephrine-sodium chloride (LEVOPHED) 16-0.9 MG/250ML-% infusion (has no administration in time range)   phenylephrine (ANA-SYNEPHRINE) 50 mg in dextrose 5 % 250 mL infusion (50 mcg/min Intravenous New Bag 5/7/21 1625)   norepinephrine (LEVOPHED) 16 mg in sodium chloride 0.9 % 250 mL infusion (has no administration in time range)   dilTIAZem injection 10 mg (5 mg Intravenous Given 5/6/21 1826)   dilTIAZem injection 5 mg (5 mg Intravenous Given 5/6/21 1832)   heparin (porcine) injection 4,900 Units (4,900 Units Intravenous Given 5/6/21 2058)   acetaminophen (TYLENOL) tablet 650 mg (650 mg Oral Given 5/6/21 2309)   lidocaine 4 % external patch 1 patch (1 patch Transdermal Patch Removed 5/7/21 1045)   0.9 % sodium chloride bolus (0 mLs Intravenous Stopped 5/7/21 1347)   midodrine (PROAMATINE) tablet 10 mg (10 mg Oral Given 5/7/21 1045)   ketamine (KETALAR) 50 MG/5ML injection (100 mg  Given 5/7/21 1503)   fentaNYL (SUBLIMAZE) 100 MCG/2ML injection (100 mcg  Given 5/7/21 7918)   midazolam (VERSED)

## 2021-05-06 NOTE — ED TRIAGE NOTES
Patient presents via EMS to ER with complaints of right sided chest pain and shortness of breath that started around 1700 today. EKG obtained and given to provider upon arrival. Patient reports no known history of Atrial fibrilation. 324 ASA given by EMS.  Respirations 28-32, Spo2 100% on 4L O2 NC.

## 2021-05-06 NOTE — LETTER
your Medicare rights and benefits wont change because your health care provider is participating in [address]. Medicare will keep covering all of your medically necessary services. Even though Medicare will pay your doctor in a different way under [employer], how much you have to pay wont change. Health care providers and suppliers who are enrolled in Medicare will submit their Medicare claims like they always have. Youll have all the same Medicare rights and protections, including the right to choose which hospital, doctor, or other health care provider you see. If you dont want to get care from a health care provider whos participating in [address] [place], then youll have to choose a different health care provider whos not participating in the Model. How can I give feedback about my health care? Medicare might ask you to take a voluntary survey about the services and care you received from [facility] during your hospital stay or outpatient procedure and for a specific period of time afterwards. You can decide whether you want to take the voluntary survey, but if you do, itll help Medicare make [employer] and the care of other Medicare patients better. If you have concerns or complaints about your care, you can:   · Talk to your doctor or health care provider. · Contact your Beneficiary and Family Centered Care Quality Improvement   Organization [facility]). You can get your [employer]s phone number  at  Medicare.gov/contacts or by calling 1-800-MEDICARE. TTY users can call  7-451.557.8784. Where can I learn more about [employer]? Learn more about [employer] at https://innovation.cms.gov/initiatives/bpci-advanced/:  · A list of all the hospitals and physician group practices in the country participating in [address] [place] [address]. · All of the inpatient and outpatient Clinical Episodes that are currently included under [employer].  A Clinical Episode is a grouping of medical conditions or diagnoses that are included in the [id number] [place] [address] Avenue.

## 2021-05-06 NOTE — PATIENT INSTRUCTIONS
You may receive a survey regarding the care you received during your visit. Your input is valuable to us. We encourage you to complete and return your survey. We hope you will choose us in the future for your healthcare needs.     Continue:  · Continue current medications  · Daily weights and record  · Fluid restriction of 2 Liters per day  · Limit sodium in diet to around 2390-7994 mg/day  · Monitor BP  · Activity as tolerated     Call the [facility] for any of the following symptoms: [phone]   Weight gain of 2-3 pounds in 1 day or 5 pounds in 1 week   Increased shortness of breath   Shortness of breath while laying down   Cough   Chest pain   Swelling in feet, ankles or legs   Tenderness or bloating in the abdomen   Fatigue    Decreased appetite or nausea    Confusion     CONTINUE Furosemide the same =  2 pills AM - 2 pills Evening Thursday, Friday and Saturday    ON Sunday take Lasix to 2 tabs (40mg) AM, 1 tab (20 mg) in Evening

## 2021-05-06 NOTE — PROGRESS NOTES
Heart Failure Clinic       Visit Date: 5/6/2021  Cardiologist:  Dr. Gallo January = Dr Wen Maxwell  Primary Care Physician: Dr. Kassi Yeh MD    Joy Pal is a 80 y.o. male who presents today for:  Chief Complaint   Patient presents with    Congestive Heart Failure       HPI:   Joy Pal is a 80 y.o. male who presents to the office for a follow up patient visit in the heart failure clinic. Accompanied by no one  , lives w/ wife, Josphine Stephanie  Retired     TYPE HF: Systolic (EF 89% - new), Severe AS (0.5 sq cm, mean gradient 35mmHg)    Cause: ?? Device: none - declined lifevest  HX: HTN     Had ECHOs yearly at [place] - known mod AS (was due for yearly ECHO this July however LECHUGA and swelling got worse - PCP ordered ECHO then referred to Cardio)     Dry Wt:  140??     Hospitalization:  None  [facility] 4/16 - started on Lasix/Lisinopril   OV 4/29 -Lasix 80 IV given, increase Lasix 40 BID  4/29 - CTA done   5/3 - R Thoracentesis - 1300ml/off    Concerns today: Feeling 20% improved. Breathing better. Has even been able to go up flight of stairs (last week 5 steps caused significant SOB). LE edema is improved = wearing Tubigrips today. Wt is down 9# in past week. Watching diet/fluid intake closely.      Patient has:  Chest Pain: no  SOB: yes  Orthopnea/PND: yes  NISA: no  Edema: yes - improving  Fatigue: yes  Abdominal bloating: no  Cough: no  Appetite: good  Any extra diuretic use: no  Weight gain: same  Home weight: not checking  Home blood pressure: not checking    Past Medical History:   Diagnosis Date    Arthritis     Cataract     GERD (gastroesophageal reflux disease)     Senile osteoporosis      Past Surgical History:   Procedure Laterality Date    COLONOSCOPY  2010    EYE SURGERY  1995    cataracts    NASAL SEPTUM SURGERY Left 2009     Family History   Problem Relation Age of Onset    Stroke Mother    Mendel Reaper Other Father         old age   Mendel Reaper Other Sister         Cardiac arrest Social History     Tobacco Use    Smoking status: Former Smoker     Years: 0.00     Types: Cigarettes, Pipe, Cigars     Start date: 1950     Quit date: 1985     Years since quittin.3    Smokeless tobacco: Never Used   Substance Use Topics    Alcohol use: Yes     Comment: rarely     Current Outpatient Medications   Medication Sig Dispense Refill    furosemide (LASIX) 20 MG tablet Take 2 tablets by mouth daily AND 1 tablet every evening. 60 tablet 3    spironolactone (ALDACTONE) 25 MG tablet Take 1 tablet by mouth daily 45 tablet 3    lisinopril (PRINIVIL;ZESTRIL) 2.5 MG tablet Take 1 tablet by mouth daily 90 tablet 1    fluticasone (FLONASE) 50 MCG/ACT nasal spray       B Complex-C (SUPER B COMPLEX PO) Take 1 tablet by mouth three times a week      MAGNESIUM CITRATE PO Take 250 mg by mouth 2 times daily      Omega-3 Fatty Acids (FISH OIL PO) Take by mouth Fish oil 600 mg. + Omega 3 600 mg+Vitamin D 2000 units 1 tablet daily      omeprazole (PRILOSEC) 20 MG capsule Take 1 capsule by mouth every morning (before breakfast). omeprazole (Prilosec) 30 min. ac breakfast. 90 capsule 3    Multiple Vitamins-Iron (MULTIPLE VITAMIN/IRON PO) Take  by mouth daily. No current facility-administered medications for this visit. No Known Allergies    SUBJECTIVE:   Review of Systems   Constitutional: Positive for fatigue. Negative for activity change and appetite change. Respiratory: Positive for shortness of breath. Negative for cough. Cardiovascular: Positive for leg swelling. Negative for chest pain and palpitations. Gastrointestinal: Positive for abdominal distention. Neurological: Negative for weakness, light-headedness and headaches. Hematological: Negative for adenopathy. Psychiatric/Behavioral: Negative for sleep disturbance.        OBJECTIVE:   Today's Vitals:  BP 90/60   Pulse 83   Ht 5' 9\" (1.753 m)   Wt 147 lb (66.7 kg)   SpO2 98%   BMI 21.71 kg/m²     Physical Exam  Vitals signs reviewed. Constitutional:       General: He is not in acute distress. Appearance: Normal appearance. He is well-developed. He is not diaphoretic. HENT:      Head: Normocephalic and atraumatic. Eyes:      Conjunctiva/sclera: Conjunctivae normal.   Neck:      Musculoskeletal: Normal range of motion and neck supple. Cardiovascular:      Rate and Rhythm: Normal rate and regular rhythm. Heart sounds: Murmur present. Pulmonary:      Effort: Pulmonary effort is normal. No respiratory distress. Breath sounds: Normal breath sounds. No wheezing or rales. Very slight diminished on R lower base  Abdominal:      General: Bowel sounds are normal. There is no distension. Palpations: Abdomen is soft. Tenderness: There is no abdominal tenderness. Musculoskeletal: Normal range of motion. Right lower leg: Edema (1+ pitting - ankle) present. Left lower leg: Edema (1-2+ pitting - knee down) present. Skin:     General: Skin is warm and dry. Capillary Refill: Capillary refill takes less than 2 seconds. Neurological:      Mental Status: He is alert and oriented to person, place, and time. Coordination: Coordination normal.   Psychiatric:         Behavior: Behavior normal.         Wt Readings from Last 3 Encounters:   05/06/21 147 lb (66.7 kg)   04/29/21 156 lb 12.8 oz (71.1 kg)   04/22/21 155 lb (70.3 kg)     BP Readings from Last 3 Encounters:   05/06/21 90/60   04/29/21 118/84   04/22/21 (!) 100/50     Pulse Readings from Last 3 Encounters:   05/06/21 83   04/29/21 83   04/22/21 88     Body mass index is 21.71 kg/m².     ECHO:    Conclusions      Summary   Ejection fraction is visually estimated at 25%.   There was severe global hypokinesis of the left ventricle.   The aortic valve leaflets were not well visualized.   Aortic valve appears tricuspid.   Thickened aortic valve leaflets noted.   Aortic valve leaflets are Moderately calcified.   The maximum mmHg        CATH/STRESS:   NONE - scheduled for next week      Results reviewed:  BNP: No results found for: BNP  CBC:   Lab Results   Component Value Date    WBC 6.7 04/21/2021    RBC 3.79 04/21/2021    HGB 11.5 04/21/2021    HCT 36.9 04/21/2021     04/21/2021     CMP:    Lab Results   Component Value Date     05/06/2021    K 4.6 05/06/2021     05/06/2021    CO2 28 05/06/2021    BUN 38 05/06/2021    CREATININE 1.2 05/06/2021    LABGLOM 57 05/06/2021    GLUCOSE 112 05/06/2021    CALCIUM 9.3 05/06/2021     Hepatic Function Panel:    Lab Results   Component Value Date    ALKPHOS 72 11/21/2011    ALT 19 11/21/2011    AST 22 11/21/2011    PROT 7.4 11/21/2011    BILITOT 1.3 10/08/2018    BILIDIR 0.4 11/21/2011    LABALBU 4.6 11/21/2011     Magnesium:  No results found for: MG  PT/INR:  No results found for: PROTIME, INR  Lipids:  No results found for: TRIG, HDL, LDLCALC, LDLDIRECT, LABVLDL    ASSESSMENT AND PLAN:   The patient's condition/symptoms are Stable      Diagnosis Orders   1. CHF (congestive heart failure), NYHA class III, chronic, systolic (HCC)  Brain Natriuretic Peptide    Basic Metabolic Panel   2. Pleural effusion, right     3. Nonrheumatic aortic valve stenosis     4. Bilateral leg edema     5. LECHUGA (dyspnea on exertion)     6. Fatigue, unspecified type       Continue:  GDMT:   ACE/ARB/ARNI - Lisinopril 2.5/day   BB - none   Diuretic - Lasix 40 BID  AA - Aldacatone 25/day  SGLT2 -  No    Vasodilator - no  Continue Current medications  Stable - some improvement - decrease in LE edema, SOB/LECHUGA improved, wt down 9#. BMP, BNP today   Scheduled for LHC/RHC next week  Slight bump in creat over past 2 weeks w/ diuresis - will back off if needed for cath  Continue Tubigrips on LE   Continue diet/fluid adherence. Continue TAVR work up   [employer] in past - understands risks of SCA  F/U in clinic in 3 weeks.      ADDENDUM:  Labs stable - creat 1.2, GFR 57 (improved from 1.4, 48)  Continue Lasix 40 BID   Decrease Sunday evening - only take 1 tablet Lasix in prep for cath Monday. Tolerating above noted HF meds, no ill side effects noted. Will continue to monitor kidney function and electrolytes. Will optimize as tolerated. Pt is compliant w/ medications. Total visit time of 40 minutes has been spent with patient on education of symptoms, management, medication, and plan of care; as well as review of chart: labs, ECHO, radiology reports, etc.   I personally spent more then 50% of the appt time face to face with the patient. · Daily weights  · Fluid restriction of 2 Liters per day  · Limit sodium in diet to around 9401-1494 mg/day  · Monitor BP  · Activity as tolerated     Patient was instructed to call the [address] for any changes in symptoms as noted in AVS.      Return in about 3 weeks (around 5/27/2021). or sooner if needed     Patient given educational materials - see patient instructions. We discussed the importance of weighing oneself and recording daily. We also discussed the importance of a low sodium diet, higher sodium foods to avoid and better low sodium food options. Patient verbalizes understanding of plan of care using teach back method, and is agreeable to the treatment plan.        Electronically signed by ZUHAIR Mae CNP on 5/6/2021 at 1:38 PM

## 2021-05-07 NOTE — FLOWSHEET NOTE
05/07/21 0749   Provider Notification   Reason for Communication Evaluate   Provider Name Dr. Ranjit Angel   Provider Notification Physician   Method of Communication Secure Message   Notification Time [id number]     New consult that needs to go to Dr. Lora Bradshaw re: Severe AS (scheduled for TAVR 5/10) / new Afib RVR. Perfect serve not allowing me to send to Dr. Lora Bradshaw. Dr. Lora Bradshaw was called and will see patient today.

## 2021-05-07 NOTE — PROGRESS NOTES
Pt transported to cath lab on the vent and FIO2 was increased to 100% on the vent and resp. Rate to 16 for the procedure.

## 2021-05-07 NOTE — ED NOTES
ED to inpatient nurses report    Chief Complaint   Patient presents with    Chest Pain    Shortness of Breath      Present to ED from home  LOC: alert and orientated to name, place, date  Vital signs   Vitals:    05/06/21 2049 05/06/21 2148 05/06/21 2243 05/06/21 2300   BP: (!) 90/58 (!) 85/59 104/85 97/82   Pulse: 111 145  127   Resp: 16 18  16   Temp:       TempSrc:       SpO2: 100% 100%  100%   Weight:       Height:          Oxygen Baseline 2 L/min nasal cannula     Current needs required none Bipap/Cpap No  LDAs:   Peripheral IV 05/06/21 Left Antecubital (Active)       Peripheral IV 05/06/21 Right Forearm (Active)   Site Assessment Clean; Intact;Dry 05/06/21 1842   Line Status Blood return noted;Normal saline locked 05/06/21 1842   Dressing Status Clean;Dry; Intact 05/06/21 1842     Mobility: Requires assistance * 1  Pending ED orders: None  Present condition: Stable     Electronically signed by Jennifer Osborn RN on 5/6/2021 at 11:11 PM       Jennifer Osborn RN  05/06/21 6262

## 2021-05-07 NOTE — FLOWSHEET NOTE
05/07/21 1201   Provider Notification   Reason for Communication Review case   Provider Name Dr. [place] Christian   Provider Notification Physician   Method of Communication Secure Message   Notification Time [phone] called on behalf of Dr. Laura Milton and said that he wants him transferred to ICU. ICU has 2 discharges/transfers pending, and patient will be first on list to transfer. Transfer order is in.

## 2021-05-07 NOTE — PROGRESS NOTES
Pt admitted to  [phone] from ED. Complaints: Shortness of breath. IV none infusing into the antecubital right, condition patent and no redness at a rate of 33.3 mls/ hour with about 200 mls in the bag still. IV site free of s/s of infection or infiltration. Vital signs obtained. Assessment and data collection initiated. Two nurse skin assessment performed by Anjana Pritchett and Clarisa Cardoza. Oriented to room. Policies and procedures for 3B explained. Melissa Jernigan RN discussed hourly rounding with patient addressing 5 P's. Fall prevention and safety brochure discussed with patient. Bed alarm on. Call light in reach. The best day to schedule a follow up Dr appointment is:  Monday a.m. Explained patients right to have family, representative or physician notified of their admission. Patient has Requested for physician to be notified. Patient has Requested for family/representative to be notified. All questions answered with no further questions at this time.

## 2021-05-07 NOTE — CARE COORDINATION
5/7/21, 7:05 AM EDT  DISCHARGE PLANNING EVALUATION:    Ana Powers       Admitted: 5/6/2021/ [address] day: 1   Location: -21/021-A Reason for admit: Shortness of breath [R06.02]   PMH:  has a past medical history of Arthritis, Cataract, GERD (gastroesophageal reflux disease), and Senile osteoporosis. Procedure:   5/6 CXR - Moderate to large right pleural effusion, increased.  Superimposed scattered patchy opacities of the right lung, may reflect pulmonary edema, atelectasis or infection. 5/7 CXR - Stable portable chest with opacities in bilateral lung bases. Barriers to Discharge:  Admitted through ED with palpitations and SOB. Found to be in Afib with RVR, with rate up to 120's. Started Amiodarone gtt. Heparin gtt. O2 started at 4L/nc, now at 1L/nc. Sats %. BP low of 82/60. BNP [id number] and [id number]. Troponin 0.337. BUN 40 and 45. Creatinine 1.3 and 1.5. Cardiology consulted. Palliative Care consulted. PCP: Garnette Harada, MD  Readmission Risk Score: 15%    Patient Goals/Plan/Treatment Preferences: Spoke with pt and wife. They live at home together. They have been independent and both drive. Pt has a cane that he uses prn. Denies any HH needs at this time. Wife states that pt is scheduled for procedure with Dr. Vianca Staples on Monday 5/10/2021 (Cardiology has been consulted). Transportation/Food Security/Housekeeping Addressed:  No issues identified.

## 2021-05-07 NOTE — PROCEDURES
RECORD OF PROCEDURE    PATIENT NAME: Clarence Bourgeois  MEDICAL RECORD NO. [id number]  DATE: 5/7/2021  SURGEON: Lamar Bragg MD  PRIMARY CARE PHYSICIAN: Lisa Steven MD     PREOPERATIVE DIAGNOSIS:  Need for IV access, malpositioned central line   POSTOPERATIVE DIAGNOSIS:  Same  PROCEDURE PERFORMED:  Left Subclavian Vein Central Line Insertion  SURGEON:  Lamar Bragg        DISCUSSION:  Joni Dhillon is a 80y.o.-year-old male who requires additional IV access. After history and physical examination was performed, potential diagnostic and therapeutic modalities discussed with the patient, that was emergent and patient was aware prior to the first line placement. Line that was placed did not follow appropriate trajectory. Decision was made to replace. PROCEDURE:  The patient was placed in a supine position. The skin overlying the the previously placed left subclavian Cordis was prepped and draped in sterile fashion. The skin was infiltrated with local anesthetic. The sutures were divided in the previously Cordis was removed. Pressure was held there was no bleeding. Through a separate site,  the introducer needle was inserted into the subclavian vein returning dark red non pulsatile blood. A guidewire placed through the center of the needle with no resistance. A small incision made in the skin with a #11 scalpel blade. Introducer sheath placed over the top of the guidewire. The dilator was then removed and the 9 [place] cortis was passed, It was drawn and flushed. The triple lumen was then passed through the port. . . The catheter then secured using silk suture and sterile dressings applied. No immediate complication evident. All sponge, instrument and needle counts were correct at the completion of the procedure. Postprocedural chest x-ray showed good placement with no evidence of hemo or pneumothorax. Pt tolerated the procedure well with no immediate complication evident.      ANESTHESIA: Local    ESTIMATED BLOOD LOSS:  5 ml    COMPLICATIONS:  None immediately appreciated.     Electronically signed by Nena Benz MD on 5/7/2021 at 6:27 PM

## 2021-05-07 NOTE — PROGRESS NOTES
As long as MAPS are greater than 60, or pt. Become symptomatic, than cardioversion is needed. Per. Dr. Teddy Rosales.

## 2021-05-07 NOTE — ED NOTES
Pt resting in bed with call light in reach. Pt pulse ranges from 125-170 intermittently, Dr. Ethel Cabrera advised at this time. Dr. Benny Lo at this time. No distress noted, pt breaths easy and unlabored.        Mili, [address] [place] [address]  05/06/21 9138

## 2021-05-07 NOTE — PROGRESS NOTES
Consult received and updated by primary nurse. At this time, cardiology is seeing pt and planning on transferring him to ICU d/t hypotension and increase in troponin. He is scheduled for a cardiac cath. Palliative Care will be available for supportive measures as pt continues with aggressive treatment.

## 2021-05-07 NOTE — PROCEDURES
Central Venous Catheterization Procedure Note     Indication:  [x] Lack of adequate peripheral iv access   [] Infusion of medications with high risk of extravasation injury  [x] Hemodynamic monitoring    [] Hemodialysis  [] Extracorporeal therapies  [] Other:                     Time Out:  [x] Time out was completed immediately prior to the start of the procedure which included verification of the correct patient, correct site and agreement on the procedure to be done. [] Time out was not done because procedure was emergent      Consent:  [x] The patient/surrogate decision maker was informed of the procedure indications, risks, benefits and alternatives. Questions were answered and consent was obtained to proceed with the procedure. [] Consent was not obtained, because the procedure was emergent or patient was unable to consent and surrogate decision maker was not available. Type of Central Line Being Placed:   [x] Central venous    [] Hemodialysis  [] Pulmonary artery    Location:   [] Internal Jugular Vein [] Right [] Left  [x] Subclavian Vein  [] Right [x] Left  [] Femoral Vein   [] Right [] Left      Central Line Bundle:  [x] I washed/disinfected my hands prior to starting procedure  [x] Hat      [x] Mask      [x] Sterile gown      [x] Sterile gloves were worn throughout the procedure  [x] Everyone in the room during the procedure wore a mask  [x] Chlorhexidine was utilized to prep the skin and allowed to dry completely  [] Povidone-iodine was used to prep the skin and allowed to dry completely  [x] Full body drape was used to cover the patient    Ultrasound Guidance:   [] Yes      [x] No    Anesthetic Used:  [x] Lidocaine      [x] Other Patient sedated     Sterile Technique Used Throughout Procedure?   [x] Yes      [] No    Description/Findings:   [x] Dark nonpulsatile blood return obtained from all ports  [] Appropriate wavefom(s) seen    [x] Other    Complications:  [] None apparent  [] Other: See below    Follow-up x-ray:     Follow-up CXR revealed left subclavian sheath present project superior to the left clavicular head possibly passing into the left internal jugular vein. Procedure Performed By: Elyssa June MD    Assistant(s): If supervised: Dr. Xenia Urena MD was physically present and supervised, all key elements of this procedure. Attending attestation procedure went without complication post procedure chest x-ray demonstrated that the sheath was projecting in upper instructions towards the internal jugular vein.:  Catheter no longer being used plan for replacement.     Procedure wentElectronically signed by Elyssa June MD on 5/7/2021 at 3:38 PM

## 2021-05-07 NOTE — PRE SEDATION
further questions and wished to proceed; the consent form was signed. MEDICAL HISTORY  [x]ASHD/ANGINA/MI/CHF   [x]Hypertension  []Diabetes  [x]Hyperlipidemia  []Smoking  []Family Hx of ASHD  [x]Additional information:       has a past medical history of Acute on chronic congestive heart failure ([address] Arthritis, Cataract, GERD (gastroesophageal reflux disease), and Senile osteoporosis. SURGICAL HISTORY   has a past surgical history that includes Nasal septum surgery (Left, 2009); Colonoscopy (2010); and eye surgery (1995).   Additional information:       ALLERGIES   Allergies as of 05/06/2021    (No Known Allergies)     Additional information:       MEDICATIONS   Aspirin  [x] 81 mg  [] 325 mg  [] None  Antiplatelet drug therapy use last 5 days  [x]No []Yes  Coumadin Use Last 5 Days [x]No []Yes  Other anticoagulant use last 5 days  [x]No []Yes    Current Facility-Administered Medications:     fluticasone (FLONASE) 50 MCG/ACT nasal spray 2 spray, 2 spray, Each Nostril, Daily, April Donato MD, 2 spray at 05/07/21 0852    [Held by provider] spironolactone (ALDACTONE) tablet 25 mg, 25 mg, Oral, Daily, Jonn Michael MD    [Held by provider] lisinopril (PRINIVIL;ZESTRIL) tablet 2.5 mg, 2.5 mg, Oral, Daily, April Donato MD    sodium chloride flush 0.9 % injection 10 mL, 10 mL, Intravenous, 2 times per day, Jonn Michael MD, 10 mL at 05/07/21 0848    sodium chloride flush 0.9 % injection 10 mL, 10 mL, Intravenous, PRN, April Donato MD    0.9 % sodium chloride infusion, 25 mL, Intravenous, PRN, April Donato MD    acetaminophen (TYLENOL) tablet 650 mg, 650 mg, Oral, Q6H PRN **OR** acetaminophen (TYLENOL) suppository 650 mg, 650 mg, Rectal, Q6H PRN, April Donato MD    melatonin tablet 4.5 mg, 4.5 mg, Oral, Nightly PRN, April Donato MD, 4.5 mg at 05/07/21 0224    pantoprazole (PROTONIX) tablet 40 mg, 40 mg, Oral, QA AC, April Donato MD, 40 mg at 05/07/21 0624    ipratropium (ATROVENT) 0.02 % nebulizer solution 0.5 mg, 0.5 mg, Nebulization, 4x daily, April Donato MD, 0.5 mg at 05/07/21 1255    hydrOXYzine (ATARAX) tablet 25 mg, 25 mg, Oral, TID PRN, April Donato MD, 25 mg at 05/07/21 0224    furosemide (LASIX) injection 20 mg, 20 mg, Intravenous, Daily, April Donato MD    calcium acetate (PHOSLO) tablet 1,334 mg, 1,334 mg, Oral, TID , Saturnino Nguyen MD, 1,334 mg at 05/07/21 0946    midodrine (PROAMATINE) tablet 10 mg, 10 mg, Oral, TID , Moe Griffin MD    [facility]) extended release tablet 600 mg, 600 mg, Oral, BID, Saturnino Nguyen MD    0.9 % sodium chloride infusion, , Intravenous, Continuous, Reji Choe PA-C    hydrocortisone sodium succinate PF (SOLU-CORTEF) injection 200 mg, 200 mg, Intravenous, Once, Reji Choe PA-C    nitroGLYCERIN (NITROSTAT) SL tablet 0.4 mg, 0.4 mg, Sublingual, Q5 Min PRN, Reji Choe PA-C    dexmedetomidine (PRECEDEX) 400 mcg in sodium chloride 0.9 % 100 mL infusion, 0.2-1.4 mcg/kg/hr, Intravenous, Continuous, Doretha Desherry, APRN - CNP, Last Rate: 11.8 mL/hr at 05/07/21 1645, 0.7 mcg/kg/hr at 05/07/21 1645    fentaNYL (SUBLIMAZE) 1250 mcg in sodium chloride 0.9 % 250 mL, 12.5-200 mcg/hr, Intravenous, Continuous, Doretha Deiters, APRN - CNP    chlorhexidine (PERIDEX) 0.12 % solution 15 mL, 15 mL, Mouth/Throat, BID, Doretha Deiters, APRN - CNP    phenylephrine (ANA-SYNEPHRINE) 50 mg in dextrose 5 % 250 mL infusion,  mcg/min, Intravenous, Continuous, Rancho Evans MD, Last Rate: 15 mL/hr at 05/07/21 1625, 50 mcg/min at 05/07/21 1625    norepinephrine (LEVOPHED) 16 mg in sodium chloride 0.9 % 250 mL infusion, 2-100 mcg/min, Intravenous, Continuous, Rancho Evans MD, Last Rate: 23.4 mL/hr at 05/07/21 1643, 25 mcg/min at 05/07/21 1643    heparin (porcine) injection 4,900 Units, 80 Units/kg, Intravenous, PRN, Lukas Lennox, DO    heparin PHYSICAL:   General: Sedated/intubated  HEENT:  Unremarkable for age  Neck: without increased JVD, carotid pulses 2+ bilaterally without bruits  Heart: RRR, S1 & S2 WNL, S4 gallop, 3/6 JEANETTE   NYHA: 4  Lungs: Bilateral crackles  Abdomen: BS present, without HSM, masses, or tenderness    Extremities: without C,C, 1+ edema,  Pulses 2+ bilaterally  Mental Status: Alert & Oriented        PLANNED PROCEDURE   [x]Cath  [x]PCI                []Pacemaker/AICD  [x]JEMMA             [x]Cardioversion []Peripheral angiography/PTA  []Other:      SEDATION  Planned agent:[x]Midazolam []Meperidine [x]Sublimaze []Morphine  []Diazepam  []Other:     ASA Classification:  []1 []2 []3 [x]4 []5  Class 1: A normal healthy patient  Class 2: Pt with mild to moderate systemic disease  Class 3: Severe systemic disease or disturbance  Class 4: Severe systemic disorders that are already life threatening. Class 5: Moribund pt with little chances of survival, for more than 24 hours. Mallampati I Airway Classification:   []1 []2 []3 []4 -  intubated    [x]Pre-procedure diagnostic studies complete and results available. Comment:    [x]Previous sedation/anesthesia experiences assessed. Comment:    [x]The patient is an appropriate candidate to undergo the planned procedure sedation and anesthesia. (Refer to nursing sedation/analgesia documentation record)  [x]Formulation and discussion of sedation/procedure plan, risks, and expectations with patient and/or responsible adult completed. [x]Patient examined immediately prior to the procedure.  (Refer to nursing sedation/analgesia documentation record)    Santos Morales MD   Electronically signed 5/7/2021 at 5:12 PM

## 2021-05-07 NOTE — FLOWSHEET NOTE
05/07/21 0990   Provider Notification   Reason for Communication Evaluate   Provider Name Dr. Aldair Parsons   Provider Notification Physician   Method of Communication Secure Message   Notification Time [id number]     New consult re: TIFF, admitted for afib with rvr, elevated trop, clearance for possible cardiac cath. Dr. Aldair Parsons called back, new orders to increase midodrine to 10 mg TID. He will see.

## 2021-05-07 NOTE — H&P
History & Physical       Patient: Krupa Perez  YOB: 1930    MRN: [id number]     Acct: [de-identified]    PCP: Marie Pascual MD    Date of Admission: 5/6/2021    Date of Service: Patient seen / examined on 05/07/21 and admitted to inpatient with expected LOS greater than two midnights due to medical therapy. ASSESSMENT / PLAN:    New-onset Afib RVR  Duration of symptoms unclear. Mildly hypotensive but maintaining MAP > 60 and asymptomatic of CP. + Decompensated systolic CHF, severe AS awaiting TAVR 5/10/21. [id number] Score 4. Amiodarone / heparin gtts initiated in ED per on-call cardiologist's recommendation. Admit to cardiac stepdown unit. Continue amiodarone / heparin. Repeat TTE deferred (completed 4/2021). Cardiovert for MAP < 60 / clinical instability. Maintain telemetry. Severe symptomatic L-F/L-G AS (LETY 0.5 cm2, MG 35 mmHg; EF 25%) / mild MR  Scheduled for TAVR 5/10. Completed CTA chest 4/29. Diagnostic L/RHC scheduled. Tenuous volume status. ACE-I held. Dr. Sue Morales consulted. Decompensated systolic CHF / NYHA III (EF 25% per TTE 4/8/21)  CHF needs optimized. Lasix resumed with caution not to dramatically reduce preload / may require pressor support to counteract diuresis. ACE-I held (TIFF, reduces afterload). Aldactone held (TIFF). Would introduce BB following compensation. May benefit from [address]    ? TIFF vs CKD stage 3  Limited data prior to 4/2021. Suspicion for pre-renal injury with widened BUN:Cr ratio and decompensated CHF +/- ATN/hypotension contributing. Diuretics as above. ACE-I, aldactone held. Avoid additional nephrotoxins. Renally dose medications. BMP daily to monitor for improvement. Elevated troponin / type 2 NSTEMI  Denies CP. Lateral ST-depressions on EKG. Represents demand-ischemia, secondary to above. Will repeat if patient develops CP. Primary HTN  Currently with mild hypotension. ACE, aldactone held. Monitor BP. Hyperglycemia  Mild.  No documented hx DM (A1C 5.3 in 3/2018). Stress likely contributing. No indication for SSI (BG < 180). BMP, repeat A1C in the AM.    Leukocytosis  Mild. Reactive. Afebrile / no clinical signs/symptoms of infection. Trend. Chronic macrocytic anemia  No active bleeding assessed. Iron studies nml 9/2018. On outpatient vitamin B-12 supplementation / resumed. GERD  PPI resumed / consider substitution for H2 blocker if + hx B-12 deficiency. Arthritis / senile osteoporosis  Historical. No active therapies listed. Chief Complaint: palpitations, shortness of breath    History of Present Illness:  79 y/o M PMHx severe symptomatic LFLG AS (LETY 0.5 cm2, MG 35 mmHg), mild MR, NYHA III / systolic CHF (EF 45% per TTE 4/8/21), HTN and GERD -- presents to [facility] with chief complaints of palpitations and shortness of breath. History obtained from the patient. Patient reports new-onset palpitations and shortness of breath which started around 1700 this afternoon (was feeling \"similar\" approximately 3 days ago) / contacted EMS / full-dose ASA administered en route. Denied other symptoms on arrival, including chest pain. No recent illness, fevers/chills, nausea/emesis or syncope. Follows with cardiology (Dr. Khadar Dejesus) who recently referred him to Dr. Wander Quiñones for TAVR evaluation (scheduled 5/10). Completed CTA chest 4/29. Diagnostic L/RHC scheduled. Denies previous history of Afib or recent changes to outpatient medications. Reports baseline SBP 90s due to outpatient medications/diuretics. No baseline supplemental oxygen requirement. ED course: afebrile, normotensive, tachycardic (150s), tachypnic (30s) with SpO2 100% on 4L NC. Workup remarkable for: HGB 11.7 (.3), WBC 11.2, trop 0.337, BNP 24.4K, K 4.3, Mg 2.5, Cr 1.3 (BUN 40 / eGFR 52), , CO2: 22 (16). LA nml. Procal neg. COVID negative. Portable CXR: cardiomegaly, bilateral opacities (R>L). EKG: Afib with RVR, lateral ischemia.  Received IV diltiazem x 2 with no ROS was obtained and negative, with the exception of pertinent positives as stated in the HPI. PHYSICAL EXAM:  Vitals:    05/06/21 2300 05/06/21 2321 05/06/21 2330 05/06/21 2356   BP: 97/82 (!) 84/54 (!) 76/54 (!) 78/58   Pulse: 127 122     Resp: 16 18     Temp:  97.6 °F (36.4 °C)     TempSrc:  Oral     SpO2: 100% 98%     Weight:  148 lb 1.6 oz (67.2 kg)     Height:  5' 9\" (1.753 m)       General appearance: Alert / well-appearing elderly male. Pleasant. Cooperative. NAD. HEENT: Normocephalic / atraumatic. Conjunctivae appear normal.  Neck: Supple. No JVD. Respiratory: Mildly labored on 4L NC. Lung sounds diminished to auscultation with bilateral rales (R>L). No wheezes / rhonchi. Conversational dyspnea present. No accessory muscle use. Cardiovascular: Tachycardic rate. Irregularly irregular rhythm. Grade 3/6 systolic ejection murmur / heard best over RUSB. No thrills / rubs / gallops. Abdomen: Soft / non-tender / non-distended. BS present. Musculoskeletal: No cyanosis. 2+ pitting edema to BLE / ACE leg wraps in place. Skin: Warm / dry. No pallor / diaphoresis. Neurologic: A/O x 3. Speech normal. Answers questions appropriately. No obvious focal neurologic deficits. Psychiatric: Anxious-appearing. Thought content / judgment / insight appear appropriate. Peripheral pulses: Normal bilaterally.     Labs:   Results for orders placed or performed during the hospital encounter of 05/06/21   COVID-19, Rapid    Specimen: Nasopharyngeal Swab   Result Value Ref Range    SARS-CoV-2, NAAT NOT DETECTED NOT DETECTED   CBC Auto Differential   Result Value Ref Range    WBC 11.2 (H) 4.8 - 10.8 thou/mm3    RBC 3.75 (L) 4.70 - 6.10 mill/mm3    Hemoglobin 11.7 (L) 14.0 - 18.0 gm/dl    Hematocrit 38.0 (L) 42.0 - 52.0 %    .3 (H) 80.0 - 94.0 fL    MCH 31.2 26.0 - 33.0 pg    MCHC 30.8 (L) 32.2 - 35.5 gm/dl    RDW-CV 22.8 (H) 11.5 - 14.5 %    RDW-SD 86.5 (H) 35.0 - 45.0 fL    Platelets 396 326 - 182 thou/mm3    MPV 10.6 9.4 - 12.4 fL    Seg Neutrophils 83.8 %    Lymphocytes 6.1 %    Monocytes 7.7 %    Eosinophils 0.4 %    Basophils 0.8 %    Immature Granulocytes 1.2 %    Platelet Estimate ADEQUATE Adequate    Segs Absolute 9.4 (H) 1 - 7 thou/mm3    Lymphocytes Absolute 0.7 (L) 1.0 - 4.8 thou/mm3    Monocytes Absolute 0.9 0.4 - 1.3 thou/mm3    Eosinophils Absolute 0.0 0.0 - 0.4 thou/mm3    Basophils Absolute 0.1 0.0 - 0.1 thou/mm3    Immature Grans (Abs) 0.13 (H) 0.00 - 0.07 thou/mm3    nRBC 0 /100 wbc    Anisocytosis PRESENT Absent    CRENATED RBC'S 1+ Absent    Poikilocytes 1+ Absent    Target Cells 1+ Absent   Troponin   Result Value Ref Range    Troponin T 0.337 (A) ng/ml   Brain Natriuretic Peptide   Result Value Ref Range    Pro-BNP 35007.0 (H) 0.0 - 1800.0 pg/mL   Magnesium   Result Value Ref Range    Magnesium 2.5 (H) 1.6 - 2.4 mg/dL   Basic Metabolic Panel   Result Value Ref Range    Sodium 142 135 - 145 meq/L    Potassium 4.3 3.5 - 5.2 meq/L    Chloride 104 98 - 111 meq/L    CO2 22 (L) 23 - 33 meq/L    Glucose 151 (H) 70 - 108 mg/dL    BUN 40 (H) 7 - 22 mg/dL    CREATININE 1.3 (H) 0.4 - 1.2 mg/dL    Calcium 9.2 8.5 - 10.5 mg/dL   Procalcitonin   Result Value Ref Range    Procalcitonin 0.05 0.01 - 0.09 ng/mL   Anion Gap   Result Value Ref Range    Anion Gap 16.0 8.0 - 16.0 meq/L   Glomerular Filtration Rate, Estimated   Result Value Ref Range    Est, Glom Filt Rate 52 (A) ml/min/1.73m2   Osmolality   Result Value Ref Range    Osmolality Calc 295.8 275.0 - 300.0 mOsmol/kg   Lactate, Sepsis   Result Value Ref Range    Lactic Acid, Sepsis 1.9 0.5 - 1.9 mmol/L   APTT   Result Value Ref Range    aPTT 28.6 22.0 - 38.0 seconds   Scan of Blood Smear   Result Value Ref Range    SCAN OF BLOOD SMEAR see below    Lactate, Sepsis   Result Value Ref Range    Lactic Acid, Sepsis 1.9 0.5 - 1.9 mmol/L   EKG 12 Lead   Result Value Ref Range    Ventricular Rate 158 BPM    Atrial Rate 156 BPM    QRS Duration 106 ms    Q-T Interval 292 ms QTc Calculation (Bazett) 473 ms    R Axis 41 degrees    T Axis 173 degrees     Narrative & Impression  Atrial fibrillation with rapid ventricular response  Anterior infarct , age undetermined  ST & T wave abnormality, consider lateral ischemia  Abnormal ECG  No previous ECGs available     Radiology:     Xr Chest Portable  Result Date: 5/6/2021  Chest radiograph AP view Comparison:  CR,SR  - XR CHEST 1 VW  - 05/03/2021 01:45 PM EDT Findings: Enlarged cardiac contour, similar before. Increasing moderate to large right pleural effusion, superimposed scattered patchy opacities of the right lung. Small left pleural effusion decreased. Central pulmonary vascular congestion. No pneumothorax is observed. No acute fracture. Advanced degenerative changes of shoulders, unchanged. Impression: 1. Moderate to large right pleural effusion, increased. Superimposed scattered patchy opacities of the right lung, may reflect pulmonary edema, atelectasis or infection. 2.  Small left pleural effusion, improved. 3.  Otherwise unchanged. This document has been electronically signed by: Balta Whitehead MD on 05/06/2021 07:22 PM    Cta Abdomen Pelvis W Wo Contrast  Result Date: 4/29/2021  PROCEDURE: CTA ABDOMEN PELVIS W WO CONTRAST, CTA CHEST W WO CONTRAST CLINICAL INFORMATION: Nonrheumatic aortic valve stenosis. Pre-op TAVR. COMPARISON: No prior study. TECHNIQUE: Noncontrast 5 mm axial images were obtained through the chest, abdomen and pelvis. Intravenous Optiray contrast was given. ECG gated axial 0.6 mm axial images were attained of the entire heart. A CT of the entire chest, abdomen and pelvis was obtained at 0.6 mm increments. These are reconstructed into 3 x 3 axial images. Those are sent to PACS. 3-D reconstructions through the chest, abdomen and pelvis include sagittal and coronal MIP images performed on the scanner. Centerline reconstructions  were obtained.  Vascular and valve measurements are made on a dedicated 3-D workstation. Measurements were made in systole with the aortic valve open. All CT scans at this facility use dose modulation, iterative reconstruction, and/or weight-based dosing when appropriate to reduce radiation dose to as low as reasonably achievable. FINDINGS: MEASUREMENTS: AORTIC VALVE: Calcium score: 3806 Trileaflet valve. There is moderate calcification of the aortic valve leaflets. Valvular calcifications extend into the LVOT. [facility] AORTIC DIMENSIONS: Aortic annulus: 33 x 22 mm. Aortic annulus area: 4.9 cm2. Aortic annulus perimeter: 86 mm. Distance of right coronary ostia to the annulus: 19 mm. Distance of the left coronary ostia to the annulus: 18 mm. Maximum diameter of the aortic sinus: 33 mm. Maximum diameter of the sinotubular junction: 33 mm. Maximum diameter of the mid ascending aorta: 37 mm. Aortic root orientation: 3 cusp view: DIGGS 32, CAU 25; Anterior view: DIGGS 0, CAU 7 Coronary anatomy: The right coronary artery arises from the sinus of Valsalva on the right. The left main coronary artery arises from the sinus of Valsalva on the left. No anomalies are noted. Ascending aorta and arch:  Calcified atherosclerosis is seen in the aortic arch. Descending thoracic aorta: There is mild tortuosity of the descending thoracic aorta. There is no aneurysmal dilation. Abdominal aorta: There is no significant tortuosity of the abdominal aorta. There is aneurysmal dilatation of the infrarenal abdominal aorta which measures 3.5 cm. Scattered soft and calcified plaque are visualized. Iliofemoral access route: There is no significant tortuosity. Minimal diameter of the abdominal aorta: 16 mm. Minimal diameter of the right common iliac artery: 9 mm. Minimal diameter of the right external iliac artery: 7 mm. Minimal diameter of the right common femoral artery: 6 mm. Minimal diameter of the left common iliac artery: 7 mm. Minimal diameter of the left external iliac artery: 5 mm.  Minimal diameter of the left common femoral artery: 5 mm. HEART: There is cardiomegaly. The left atrial appendage is well opacified without evidence of thrombus. There are coronary artery calcifications. There is no pericardial effusion. PERICARDIUM: There is no pericardial effusion. CHEST: There is a large right-sided and moderate left-sided pleural effusion. Calcified granulomas are noted. There is atelectasis/pneumonia at the bilateral lung bases. The pulmonary arteries are adequately opacified. The main pulmonary artery appears dilated measuring 4.4 cm. This may be due to pulmonary arterial hypertension. There are no pulmonary emboli. No mediastinal adenopathy is noted. ABDOMEN:  The liver is normal. The spleen is normal. The adrenals and pancreas are normal. There is high density material in the gallbladder which may represent sludge. Some cysts are seen arising from both kidneys. There is no hydronephrosis. There is no evidence of a small bowel obstruction. There is aneurysmal dilatation of the infrarenal abdominal aorta measuring 3.5 cm. There is no adenopathy. PELVIS:  The urinary bladder is normal. There is no pelvic free fluid. The colon is within normal limits. There is no adenopathy. The prostate gland is enlarged. BONES: There are several age-indeterminate compression fractures in the lumbar spine, most severe at L5. There is generalized osteopenia. 1. Preoperative TAVR measurements as listed above. 2. Large right-sided and moderate left-sided pleural effusion. 3. Dilated main pulmonary artery which may be due to pulmonary arterial hypertension. 4. Mild aneurysmal dilatation of the infrarenal abdominal aorta. **This report has been created using voice recognition software. It may contain minor errors which are inherent in voice recognition technology. ** Final report electronically signed by Dr Frances Ann on 4/29/2021 4:23 PM    CODE STATUS: FULL    Thank you Jasen Bonner MD for the opportunity to be involved in this patient's care.     Electronically signed by Herrera Avila MD on 5/7/2021 at 12:30 AM

## 2021-05-07 NOTE — PROGRESS NOTES
1101. Ilda  messaged: I'm just trying to get a message through to Dr. Carter Rued that this patient's troponin  nearly tripled: 0.337 to 0.906. He's very dyspneic/tachypneic. BP continues to be ~80/60. Started 10 mg midodrine and hospitalist is doing a 250ml bolus. Jazlyn forwarded message to Dr. Carter Rued.

## 2021-05-07 NOTE — PROGRESS NOTES
Wound ostomy consulted for stage 2 pressure injury to buttocks POA. Recommend zinc cream to open area. Continue to turn every 2 hours and PRN, offload with pillows, ensure patient is on low air loss support surface [facility], SPR+, Sugar, Bariatric bed), utilize moisture wicking underpads, limit use of depends, and if applicable, use waffle cushion when in chair.

## 2021-05-07 NOTE — CONSULTS
Nephrology Consult Note  Patient's Name: Mauricio Delaney  1:45 PM  5/7/2021    Nephrologist: Fay Aggarwal    Reason for Consult: Acute kidney injury  Requesting Physician: Ari Henriquez MD  PCP: Jasen Bonner MD    Chief Complaint: Shortness of breath  Assessment  1. Acute kidney injury due to renal hypoperfusion from hypotension compounded by a low ejection fraction with possible type I cardiorenal syndrome. 2. Type I cardiorenal syndrome? 3. Hypotension  4. Cardiomyopathy with low ejection fraction  5. Atrial fibrillation with controlled ventricular rate  6. Elevated troponin level  7. Metabolic acidosis from acute kidney injury and tissue hypoxia non-anion gap  8. Macrocytic anemia  9. Right lung pleural effusion status post right thoracentesis  10. Severe aortic valve stenosis  11. Pulmonary hypertension    Plan    1. I discussed my thoughts at length with the patient. 2. He understood. 3. I addressed his questions. 4. Labs reviewed. 5. Checks x-ray report and images reviewed. 6. CT of the thorax report reviewed  7. CT abdomen and pelvis report reviewed  8. Recent echocardiogram report reviewed. 9. Medications reviewed  10. Increase midodrine to 10 mg 3 times a day  11. Reluctant to give intravenous fluids due to low ejection fraction  12. Okay to proceed with TAVR from renal perspective. 13. Patient understood that intravenous contrast can make renal function worse. 14. However, we both agree the benefit of TAVR outweighs the possible rise in serum creatinine level post procedure. 15. No other nephrotoxic drugs otherwise. 16. We will continue to follow      History Obtained From: Patient, staff and electronic medical record.   History of Present Ilness:    Mauricio Delaney is a 80 y.o. male with history of gastroesophageal reflux disease, irritable joint disease, cardiomyopathy with low ejection fraction among other medical entities listed below admitted through the emergency department after the patient presented there with shortness of breath. He also has some palpitations. He was found to have atrial fibrillation with rapid ventricular response that is said to be new. Chest x-ray also revealed large left pleural effusion. He had undergone right thoracentesis. His blood pressure has been low. No chest pain. No nausea vomiting. No fever or chills. No difficulty with urination. He had undergone evaluation for TAVR that included a CTA of the chest as well as the CT abdomen and pelvic. According to him, the cardiomyopathy with ejection fraction was noted at the [place] system in 2018. It was confirmed by echocardiogram done here at [facility] ([facility] Since then he has had  shortness of breath on and off. Shortness of breath got was 3 to 4 days before presentation was made worse by activities and relieved by rest.    Past Medical History:   Diagnosis Date    Acute on chronic congestive heart failure ([address]     Arthritis     Cataract     GERD (gastroesophageal reflux disease)     Senile osteoporosis        Past Surgical History:   Procedure Laterality Date    COLONOSCOPY  2010    EYE SURGERY  1995    cataracts    NASAL SEPTUM SURGERY Left 2009       Family History   Problem Relation Age of Onset    Stroke Mother    [facility] Other Father         old age   [facility] Other Sister         Cardiac arrest        reports that he quit smoking about 36 years ago. His smoking use included cigarettes, pipe, and cigars. He started smoking about 71 years ago. He quit after 0.00 years of use. He has never used smokeless tobacco. He reports current alcohol use. He reports that he does not use drugs. Allergies:  Patient has no known allergies.     Current Medications:    fluticasone (FLONASE) 50 MCG/ACT nasal spray 2 spray, Daily  [Held by provider] spironolactone (ALDACTONE) tablet 25 mg, Daily  [Held by provider] lisinopril (PRINIVIL;ZESTRIL) tablet 2.5 mg, Daily  sodium chloride flush 0.9 % injection 10 mL, 2 times per 05/06/2021    K 4.9 05/07/2021    K 4.3 05/06/2021    K 4.6 05/06/2021     05/07/2021    CO2 20 (L) 05/07/2021    CO2 22 (L) 05/06/2021    CO2 28 05/06/2021    CREATININE 1.5 (H) 05/07/2021    CREATININE 1.3 (H) 05/06/2021    CREATININE 1.2 05/06/2021    BUN 45 (H) 05/07/2021    BUN 40 (H) 05/06/2021    BUN 38 (H) 05/06/2021    GLUCOSE 133 (H) 05/07/2021    GLUCOSE 151 (H) 05/06/2021    GLUCOSE 112 (H) 05/06/2021    PHOS 5.6 (H) 05/07/2021    WBC 8.9 05/07/2021    WBC 11.2 (H) 05/06/2021    WBC 6.7 04/21/2021    HGB 12.0 (L) 05/07/2021    HGB 11.7 (L) 05/06/2021    HGB 11.5 (L) 04/21/2021    HCT 38.0 (L) 05/07/2021    HCT 38.0 (L) 05/06/2021    HCT 36.9 (L) 04/21/2021    MCV 96.2 (H) 05/07/2021     05/07/2021     {Labs reviewed    Imaging:  CXR results: Diagnostic images reports reviewed        Thank you Dr. Devora Wyatt MD for allowing us to participate in care of Lizzieremy Harp   **This report has been created using voice recognition software. It maycontain minor  errors which are inherent in voice recognition technology. **    Electronically signed by Art Mack MD on 5/7/2021 at 1:45 PM

## 2021-05-07 NOTE — H&P
CRITICAL CARE H&P      Patient:  Luz Liriano    Unit/Bed:4D-06/006-A  YOB: 1930  MRN: [id number]   PCP: Ten Harris MD  Date of Admission: 5/6/2021  Chief Complaint:- Cardiogenic Shock    Assessment and Plan:    1. Cardiogenic Shock: Due to non-STEMI and worsening LV function in the setting of severe aortic stenosis. Patient with persistent hypotension despite fluid resuscitation efforts. Doubled troponin level. Patient intubated on arrival to ICU 5/7, CVC placed. Patient required pressors with Levophed and Solo-Synephrine. Was placed on heparin drip prior to arrival.  Taken to Cath Lab for JEMMA, LHC and hemodynamic bridging. Maintain BP with Solo-Synephrine with down titration of Levophed. Will be placed on hemodynamic monitoring to assess wedge pressure, cardiac index, CVP. 2. Severe Symptomatic Aortic Stenosis: Echo on 4/8/2021 revealed EF of 25%, severe global hypokinesis of the LV, aortic valvular area 0.5 cm², mean aortic gradient 35 mmHg, peak velocity 364 cm/s. Patient initially undergoing for TAVR. Will likely need emergency TAVR given cardiogenic shock. Follow-up with patient after Cath Lab. 3. New Onset Atrial Fibrillation with RVR: Could likely be secondary to non-STEMI and LVH given severe AS with mild MR. Started on amiodarone gtt after failed conversion with Cardizem. [id number] score 4. Maintain on amiodarone gtt given hemodynamic instability. 4. Non-STEMI: EKG with ischemic changes. Troponin elevation from 0.337 --> 0.790. Patient already started on heparin gtt prior to arrival to ICU. Taken to Cath Lab for anticipated LHC. 5. Decompensated HFrEF: As stated above, EF is 25%. Symptomatic findings related to decompensated HFrEF with pulmonary edema. 6. TIFF on CKD stage III: Likely prerenal etiology given BUN/Cr ratio and decompensated HFrEF. Diuretics, ACE inhibitor and Aldactone held.   Patient undergoing anticipated LHC, will need to monitor kidney function closely. Nephrology is on board, patient fully aware of effects of IV contrast on renal function. Benefits of TAVR/LHC outweigh the risk of the rise in serum creatinine. 7. Chronic Macrocytic Anemia: Hemoglobin stable, MCV elevated. Will need to be careful with transfusing with pRBCs given setting of non-STEMI and cardiogenic shock. Will aim for transfusing 1 unit of PRBCs if Hb < 9.  8. GERD: Protonix daily. HPI:  Patient is a 59-year-old  male lifetime non-smoker who was transferred to the ICU from stepdown unit due to cardiogenic shock. Past medical history includes severe AS, mild MR, HFrEF with EF 25%, HTN and GERD. Presented [facility] on the night of 5/6 with chief complaint of palpitations and shortness of breath. Patient reported new onset palpitations and this will be which started around 1700 hours on 5/6. Reported feeling similar symptoms 3 days prior. On arrival denied any other associated symptoms, denied chest pain, recent illness, fever/chills, nausea/emesis, syncope. His cardiologist is Dr. Macie Duran who had referred him to Dr. Paty Goode for TAVR evaluation. Diagnostic RHC and LHC was scheduled. Patient without apparent history of atrial fibrillation, no recent changes in outpatient medications. Apparent baseline SBP in the 90s secondary to outpatient medications and diuretics. Does not use oxygen at baseline. He was found to be tachycardic in the 150s as well as tachypneic in the ED. CXR revealed cardiomegaly, bilateral opacities with right > left. EKG revealed A. fib with RVR as well as lateral ischemia. Was given IV diltiazem twice with no improvement. Started on amiodarone gtt as per recommendations by Dr. Caryle Bears. Cardioversion deferred unless MAP less than 60 mmHg. Heparin gtt was also started. On the morning of 5/7, after being admitted patient sound to be in acute hypoxic respiratory failure requiring oxygen via NC.   He was found to be overtly hypotensive with atraumatic. No scleral icterus. PERR  Neck: supple. No Thyromegaly. Lungs: No labored breathing prior to intubation     Cardiac: Irregularly irregular rhythm and rate. Abdomen: soft. Nontender. Extremities:  No clubbing, cyanosis, or edema x 4. Vasculature: capillary refill < 3 seconds  Skin: Dry. Appears pale. Psych: Prior to intubation was alert and oriented x3. Affect appropriate  Lymph:  No supraclavicular adenopathy. Neurologic:  No focal deficit. No seizures. Data: (All radiographs, tracings, PFTs, and imaging are personally viewed and interpreted unless otherwise noted).  Sodium 137, potassium 5.2, chloride 99, CO2 20, BUN 53, creatinine 1.9, anion gap 18, EGFR 33, calcium 9.3   Procalcitonin 0.11   WBC 8700, H&H 12.5/39.3, platelet count 806,983   Troponin x2: 0.337 --> 0.790   proBNP 24,420    INR 1.63   APTT 71.7   Urinalysis with few bacteria, no pyuria, no hematuria, trace ketones. Protein 30.  Telemetry shows atrial fibrillation    Seen with multidisciplinary ICU team.  Meets Continued ICU Level Care Criteria:    [x] Yes    [] No - Transfer Planned to listed location:  [] HOSPITALIST CONTACTED- DR     Case and plan discussed with Dr. Elke Anders and Dr. Elkin Castellano    Electronically signed by Adolfo Colin MD  CRITICAL CARE SPECIALIST    I have independently performed an evaluation on Jojo Geiger . I have reviewed the above documentation completed by the resident physician . Please see my additional contributions to the HPI, physical exam, assessment/medical decision making. 72-year-old gentleman presented in cardiogenic shock patient with rising troponins heart failure secondary to severe aortic stenosis. Needs for emergent JEMMA and valvuloplasty. Emergently intubated central line placement.   Patient be taken down the cath       Electronically signed by Charmayne Caprice, MD on 5/7/2021 at 6:56 PM

## 2021-05-07 NOTE — PROCEDURES
ICU PROCEDURE - ENDOTRACHEAL INTUBATION  Fernie Barrera     MRN#: [id number]  21      : 1930    INDICATION: 15-year-old gentleman presents in cardiogenic shock secondary     TIME OUT: taken    Permission obtained, risks/benefits reviewed:    ANESTHESIA:   [x]Ketamine  []Ativan  [] Morphine  []Propofol  []Other medications:      ESTIMATED BLOOD LOSS:  None. COMPLICATIONS:  [x]N/A  [] Other:    LARYNGOSCOPIC AIRWAY GRADE (CORMACK-LEHANE):[]1  [x]2a  []2b []3  []4        INTUBATION EQUIPMENT USED:  [x] Direct laryngoscope only    OUTCOME: Successful placement of # 8 Taperguard Evac endotracheal via   [x]Oral route    INSERTION DEPTH:  26  cm from   [x]lip           CONFIRMATION OF TUBE POSITION:   [x]Capnography - Strong & repeatable exhaled CO2 detection   [x]Multiple point auscultation   [x]SpO2 response   [x]STAT X-ray   []Bronchoscopic assessment    UNUSUAL FINDINGS:    PROCEDURE:     Using direct laryngoscopy, the vocal cords were visualized and the endotracheal tube was placed through the cords under direct vision. Good breath sounds were auscultated bilaterally without sounds over abdomen. Appropriate strong & repeatable exhaled CO2 detection was confirmed.        Electronically signed by Jennifer Marcus MD on 2021 at 6:39 PM

## 2021-05-08 PROBLEM — E44.0 MODERATE MALNUTRITION (HCC): Status: ACTIVE | Noted: 2021-01-01

## 2021-05-08 NOTE — PROGRESS NOTES
CRITICAL CARE PROGRESS NOTE      Patient:  Gian Gooden    Unit/Bed:4D-06/006-A  YOB: 1930  MRN: [id number]   PCP: Louisa Saab MD  Date of Admission: 5/6/2021  Chief Complaint:- Cardiogenic Shock    Assessment and Plan:    1. Cardiogenic Shock: Due to non-STEMI and worsening LV function in the setting of severe aortic stenosis. Patient with persistent hypotension despite fluid resuscitation efforts. Doubled troponin level. Patient intubated on arrival to ICU 5/7, CVC placed. Immediately went to Cath Lab after, PCI to LAD w/ EMILY. RCA shows severe CAD however severe vasoconstriction was present due to pressors, unable to perform PCI. Patient also underwent aortic valvuloplasty and an Impella was placed. JEMMA confirmed Impella in correct position, patient also underwent electrocardioversion and converted to NSR. Patient with improved PA O2 saturations after procedure. BP running more reasonable, weaning Solo-Synephrine gtt first, then Levophed. Continuing with Dobutrex gtt as per Dr. Isai Tracey. CVP now on the lower side, suspicious for third spacing. Bumex gtt initiated. IV albumin to run every 6 hours to help increase output and decrease third spacing. Further recommendations as per Dr. Isai Tracey. 2. Acute Respiratory Failure: Patient emergently intubated on 5/7. Current vent settings pressure control 12, PEEP 6. Rate set at 16 breaths/min. Sedation with IV Fentanyl 50 mcg/h and Precedex 0.9 mcg/kg/h. Additional doses of Versed needed due to increased RR. Will reduce and wean Precedex, continue with Fentanyl and Versed. Continue with lung protective strategies while on ventilation. Wean FiO2/O2 as tolerated with goal SpO2 > 90%. 3. Severe Symptomatic Aortic Stenosis: Echo on 4/8/2021 revealed EF of 25%, severe global hypokinesis of the LV, aortic valvular area 0.5 cm², mean aortic gradient 35 mmHg, peak velocity 364 cm/s.  Patient no longer TAVR candidate given current clinical status. S/p aortic valvuloplasty 5/7. Will continue to optimize BP with pressors as needed, Dobutrex gtt for inotropic support. 4. Coccyx Wound: According to patient's wife, patient first noted redness and tenderness in the mid lower back. Approximately 1 week ago he was seen for this and was given an antibiotic of which his wife did not remember, unable to find record in epic. Patient however discontinued taking the medication on his own, patient's wife states that she feels the redness/wound on the back did not fully heal.  Concern for sepsis given continued hemodynamic instability despite cardiovascular interventions. IV Zosyn and Vancomycin started 5/8 for broad-spectrum coverage. Pan cultures pending. Patient with low-grade fever, leukocytosis. Likely exacerbated by recent cardiac interventions. 5. Shock Liver: As evident by hepatocellular transaminitis. Ammonia level was checked which read > 2,000 however this is likely exaggerated and incorrect secondary to hemolysis from the Impella. LDH also severely elevated, will need to take into consideration the vast amount of hemolysis when interpreting lab values. 6. New Onset Atrial Fibrillation with RVR:  S/p JEMMA/DCCV at 100 J on 5/7. Patient now in sinus rhythm. Amiodarone gtt discontinued. Continue to monitor on telemetry. 7. Non-STEMI: EKG with ischemic changes. Troponin elevation from 0.337 --> 0.790. Status post PCI w/ EMILY to LAD. RCA with severe obstruction, however no PCI could be done due to severe vasoconstriction. Likely part of the etiology of patient's acute decompensation and new onset A. fib with RVR. On DAPT, will continue with Heparin gtt as well  8. Decompensated HFrEF: As stated above, EF is 25%. Symptomatic findings related to decompensated HFrEF with pulmonary edema. Diuresing with Bumex gtt. 9. TIFF on CKD stage III:  Worsening renal function, somewhat influenced by intravenous dye used for cath.   Patient making minimal urine.  Various electrolyte abnormalities, although partially due to hemolysis from the Impella, secondary to impaired renal excretion and resorption. Patient to get dialysis catheter today, might need to initiate CRRT/CVVH if patient still with inadequate urine output. 10. Chronic Macrocytic Anemia:  Exacerbated due to hemolysis from Impella. Need to set strict transfusion goal with 1 pRBCs if Hb < 9.0.  11. History of GERD: Complicated during JEMMA. We will continue PPI prophylaxis. Trickle feeds can be started tomorrow. HPI:  Patient is a 51-year-old  male lifetime non-smoker who was transferred to the ICU from stepdown unit due to cardiogenic shock. Past medical history includes severe AS, mild MR, HFrEF with EF 25%, HTN and GERD. Presented [facility] on the night of 5/6 with chief complaint of palpitations and shortness of breath. Patient reported new onset palpitations and this will be which started around 1700 hours on 5/6. Reported feeling similar symptoms 3 days prior. On arrival denied any other associated symptoms, denied chest pain, recent illness, fever/chills, nausea/emesis, syncope. His cardiologist is Dr. Sanchez  who had referred him to Dr. Felisha Major for TAVR evaluation. Diagnostic RHC and LHC was scheduled. Patient without apparent history of atrial fibrillation, no recent changes in outpatient medications. Apparent baseline SBP in the 90s secondary to outpatient medications and diuretics. Does not use oxygen at baseline. He was found to be tachycardic in the 150s as well as tachypneic in the ED. CXR revealed cardiomegaly, bilateral opacities with right > left. EKG revealed A. fib with RVR as well as lateral ischemia. Was given IV diltiazem twice with no improvement. Started on amiodarone gtt as per recommendations by Dr. James Navarro. Cardioversion deferred unless MAP less than 60 mmHg. Heparin gtt was also started.   On the morning of 5/7, after being admitted patient sound to be in acute hypoxic respiratory failure requiring oxygen via NC. He was found to be overtly hypotensive with SBP down to the 70s. Was given a bolus of IV NS to 250 cc and subsequently started on maintenance IV NS.  Nursing staff notified cardiology at the patient's troponin increased from 0.337-0.906. Patient also symptomatic. Patient transferred to ICU for further care of cardiogenic shock. Patient intubated and central line placed in right subclavian vein. Taken down to [facility]. S/p JEMMA with DCCV for a flutter/A. fib with successful conversion to NSR, s/p LHC with PCI to LAD w/ EMILY. S/p RHC, unable to perform PCI to obstructed RCA due to severe vasoconstriction. S/p aortic valvuloplasty and Impella. Patient's PA O2 saturation improving. Still requiring a bit of pressors. Patient with evidence of shock liver and worsening acute renal failure, vast amount of electrolyte and metabolite abnormalities. Patient also with significant hemolysis due to Impella. Dialysis catheter to be placed today, and urine output still inadequate we will need to initiate CRRT/CVVH. Prognosis very guarded, not a candidate for TAVR due to worsening clinical status. Past Medical History: As per HPI  Family History: Stroke in mother, cardiac arrest in father. Both . .  Social History: Lifetime non-smoker. Rarely drinks alcohol. No history of illicit drug use. .    ROS   Patient intubated.     Scheduled Meds:   calcium acetate  1,334 mg Per NG tube TID WC    chlorhexidine  15 mL Mouth/Throat BID    famotidine (PEPCID) injection  20 mg Intravenous Daily    fluticasone  2 spray Each Nostril Daily    [Held by provider] spironolactone  25 mg Oral Daily    [Held by provider] lisinopril  2.5 mg Oral Daily    sodium chloride flush  10 mL Intravenous 2 times per day    [Held by provider] furosemide  20 mg Intravenous Daily    [Held by provider] guaiFENesin  600 mg Oral BID    ticagrelor  90 mg Oral BID    aspirin 5. 4   Cortisol 19.64 (collected at 0410 hrs. Meghann Barron )   VBG: pH 7.39, PCO2 33, PO2 35   Ammonia > 2,000   Activated clotting time 142 seconds.  CXR: Sidehole of the orogastric tube in the stomach with tip directed toward the fundus, IVC approach Wainwright-Franklin catheter tip in the left lower lobe pulmonary artery, no other significant interval change   Telemetry shows    Seen with multidisciplinary ICU team.  Meets Continued ICU Level Care Criteria:    [x] Yes   [] No - Transfer Planned to listed location:  [] HOSPITALIST CONTACTED- DR     Case and plan discussed with Dr. Karen Briseno and Dr. Kasia Castro.     Electronically signed by Blayne Valderrama MD  CRITICAL CARE SPECIALIST

## 2021-05-08 NOTE — PROGRESS NOTES
Comprehensive Nutrition Assessment    Type and Reason for Visit:  Initial, Consult(TF ordering & management)    Nutrition Recommendations/Plan:   Start trickle feeds Nepro 10ml/hr per MD.    If pt able to tolerate trickle feeds, suggest increase by 10ml every 4 hrs as tolerated to goal Nepro 40ml/hr. Free water per MD.   Consider Hillary Minder Plus as appropriate. Nutrition Assessment:     Pt. moderately malnourished AEB criteria as listed below. At risk for further nutrition compromise r/t admit with Cardiogenic Shock,Intubated (5/7), Severe AS, New Onset Afib with RVR, NSTEMI, HF, TIFF on CKD III, GERD, increased needs for wound healing and underlying medical condition (Hx valvular heart disease, cardiomyopathy, HTN   Nutrition recommendations/interventions as per above. Malnutrition Assessment:  Malnutrition Status: Moderate malnutrition    Context:  Chronic Illness     Findings of the 6 clinical characteristics of malnutrition:  Energy Intake:  (< 50% 2 days)  Weight Loss:     Difficult to assess due to + 3, + 4 edema  Body Fat Loss:  1 - Mild body fat loss Orbital, Fat Overlying Ribs   Muscle Mass Loss:  1 - Mild muscle mass loss Temples (temporalis)  Fluid Accumulation:  7 - Severe Extremities   Strength:  Not Performed    Estimated Daily Nutrient Needs:  Energy (kcal):  3940-8515 kcals (25-30kcals/kgm); Weight Used for Energy Requirements:  (67.2kgm (5/6))     Protein (g):  67-80 grams (1-1.2 grams protein/kgm)monitor renal status; Weight Used for Protein Requirements:  (67.2kgm (5/6))        Fluid (ml/day):  per MD.;    Nutrition Related Findings:  Pt intubated (5/7). Dr Marychuy Lee requests trickle tube feeds. Pt intubated (5/7). Nephrology consult pending- may need dialysis per MD rounds. Pt appears thin, lives with wife.labs: (5/8) K+ 5.4, BUN 65, Creatinine 2.4, Glucose 136, Ca 7.6. MAP 86. poor prognosis noted. Meds: Phoslo, Bumex, Precedex, Solo-Synephrine, Nexterone, Levophed. No BM.       Wounds: wound coccyx mid stage II, 2 spots on coccyx        Current Nutrition Therapies:    DIET TUBE FEED CONTINUOUS/CYCLIC NPO; Renal Formula; Orogastric; Continuous; 10  Current Tube Feeding (TF) Orders:  · Feeding Route: Orogastric  · Formula: Renal  · Schedule: Continuous(Nepro TF to start 10ml/hr per MD)  ·   · Water Flushes: per MD  · Current TF & Flush Orders Provides: Nepro 10ml/hr yields 240ml, 19 grams protein, 39 grams CHO, 3 grams fiber, 174 grams CHO  · Goal TF & Flush Orders Provides: Nepro 40ml/hr  yields 1728kcals, 960ml, 78 grams protein, 155 grams CHO, 12 grams fiber, 12 grams fiber      Anthropometric Measures:  · Height: 5' 9\" (175.3 cm)  · Current Body Weight: 148 lb 1.6 oz (67.2 kg)(bedscale + 3 RLE & + 4 LLE edema)   · Admission Body Weight: 148 lb 1.6 oz (67.2 kg)((5/6) bedscale + 3 RLE & + 4 LLE edema)    · Usual Body Weight: (per EMR: (4/29) 156# 12.8oz, (4/22) 155#, (4/21) 138#)     · Ideal Body Weight: 160 lbs;   · BMI: 21.9  · Adjusted Body Weight:  ; No Adjustment   · BMI Categories: Underweight (BMI less than 22) age over 72       Nutrition Diagnosis:   · Moderate malnutrition, In context of chronic illness related to cardiac dysfunction as evidenced by intubation, NPO or clear liquid status due to medical condition, mild loss of subcutaneous fat, mild muscle loss      Nutrition Interventions:   Food and/or Nutrient Delivery:  Start Tube Feeding  Nutrition Education/Counseling:  Education not appropriate   Coordination of Nutrition Care:  Continue to monitor while inpatient    Goals:  Pt will receive  % estimated nutritional needs via tube feeding until able to transition to po feeds as appropriate.        Nutrition Monitoring and Evaluation:   Behavioral-Environmental Outcomes:  None Identified   Food/Nutrient Intake Outcomes:  Enteral Nutrition Intake/Tolerance  Physical Signs/Symptoms Outcomes:  Biochemical Data, Chewing or Swallowing, GI Status, Fluid Status or Edema, Hemodynamic Status, Nutrition Focused Physical Findings, Skin, Weight     Discharge Planning:     Too soon to determine     Electronically signed by Laura Kaplan, VALARIE, LD on 5/8/21 at 12:00 PM EDT    Contact: [phone]

## 2021-05-08 NOTE — PROGRESS NOTES
Impella Repositioning    Indication: Hemolysis    Externally Impella catheter stable. Pulled back the Impella on TTE guidance by 10 cm  Impella catheter resecured. Impella P-level P5    Pressures stable    HR unchanged.     Complications:  None  EBL: None  Medications:  Sedation per EMR    Summary:  Successful Impella CP Repositioning    Plan:  1) ICU management of shock  2) Wean pressors as tolerated  3) Follow SGC and SVO2    Mamie Jade MD  Interventional Cardiology

## 2021-05-08 NOTE — PROGRESS NOTES
Renal Progress Note    Assessment and Plan:    1. Acute kidney injury with serum creatinine worsening due to renal hypoperfusion from hypotension compounded by  cardiomyopathy with low ejection fraction  2. Cardiorenal syndrome type I  3. Respiratory failure on mechanical support  4. Hypotension on pressor agent  5. Cardiomyopathy with low ejection fraction  6. Severe aortic valve stenosis  7. Status post emergent cardiac catheterization with stenting of LAD  8. Hyperkalemia mild  9. Leukocytosis  10. Macrocytic anemia  11. Elevated ammonia level  12. Elevated liver enzymes due to Gilbert's syndrome compounded by hepatic shock from hypotension  13. Hyperphosphatemia  14. Metabolic acidosis improved  15. Lactic acidosis may be from tissue hypoxia  16.   Hypocalcemia corrects when corrected for low serum albumin level  PLAN:  Labs reviewed  Serum creatinine is worsening  Serum potassium is mildly high  Ammonia level is too high to measure if correct  Medications reviewed  Agree with calcium acetate  Lactulose 30 mL 3 times a day   Urine output appears to be improving   I hope the trend continues  If not we will entertain the idea of  CRRT  Labs in the morning  We will follow        Patient Active Problem List:     Constipation     Hx of adenomatous colonic polyps     Renal cyst, left     Renal cyst, right     Pyrosis     Therapeutic drug monitoring     Gastroesophageal reflux disease     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     Foreign body sensation in throat     Aortic stenosis     Macrocytic anemia     Hyperbilirubinemia     Gilbert's syndrome     Shortness of breath     Acute on chronic congestive heart failure (HCC)     Atrial fibrillation with rapid ventricular response (HCC)      Subjective:   Admit Date: 5/6/2021    Interval History:   Seen for acute kidney injury  On mechanical support  Updated by the staff  Patient took a turn for the worse yesterday  Blood pressure precipitously dropped  Was taken to emergent cardiac catheterization  Had  LAD lesion with subsequent stenting  Remains on mechanical support post procedure  Urine output has been low throughout the night but is improving now      Medications:   Scheduled Meds:   calcium acetate  1,334 mg Per NG tube TID WC    chlorhexidine  15 mL Mouth/Throat BID    famotidine (PEPCID) injection  20 mg Intravenous Daily    piperacillin-tazobactam  3,375 mg Intravenous Q8H    vancomycin (VANCOCIN) intermittent dosing (placeholder)   Other RX Placeholder    vancomycin  1,000 mg Intravenous Once    albumin human  25 g Intravenous Q6H    fluticasone  2 spray Each Nostril Daily    sodium chloride flush  10 mL Intravenous 2 times per day    [Held by provider] guaiFENesin  600 mg Oral BID    ticagrelor  90 mg Oral BID    aspirin  81 mg Oral Daily     Continuous Infusions:   heparin (PORCINE) Infusion 15 Units/kg/hr (05/08/21 1004)    phenylephrine (ANA-SYNEPHRINE) 50mg/250mL infusion 125 mcg/min (05/08/21 0915)    midazolam      sodium chloride      dexmedetomidine 0.9 mcg/kg/hr (05/08/21 0807)    fentaNYL (SUBLIMAZE) 1250 mcg in sodium chloride 0.9 % 250 mL 50 mcg/hr (05/08/21 0820)    norepinephrine 35 mcg/min (05/08/21 0808)    bumetanide 0.1 mg/mL infusion 2 mg/hr (05/08/21 0640)    DOBUTamine 10 mcg/kg/min (05/08/21 0014)    heparin 25,000 units in dextrose 5 %         CBC:   Recent Labs     05/07/21  1443 05/08/21  0045 05/08/21  0540   WBC 8.7 15.4* 16.2*   HGB 12.5* 11.0* 9.8*    281 278     CMP:    Recent Labs     05/07/21  1443 05/08/21  0410 05/08/21  0905    131* 139   K 5.2 5.3* 5.4*   CL 99 98 99   CO2 20* 19* 23   BUN 53* 60* 65*   CREATININE 1.9* 2.1* 2.4*   GLUCOSE 124* 131* 136*   CALCIUM 9.3 7.8* 7.6*   LABGLOM 33* 30* 26*     Troponin: No results for input(s): TROPONINI in the last 72 hours. BNP: No results for input(s): BNP in the last 72 hours.   INR:   Recent Labs     05/07/21  1443   INR 1.63* Lipids: No results for input(s): CHOL, LDLDIRECT, TRIG, HDL, AMYLASE, LIPASE in the last 72 hours. Liver:   Recent Labs     05/08/21  0410   AST 1,512*   *   ALKPHOS 31*   PROT 5.4*   LABALBU 2.7*   BILITOT 4.9*     Iron:  No results for input(s): IRONS, FERRITIN in the last 72 hours. Invalid input(s): LABIRONS  XR CHEST PORTABLE   Final Result   Tip of position of a right sided central line is not significantly changed tip is in the expected subclavian vein level somewhat lateral. All other tubes and lines are stable. The chest is stable. **This report has been created using voice recognition software. It may contain minor errors which are inherent in voice recognition technology. **      Final report electronically signed by Dr. Jonathan Tao on 5/8/2021 7:09 AM      XR CHEST PORTABLE   Final Result   1. Sidehole of the orogastric tube in the stomach with tip directed    toward the fundus. 2.  IVC approach Morrow-Franklin catheter tip in the left lower lobe pulmonary    artery. 3.  No other significant interval change. This document has been electronically signed by: Brooklynn Arriaga MD on    05/07/2021 09:28 PM      XR CHEST PORTABLE   Final Result   Left subclavian central line placement with tip in the central    brachiocephalic vein. This document has been electronically signed by: Brooklynn Arriaga MD on    05/07/2021 07:02 PM      XR CHEST PORTABLE   Final Result   1. Mild cardiomegaly. ET tube tip 6 cm proximal to saad. NG tube passes into the stomach. A left subclavian sheath is present and projects superior to the left clavicular head, possibly passing into the left internal jugular vein. 2. Severe degenerative changes both shoulders. Large bore chest tube projects over the pleural space right side. 3. Moderate-sized pleural effusion right side. Small effusion left side. Moderate superimposed bibasilar atelectasis/pneumonia.    4. Overall appearance of chest has worsened somewhat from earlier. **This report has been created using voice recognition software. It may contain minor errors which are inherent in voice recognition technology. **      Final report electronically signed by Dr. Gallo Moy on 5/7/2021 4:12 PM      XR CHEST PORTABLE   Final Result   Impression:   Stable portable chest with opacities in bilateral lung bases. Cardiomegaly present. This document has been electronically signed by: Cherelle Fuentes MD on    05/07/2021 02:46 AM      XR CHEST PORTABLE   Final Result   Impression:   1. Moderate to large right pleural effusion, increased. Superimposed    scattered patchy opacities of the right lung, may reflect pulmonary edema,    atelectasis or infection. 2.  Small left pleural effusion, improved. 3.  Otherwise unchanged. This document has been electronically signed by: Nena Singer MD on 05/06/2021    07:22 PM            Objective:   Vitals: /77   Pulse 104   Temp 100.4 °F (38 °C) (Core)   Resp 22   Ht 5' 9\" (1.753 m)   Wt 148 lb 1.6 oz (67.2 kg)   SpO2 95%   BMI 21.87 kg/m²    Wt Readings from Last 3 Encounters:   05/06/21 148 lb 1.6 oz (67.2 kg)   05/06/21 147 lb (66.7 kg)   04/29/21 156 lb 12.8 oz (71.1 kg)      24HR INTAKE/OUTPUT:      Intake/Output Summary (Last 24 hours) at 5/8/2021 1012  Last data filed at 5/8/2021 9164  Gross per 24 hour   Intake --   Output 815 ml   Net -815 ml       Constitutional: Well-developed elderly gentleman on mechanical support in no distress  Skin:normal with no rash or lesions. HEENT:Pupils are reactive. Endotracheal tube is noted. Orogastric tube is noted. Neck:supple with no thyromegaly or carotid bruit  Cardiovascular: Regular sinus rhythm  Respiratory: Decreased breath sound bilaterally  Abdomen: Soft. Good bowel sounds. Ext: 2+ bilateral LE edema. Both lower extremities are wrapped. Musculoskeletal:Intact  Neuro:  On mechanical support      Electronically signed by Rogers Wood Arias Robbins MD on 5/8/2021 at 10:12 AM  **This report has been created using voice recognition software. It maycontain minor  errors which are inherent in voice recognition technology. **

## 2021-05-08 NOTE — PROGRESS NOTES
Cardiology Progress Note      Patient:  Joanne Fuentes  YOB: 1930  MRN: [id number]   Acct: [de-identified]  Admit Date:  5/6/2021  Primary Cardiologist: Brandyn Vanegas MD    Chief Complaint: CV Shock    Subjective (Events in last 24 hours):   Patient stable this AM  Shock liver  Making some urine but not significant output  Dialysis catheter to be placed via R IJV    Hgb dropping  Sites stable  Ext fem-fem still working and intact  SGC pressures show decreased CVP  Impella at P9      Objective:   /77   Pulse 107   Temp 100.4 °F (38 °C) (Core)   Resp 21   Ht 5' 9\" (1.753 m)   Wt 148 lb 1.6 oz (67.2 kg)   SpO2 94%   BMI 21.87 kg/m²        TELEMETRY: Sinus tachycardia    Physical Exam:  General Appearance: sedated/intubated  Cardiovascular: tachycardia, normal S1 and S2, no murmurs, rubs, clicks, or gallops, distal pulses intact, no carotid bruits, no JVD  Pulmonary/Chest: bilateral crackles  Abdomen: soft, non-tender, non-distended, normal bowel sounds, no masses   Extremities: Cool extremities, 1+ bilateral pedal pulses  Skin: central warmth  Head: normocephalic and atraumatic  Eyes: pupils equal, round, and reactive to light  Neck: supple and non-tender without mass, no thyromegaly   Musculoskeletal: normal range of motion, no joint swelling, deformity or tenderness  Neurological: sedated/intubated    Medications:    calcium acetate  1,334 mg Per NG tube TID WC    chlorhexidine  15 mL Mouth/Throat BID    famotidine (PEPCID) injection  20 mg Intravenous Daily    piperacillin-tazobactam  3,375 mg Intravenous Q8H    vancomycin (VANCOCIN) intermittent dosing (placeholder)   Other RX Placeholder    vancomycin  1,000 mg Intravenous Once    fluticasone  2 spray Each Nostril Daily    [Held by provider] spironolactone  25 mg Oral Daily    [Held by provider] lisinopril  2.5 mg Oral Daily    sodium chloride flush  10 mL Intravenous 2 times per day    [Held by provider] furosemide  20 mg INR 1.63 05/07/2021       HgBA1c:    Lab Results   Component Value Date    LABA1C 5.4 05/07/2021       FLP:  No results found for: TRIG, HDL, LDLCALC, LDLDIRECT, LABVLDL    TSH:    Lab Results   Component Value Date    TSH 0.735 05/07/2021         Assessment:  Cardiogenic Shock 2/2 Severe AS/CAD  S/p BAV 26 x 4.5, 5/7/21  S/p PCI of the LAD, 5/7/21  S/p Impella CP at P9, Ext Fem-Fem  Severe RCA disease - non-revascularized  Aflutter/Afib s/p JEMMA-DCCV, 5/7/21  Progressive anemia - likely hemolysis related to Impella, LDH > 2500  TIFF on CKD  Shock Liver  Coccyx wound  Hyperammonemia - likely spurious from hemolysis    Plan:  · TTE done, shows Impella in correct position, no alarms  · Turn down Impella to P7 to reduce hemolysis  · Not candidate for TAVR in current state  · Place dialysis catheter   · Increased Bumex to 2 mg/hr IV and gave Diuril 500 mg IV - if no  significant UOP, will need CVVH   · PA pressures are stable, with mildly reduced CVP likely related to  third space, continue to monitor  · BMP, VBG, ABG, and CBC q 8 hours  · Trf to keep Hgb > ~10  · Wean phenylephrine gtt, then wean NE gtt, continue dobutamine  gtt for now  · CXR daily to evaluate Impella position  · DAPT, Heparin gtt  · D/c'ed Amiodarone  · Warm blankets  · PPI  · Reduce Precedex as tolerate/Continue Fentanyl/Versed  · IV ABx for WBC/Coccyx wound  · Blood cultures  · Start trickle feeds tomorrow  · IV albumin 25% q 6 hours BP and pulmonary edema  · I talked to the wife extensively and she gave consent to continue; if  he is not making progress by mid to late next week, will consider  goals of care at that time  · Daughter is out of state, she will be flying in, no decisions  regarding goals of care till she gets into town  · Prognosis: guarded, critical    FULL CODE  Greater than 60 minutes of time was spent managing critical issues, discussing with the family, and coordinating care.        Electronically signed by Elis Mart MD on 5/8/2021 at 9:31 AM

## 2021-05-08 NOTE — PLAN OF CARE
Problem: RESPIRATORY  Goal: Normal spontaneous ventilation  Outcome: Ongoing   Vent setting optimized to achieve target tidal volume, respiratory rate and ideal oxygen saturations. SBT will be performed when appropriate. Patient Weaning Progress    The patient's vent settings was not able to be weaned this shift. Ventilator settings that were weaned              [] Mode   [] Pressure support weaned   [] Fio2 weaned   [] Peep weaned             Spontaneous weaning trial  was not attempted.      *Specific details of weaning located in Ventilator documentation flowsheets*

## 2021-05-08 NOTE — PLAN OF CARE
Problem: Nutrition  Goal: Optimal nutrition therapy  Outcome: Ongoing   Nutrition Problem #1: Moderate malnutrition, In context of chronic illness  Intervention: Food and/or Nutrient Delivery: Start Tube Feeding  Nutritional Goals: Pt will receive  % estimated nutritional needs via tube feeding until able to transition to po feeds as appropriate.

## 2021-05-08 NOTE — PROGRESS NOTES
Interventional Progress Note    Asked to see patient urgently  Being seen by Dr. Mulu Garcia    Patient with severe AS, with low EF, came in with acute CHF and now Afib/flutter RVR  Hypotensive wit SBP 70-80s  IV Amiodarone, IV Heparin, IVF, Midodrine    Patient awake and talking  Dr. Carla Vail present    Long discussion with the patient, at this point, not a candidate for TAVR  He is salvage valvuloplasty or palliative care    Given that he is cold, wet, and hypotensive, clinically he is in cardiogenic shock    I asked him if he wanted to stop and have palliative care evaluate, but he is clear in the presence of the nurse and Dr. Mulu Garcia that he wants to proceed as FULL CODE with all possible invasive support options including intubation, cath/BAV, dialysis, Impella, JEMMA-DCCV, etc.      I advised him to talk to his family, but he stated he wants to proceed and does not need to talk to anyone as he wants to live. Prior to the valve, he was completely functional, he is a PhD/professor with good support system. Exam shows decreased JEANETTE with obliterated S2 as compared to outpatient evaluation suggestive of critical AS with worsening EF, he is mentally alert and oriented, and however, he has labored breathing and bibasilar crackles    I have asked that the patient be moved immediately to the ICU due to impending resp failure and CV shock for central venous access, intubation, pressors, and baseline labs, prior to cardiac catheterization. D/w Dr. Copeland Records as well, he is no longer a candidate for TAVR given his current state. Please stop all fluids. D/w Dr. Mulu Garcia - at this point salvage BAV, JEMMA-DCCV, +/- PCI is the only option, vs we withdraw care now. Patient will not accept withdrawal of care and again, wants aggressive intervention.       Further recommendations based on results and clinical course    Brady Lopez MD  Interventional Cardiologist

## 2021-05-08 NOTE — PROGRESS NOTES
[facility]il[facility] 60  PHYSICAL THERAPY MISSED TREATMENT NOTE  [facility] ICU 4D    Date: 2021  Patient Name: Devonte Farrar        MRN: [id number]   : 1930  (80 y.o.)  Gender: male                REASON FOR MISSED TREATMENT:  Hold treatment per nursing request.  Pt has arterial femoral line and not appropriate for PT. Will try back as able.      Saman Lund PT, DPT [id number]'

## 2021-05-08 NOTE — BRIEF OP NOTE
[address] [address]  Sedation/Analgesia Post Sedation Record    Pt Name: Lainey Foreman  Account number: [de-identified]  MRN: [id number]  YOB: 1930  Procedure Performed By: Jordan Rhoades, [address]  Primary Care Physician: Karena Fung MD  Date: 5/7/2021    POST-PROCEDURE    Physicians/Assistants: Jordan Rhoades, ARTEM    Procedure Performed:Cath      Sedation/Anesthesia: Versed/ Fentanyl and 2% xylocaine local anesthesia. Estimated Blood Loss: < 50 ml. Specimens Removed: None         Disposition of Specimen: N/A        Complications: No Immediate Complications.        Post-procedure Diagnosis/Findings:     CV Shock   Severe AS  Aflutter 2:1 - JEMMA done, no ESLINA clot, DCCV at 100 J - convert to SR    Pre-Intervention: PA sat 34%, CI 1.1, SBP 90-100s     Severe LAD -  1 EMILY  Severe RCA - no PCI for now, due to severe vasoconstriction    BAV with 25 x 4.5 TruDil --> 26 x 4.5 TruDil   16Fr Impella CP afterwards  5Fr antegrade perfusion sheath     Post-Intervention: PA sat 58%, off Neosynephrine, on Levophed 15, SBP 140s, Lasix 80 mg IV given x 1                                ARTEM Byrd MD  Electronically signed 5/7/2021 at 8:17 PM  Interventional Cardiology

## 2021-05-08 NOTE — CONSULTS
[address] [place] [address]                                  CONSULTATION    PATIENT NAME: Daxa West                  :        1930  MED REC NO:   [id number]                           ROOM:       0006  ACCOUNT NO:   [de-identified]                           ADMIT DATE: 2021  PROVIDER:     LINNEA Abreu Afia:  2021    CARDIOLOGY CONSULTATION    REASON FOR THE CONSULTATION:  Congestive heart failure with severe  aortic stenosis. REQUESTING PROVIDER:  Hospitalist service. HISTORY OF PRESENT ILLNESS:  This is a 42-year-old gentleman with a  history of aortic stenosis with recent worsening LV function and  development of congestive heart failure. He has been evaluated for  transcatheter valve replacement, comes in with a new-onset AFib with  worsening symptoms of heart failure and hypertension, pretty much in  some sort of a cardiogenic shock. We were consulted to assist in the  management. Unfortunately, this gentleman's condition has deteriorated  significantly in the last several weeks and up until now he has been in  the process of being evaluated for high risk TAVR. Denies any chest  pain. REVIEW OF SYSTEMS:  No fever, chills, or weight loss. No hematuria or  dysuria. No abdominal pain, nausea, vomiting, or diarrhea. No obvious  active psych problems or suicidal ideation. No skin rashes. No obvious  dizziness, lightheadedness, or loss of consciousness. No recent trauma. No bleeding problem. No HEENT-related problem. PAST MEDICAL HISTORY:  1.  Valvular heart disease. 2.  Cardiomyopathy. 3.  Hypertension. ALLERGIES:  No known drug allergies. MEDICATIONS:  Lisinopril 2.5 a day, amiodarone drip, midodrine 5 three  times a day, Aldactone. SOCIAL HISTORY:  No tobacco, no drugs, no alcohol. FAMILY HISTORY:  Noncontributory.     PHYSICAL EXAMINATION:  VITAL SIGNS: Showed a blood pressure of 100/60, heart rate of 80. GENERAL APPEARANCE:  The patient seems to be in distress, short of  breath. NECK:  Moderate JVD. LUNGS:  Decreased air entry. HEART:  Normal S1 and S2. Systolic murmur, grade 3/6. ABDOMEN:  Soft, nontender. Positive bowel sounds. No organomegaly. EXTREMITIES:  Moderate edema. NEUROLOGIC:  Grossly intact. Awake, alert. No focal deficits. PSYCH:  No evidence of active psychosis. SKIN:  No rashes. LABORATORY DATA:  Showed sodium 138, potassium 4.9, BUN 45, creatinine  1.5. White count 11.2, hemoglobin 11.7, hematocrit 38, platelets 940. EKG showed AFib with diffuse ST-T wave changes. IMPRESSION:  This is a very unfortunate gentleman who unfortunately got  to a point where his ejection fraction has gotten quite low, who has  been following through the [place] for the last several years for his aortic  stenosis. I had a very jeanette discussion with him today and Dr. Lindy Benz  joined us at the end of the discussion. Unfortunately, this gentleman  who seems to be quite well educated and intelligent about his condition,  was still in a very good mental functional capacity up until this  started happening, is aware of the poor prognosis and poor situation  that he is in. Unfortunately, this gentleman does not have much left to  be offered except to try some type of a salvage balloon valvoplasty and  possible need for emergent transcatheter valve replacement as pretty  much as a salvage procedure versus start looking at palliative care and  possible hospice type of treatment which he does not want, and he  prefers to try to go invasively even though he understands that this is  going to be a very risky procedure as well. Dr. Lindy Benz was present and  he was talked about likely intubating the patient and taking him down  for balloon valvoplasty which I think is very reasonable to consider and  decide accordingly.     Thank you for allowing me to participate in his care.         Steele Hodgkin, M.D.    D: 05/07/2021 17:38:27       T: 05/07/2021 17:48:07     [email or web address]  Job#: [id number]     Doc#: [id number]    CC:

## 2021-05-08 NOTE — PLAN OF CARE
Problem: Falls - Risk of:  Goal: Will remain free from falls  Description: Will remain free from falls  Outcome: Ongoing  Note: Bedrest maintained. Problem: Skin Integrity:  Goal: Will show no infection signs and symptoms  Description: Will show no infection signs and symptoms  Outcome: Ongoing  Note: Unable to turn patient due to condition. Problem: Nutrition  Goal: Optimal nutrition therapy  5/8/2021 1817 by Elsa Talbot RN  Outcome: Ongoing  Note: Initiate tube feedings tomorrow per Dr. Marilyn Murphy. Problem: Discharge Planning:  Goal: Participates in care planning  Description: Participates in care planning  Outcome: Ongoing  Note: Pending course and ability to wean from impella. Problem: Airway Clearance - Ineffective:  Goal: Ability to maintain a clear airway will improve  Description: Ability to maintain a clear airway will improve  Outcome: Ongoing  Note: Minimal secretions from ET tube, blood tinged. Problem: Bowel Function - Altered:  Goal: Bowel elimination is within specified parameters  Description: Bowel elimination is within specified parameters  Outcome: Ongoing  Note: Lactulose as ordered. Problem: Cardiac Output - Decreased:  Goal: Hemodynamic stability will improve  Description: Hemodynamic stability will improve  Outcome: Ongoing  Note: CI acceptable, unable to wean from drips. Problem: Fluid Volume - Imbalance:  Goal: Absence of imbalanced fluid volume signs and symptoms  Description: Absence of imbalanced fluid volume signs and symptoms  Outcome: Ongoing  Note: Strict intake and output. Problem: Pain:  Goal: Pain level will decrease  Description: Pain level will decrease  Outcome: Ongoing  Note: Cont. Fentanyl gtt. Problem: Skin Integrity - Impaired:  Goal: Will show no infection signs and symptoms  Description: Will show no infection signs and symptoms  Outcome: Ongoing  Note: Turn q2h, low airloss alt. Pressure relief mattress.      Problem: Tissue Perfusion, Altered:  Goal: Circulatory function within specified parameters  Description: Circulatory function within specified parameters  Outcome: Ongoing  Note: Cont. Abp monitoring. Care plan reviewed with patient and family. Family verbalizes understanding of the plan of care and contributes to goal setting.

## 2021-05-08 NOTE — PROCEDURES
Central Line Insertion     Indication:  Need for Dialysis    Access: R IJV    Location: ICU    Emergency consent in the benefit of the patient was invoked. Wife was notified earlier of the need for possibly dialysis. Line needed for impending progressive renal failure. RIJV was prepped in sterile fashion. Lidocaine 2% SQ was given and standard ultrasound-guided access using modified Seldinger technique was obtained. 0.035 guidewire was introduced, progressive dilations were done, and then a 14Fr - 20 cm dialysis catheter was inserted and sutured in place. Both ports flushed. Complications:  None.     EBL: < 10 cc    Summary:   14Fr R IVJ Dialysis Line Insertion     Plan:  1) Check CXR  2) Initiate CVVH per renal orders      Bimal Fleming MD  Interventional Cardiology

## 2021-05-09 NOTE — PROGRESS NOTES
1111-  Impella flow rate reduced to P2  1113-  Impella removed by Dr. Hero Badillo. 1116-  Sheath removed- Perclose seal at the bedside. 1118-  Heparin gtt. Stopped per verbal order. 1815-  UF paused due to hypotension.

## 2021-05-09 NOTE — PLAN OF CARE
Problem: Airway Clearance - Ineffective:  Goal: Ability to maintain a clear airway will improve  Description: Ability to maintain a clear airway will improve  5/9/2021 1427 by Soheila Gallegos RCP  Outcome: Ongoing     Problem: RESPIRATORY  Goal: Clear lung sounds  Description: Clear lung sounds  Outcome: Ongoing     Problem: RESPIRATORY  Goal: Normal spontaneous ventilation  5/9/2021 1427 by Soheila Gallegos RCP  Outcome: Ongoing                                                  Patient Weaning Progress    The patient's vent settings was not able to be weaned this shift. Ventilator settings that were weaned              [] Mode   [] Pressure support weaned   [] Fio2 weaned   [] Peep weaned             Spontaneous weaning trial  was not attempted.      *Specific details of weaning located in Ventilator documentation flowsheets*

## 2021-05-09 NOTE — PLAN OF CARE
Problem: Falls - Risk of:  Goal: Will remain free from falls  Description: Will remain free from falls  Outcome: Ongoing  Goal: Absence of physical injury  Description: Absence of physical injury  Outcome: Ongoing     Problem: Skin Integrity:  Goal: Will show no infection signs and symptoms  Description: Will show no infection signs and symptoms  Outcome: Ongoing  Goal: Absence of new skin breakdown  Description: Absence of new skin breakdown  Outcome: Ongoing     Problem: RESPIRATORY  Goal: Normal spontaneous ventilation  5/9/2021 0454 by Lionel Carter RCP  Outcome: Ongoing  Note: Vent setting optimized to achieve target tidal volume, respiratory rate and ideal oxygen saturations. SBT will be performed when appropriate. Patient Weaning Progress    The patient's vent settings was not able to be weaned this shift. Spontaneous weaning trial  was not attempted. *Specific details of weaning located in Ventilator documentation flowsheets*      Problem: Nutrition  Goal: Optimal nutrition therapy  Outcome: Ongoing     Problem: Discharge Planning:  Goal: Participates in care planning  Description: Participates in care planning  Outcome: Ongoing  Goal: Discharged to appropriate level of care  Description: Discharged to appropriate level of care  Outcome: Ongoing     Problem: Airway Clearance - Ineffective:  Goal: Ability to maintain a clear airway will improve  Description: Ability to maintain a clear airway will improve  Outcome: Ongoing     Problem: Aspiration:  Goal: Absence of aspiration  Description: Absence of aspiration  Outcome: Ongoing     Problem:  Bowel Function - Altered:  Goal: Bowel elimination is within specified parameters  Description: Bowel elimination is within specified parameters  Outcome: Ongoing     Problem: Cardiac Output - Decreased:  Goal: Hemodynamic stability will improve  Description: Hemodynamic stability will improve  Outcome: Ongoing     Problem: Fluid Volume - Imbalance:  Goal: Absence of imbalanced fluid volume signs and symptoms  Description: Absence of imbalanced fluid volume signs and symptoms  Outcome: Ongoing     Problem: Gas Exchange - Impaired:  Goal: Levels of oxygenation will improve  Description: Levels of oxygenation will improve  Outcome: Ongoing     Problem: Pain:  Goal: Pain level will decrease  Description: Pain level will decrease  Outcome: Ongoing  Goal: Recognizes and communicates pain  Description: Recognizes and communicates pain  Outcome: Ongoing  Goal: Control of acute pain  Description: Control of acute pain  Outcome: Ongoing  Goal: Control of chronic pain  Description: Control of chronic pain  Outcome: Ongoing     Problem: Skin Integrity - Impaired:  Goal: Will show no infection signs and symptoms  Description: Will show no infection signs and symptoms  Outcome: Ongoing  Goal: Absence of new skin breakdown  Description: Absence of new skin breakdown  Outcome: Ongoing     Problem: Tissue Perfusion, Altered:  Goal: Circulatory function within specified parameters  Description: Circulatory function within specified parameters  Outcome: Ongoing     Problem: Tissue Perfusion - Cardiopulmonary, Altered:  Goal: Absence of angina  Description: Absence of angina  Outcome: Ongoing  Goal: Hemodynamic stability will improve  Description: Hemodynamic stability will improve  Outcome: Ongoing     Will continue to monitor

## 2021-05-09 NOTE — PROGRESS NOTES
2019 - Secure message sent to Dr. Kasia Castro regarding pt Svo2 50%. Waiting for response. 2024 - Call received from Dr. Kasia Castro. Updated on Svo2 results. Reviewed vital signs, lab results, drips, and hemodynamics at this time. Dr. Kasia Castro stated to update Dr. Rolanda Zuñiga on pt being up 2L and with BMP when resulted for possible CRRT. No further orders received a this time. 2103 - Secure message sent to Dr. Rolanda Zuñiga with BMP results and intake and output. Waiting for response. 2110 - call received from Dr. Rolanda Zuñiga. Reviewed intake and output and BMP results. No new orders received at this time. 2116 - secure message sent to Dr. Kasia Castro updating him on Dr. Orlando James decision for no CRRT at this time. 2121- Call received from Dr. Kasia Castro. Reviewed pt case at this time. See new orders.

## 2021-05-09 NOTE — PROCEDURES
[address] [place] [address]                            CARDIAC CATHETERIZATION    PATIENT NAME: Omar Morales                  :        1930  MED REC NO:   [id number]                           ROOM:       0006  ACCOUNT NO:   [de-identified]                           ADMIT DATE: 2021  PROVIDER:     Anthony Coh MD    DATE OF PROCEDURE:  2021    INDICATION:  Cardiogenic shock, severe symptomatic aortic stenosis,  acute hypoxic respiratory failure. MEDICAL DECISION-MAKING:  Long discussion with the patient regarding the  patient's overall condition. He was presenting with acute cardiogenic  shock with respiratory failure. I explained that the mortality is quite high  at this point over 50% and if he does nothing, it is even higher. He  was explained the risks, benefits, and alternatives of balloon aortic  valvuloplasty, PCI, temporary pacemaker, Impella insertion, intraaortic  balloon pump insertion leading for intubation, renal failure requiring  dialysis, CPR, and defibrillation as well as JEMMA cardioversion. He is  very clear that he did not want to die and he was of sound mind, but he  wanted to remain full code. I did offer him palliative care, but he  refused and stated that he would like to try everything possible to  survive prior to allowing nature to take its course. This was discussed  with his primary cardiologist, Dr. Mulu Garcia, who concurred. Thereafter, the patient was transferred to the ICU, intubated, and  sedated and then brought to the cardiac catheterization laboratory for  further intervention and management. DESCRIPTION OF PROCEDURE:  As noted above, after informed consent was  obtained from the patient, he was brought to the cardiac catheterization  laboratory and prepped in sterile fashion. Right femoral artery and  vein were chosen as the primary points of access.   Preprocedure timeout was completed. After infiltration of the right inguinal region with 2%  lidocaine using micropuncture, modified Seldinger technique under  fluoroscopic guidance and ultrasound guidance, I was able to insert a  4-[place] citizen micropuncture sheath in the right femoral artery and a 5-[place] citizen  sheath into the right femoral vein. Angiography confirmed common  femoral artery puncture. I then upsized the access to 6-[place] citizen. I  deployed two Perclose sutures in an orthogonal fashion. I then upsized  the access to a 14-[place] citizen x 30 cm sheath. Prior to intervention, I  floated a 5-[place] citizen Calles balloon-tipped wedge [place] catheter up into the  cardiac chambers and measured cardiac pressure and saturations. RA 17, RV 63/21, PA 68/31 with a mean of 42, wedge pressure 35, PA  saturation 35%. Cardiac index was 1.1 and SVRI is 5689. Thereafter, I performed coronary angiography using 6-[place] citizen JL-4 and  6-[place] citizen JR-4 catheters. CORONARY ANGIOGRAM:  LEFT MAIN:  Patent without any significant obstruction. LAD:  Severe calcification throughout the proximal LAD with mid 90%  stenosis followed by distal diffuse vasospasm. There is a large  diagonal branch that is about 2 mm in diameter that is heavily diseased  and calcified. LCX:  Heavy calcification proximally with patent OM1 and OM2 with  moderate luminal irregularities throughout. RCA:  Heavily calcified mid segment with 50% to 60% stenosis. The RCA  bifurcates into the PDA and the PLB. PDA is patent. PLB has a mid 90%  stenosis prior to bifurcation into superior and inferior branch. The  distal PLB is less than 1 mm diameter. Given these findings, I did not want to perform any valvuloplasty, paced  the patient at multiple times between 150 and 180 paces per minute  without having some improved perfusion to the anterior wall. Although  the distal LAD was vasospastic and diffusely narrowed, the mid was  severely calcified and stenosed.   There were plans to intervene on the  LAD to help improve perfusion to the anterior wall. I exchanged out for a 6-Micronesian EBU 4.0 guiding catheter. Cannulated the  left main without complication. Heparin IV had already been given. ACT  was confirmed to be above 250 seconds. Wired the LAD using a [place]  wire. I predilated the lesion using a 2.0 x 8 NC balloon until 16  atmospheres times multiple inflations at the level of the lesion. I  stented the lesion using a 2.0 x 22 [place]olute [place] EMILY. Postdilated the  stent with a 2.5 x 15 NC balloon and then used a 2.5 x 8 at the level of  underexpansion of the stent up to 22 atmospheres. Post-PCI angiography  demonstrated MELITON-3 flow without any perforation, dissection, distal  embolization, side branch loss, guide or guidewire-induced trauma. Given that the LAD had improved perfusion to the apex, no further  intervention was undertaken at that point. I removed all equipments  from the coronaries. In between the catheter exchanges, I did remove  the Yolie Negus catheter from the right femoral vein and floated a 5-Micronesian  balloon-tipped pacemaker into the right ventricular apex. Pacing  thresholds were checked. Pacemaker capture lasted less than 1 mA  output. Pacemaker was set at 50 paces per minute and 10 mA output  backup pacing. I then exchanged out for a 6-Micronesian AL1 catheter and 0.035  straight-tipped guidewire and crossed the aortic valve without any  complications. I exchanged out for an angled pigtail. Hemodynamics  were transduced. Preprocedure TTE was done demonstrating a valve area  of 0.3 cm2 with a low gradient approximately 20 mmHg. Invasive  hemodynamics also demonstrated approximately 20 mmHg mean gradient and  an EDP of 33. No significant aortic insufficiency was noted. I then exchanged out for an extra small [place] wire and placed in the  left ventricular apex. We prepped a 25 x 4.5 true dilation balloon per  's recommendations.   This was femoral  artery and then passed the Impella CP catheter into the left ventricle. Impella was turned on. Flows were 3.5 to 4 mL per minute. He was  requiring again less and less doses of phenylephrine. Therefore, we  downtitrated and shut off the phenylephrine. I re-floated the [place]  catheter in the right femoral vein for hemodynamic  monitoring. PA saturation after valvuloplasty was 58%. Transduced  hemodynamics showed almost no invasive peak-to-peak gradient and no  change in EDP. Thereafter, all sheaths were secured in place; however,  there was diminished flow to the right lower extremity. Therefore, I  elected to proceed with ultrasound-guided antegrade access with a  micropuncture sheath into the right SFA. Then, I exchanged out for a  5-Yemeni antegrade sheath that was hooked up to the right femoral  16-Yemeni sheath for a male-to-male connection and external fem-fem  bypass that improved blood flow to the right lower extremity. Of note, prior to the catheterization initiating, the patient was  already sedated and intubated. I did perform a JEMMA demonstrating no  left atrial or left atrial appendage thrombus and significantly reduced  ejection fraction approximately 10% to 15% with severe aortic stenosis. No significant other mitral or tricuspid disease and therefore, the  patient was cardioverted at 100 joules with biphasic cardioversion at  100 joules to normal sinus rhythm. IMMEDIATE COMPLICATIONS:  None. MEDICATIONS:  See EMR. ACCESS:  See above. ESTIMATED BLOOD LOSS:  50 to 100 mL. SUMMARY:  Successful JEMMA-guided cardioversion; successful LAD PCI x1  EMILY; successful balloon aortic valvuloplasty; Impella CP insertion;  [place] catheter insertion; external fem-fem bypass; severe aortic  stenosis; cardiogenic shock. PLAN:  1. ICU care. 2.  Continue management of the patient's cardiogenic shock. 3.  We will speak to the family regarding the patient's prognosis.   4. At this point, he is requiring only Levophed and it is being  downtitrated as tolerated. Doing well with the Impella and he has  improved PA saturations. Therefore, we will monitor his outcome. 5.  May need renal replacement therapy as he already has hypoperfusion  and cardiorenal syndrome. Here, we only used approximately 60 to 65 mL  of dye which did not cause significant contrast-induced nephropathy. 6.  At this point, he is not a candidate for TAVR given his state. We  will assess if he does survive the admission. 7. DAPT. 8.  Groin care. 9.  Flush the fem-fem bypass every two hours. 10.  Heparin IV post cardioversion and per Impella protocol. 11.  Monitor pulses. 12.  Monitor for hemolysis. 13.  Wean pressors as tolerated. 14.  Follow up VBG, ABGs, CBC, BMP, and LFTs every six to eight hours. 15.  May need IV antibiotics. All the above was explained to the patient and the patient's family. They are agreeable and amenable to the above plan.         Azucena Jenkins MD    D: 05/08/2021 12:54:23       T: 05/08/2021 17:13:15     [email or web address]  Job#: [id number]     Doc#: [id number]    CC:

## 2021-05-09 NOTE — PROGRESS NOTES
Renal Progress Note    Assessment and Plan:    1. Acute kidney injury with serum creatinine slightly increased today from yesterday. 2.  Cardiorenal syndrome type I  3. Hypoxic respiratory failure on mechanical support  4. Hypotension needing pressors  5. Cardiomyopathy with low ejection fraction  6. Severe aortic valve stenosis  7. Normocytic anemia  8. Mild hyperkalemia  9. Hyponatremia multifactorial most likely  10. Deconditioned state  11. Metabolic acidosis  12. Hypoalbuminemia  13. elevated liver enzyme  14. Bleeding diathesis   PLAN:  Labs reviewed  Serum creatinine is slightly increased today from yesterday  Serum potassium is very slightly high  Medications reviewed  DDAVP 0.5 mcg/kg IV piggyback once for bleeding diathesis in a renal patient  Pharmacy to double concentrate IVPBs where possible  CRRT only if requested by cardiology and if this would improve his cardiac status  Discussed with staff  Labs in the morning  We will follow        Patient Active Problem List:     Constipation     Hx of adenomatous colonic polyps     Renal cyst, left     Renal cyst, right     Pyrosis     Therapeutic drug monitoring     Gastroesophageal reflux disease     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     Foreign body sensation in throat     Aortic stenosis     Macrocytic anemia     Hyperbilirubinemia     Gilbert's syndrome     Shortness of breath     Acute on chronic congestive heart failure (HCC)     Atrial fibrillation with rapid ventricular response ([facility] [address]      Subjective:   Admit Date: 5/6/2021    Interval History:   Seen for acute kidney injury  Remains on mechanical support  Updated by the staff  Blood pressure remains low   Continue to bleed from everywhere according to the staff  Urine output is good      Medications:   Scheduled Meds:   calcium acetate  1,334 mg Per NG tube TID WC    chlorhexidine  15 mL Mouth/Throat BID    famotidine (PEPCID) injection  20 mg Intravenous Daily    piperacillin-tazobactam  3,375 mg Intravenous Q8H    albumin human  25 g Intravenous Q6H    calcium replacement protocol   Other RX Placeholder    fluticasone  2 spray Each Nostril Daily    sodium chloride flush  10 mL Intravenous 2 times per day    [Held by provider] guaiFENesin  600 mg Oral BID    ticagrelor  90 mg Oral BID    aspirin  81 mg Oral Daily     Continuous Infusions:   norepinephrine (LEVOPHED) infusion (double concentration) 40 mcg/min (05/09/21 0658)    phenylephrine (ANA-SYNEPHRINE) 250 mg/250 mL infusion 120 mcg/min (05/09/21 0700)    heparin (PORCINE) Infusion 15 Units/kg/hr (05/09/21 0748)    midazolam 2 mg/hr (05/08/21 1943)    sodium chloride      dexmedetomidine Stopped (05/08/21 1939)    fentaNYL (SUBLIMAZE) 1250 mcg in sodium chloride 0.9 % 250 mL 50 mcg/hr (05/09/21 0155)    bumetanide 0.1 mg/mL infusion 2 mg/hr (05/09/21 0416)    DOBUTamine 10 mcg/kg/min (05/09/21 0155)    heparin 25,000 units in dextrose 5 %         CBC:   Recent Labs     05/08/21  0045 05/08/21  0540 05/08/21  1305 05/08/21 2011 05/09/21  0620   WBC 15.4* 16.2*  --   --  15.8*   HGB 11.0* 9.8* 7.7* 8.4* 10.2*    278  --   --  151     CMP:    Recent Labs     05/08/21  1305 05/08/21 2011 05/09/21  0432   * 132* 133*   K 5.2 5.0 5.4*   CL 98 97* 96*   CO2 23 21* 20*   BUN 69* 72* 76*   CREATININE 2.5* 2.4* 2.5*   GLUCOSE 149* 128* 138*   CALCIUM 7.6* 8.0* 8.0*   LABGLOM 24* 26* 24*     Troponin: No results for input(s): TROPONINI in the last 72 hours. BNP: No results for input(s): BNP in the last 72 hours. INR:   Recent Labs     05/07/21  1443 05/08/21  1554   INR 1.63* 2.07*     Lipids: No results for input(s): CHOL, LDLDIRECT, TRIG, HDL, AMYLASE, LIPASE in the last 72 hours. Liver:   Recent Labs     05/09/21  0432   AST 1,252*   *   ALKPHOS 33*   PROT 5.2*   LABALBU 3.2*   BILITOT 7.9*     Iron:  No results for input(s): IRONS, FERRITIN in the last 72 hours.     Invalid input(s): report has been created using voice recognition software. It may contain minor errors which are inherent in voice recognition technology. **      Final report electronically signed by Dr. Debra Hammonds on 5/7/2021 4:12 PM      XR CHEST PORTABLE   Final Result   Impression:   Stable portable chest with opacities in bilateral lung bases. Cardiomegaly present. This document has been electronically signed by: Jeovanny Conroy MD on    05/07/2021 02:46 AM      XR CHEST PORTABLE   Final Result   Impression:   1. Moderate to large right pleural effusion, increased. Superimposed    scattered patchy opacities of the right lung, may reflect pulmonary edema,    atelectasis or infection. 2.  Small left pleural effusion, improved. 3.  Otherwise unchanged. This document has been electronically signed by: Tobi Luther MD on 05/06/2021    07:22 PM            Objective:   Vitals: BP 90/66   Pulse 97   Temp 99.7 °F (37.6 °C) (Core)   Resp 21   Ht 5' 9\" (1.753 m)   Wt 148 lb 1.6 oz (67.2 kg)   SpO2 95%   BMI 21.87 kg/m²    Wt Readings from Last 3 Encounters:   05/06/21 148 lb 1.6 oz (67.2 kg)   05/06/21 147 lb (66.7 kg)   04/29/21 156 lb 12.8 oz (71.1 kg)      24HR INTAKE/OUTPUT:      Intake/Output Summary (Last 24 hours) at 5/9/2021 0842  Last data filed at 5/9/2021 8603  Gross per 24 hour   Intake 9292 ml   Output 2400 ml   Net 6892 ml       Constitutional: Well-developed elderly gentleman on mechanical support in no distress  Skin:normal with no rash or lesions. HEENT:Pupils are reactive. Endotracheal tube is noted. Orogastric tube is noted. Neck:supple with no thyromegaly or carotid bruit  Cardiovascular: Regular sinus rhythm  Respiratory: Clear to auscultation in the anterior  Abdomen: Soft. Good bowel sounds. Ext: 1 to 2+ bilateral LE edema. Both lower extremities are wrapped. Musculoskeletal:Intact  Neuro:  On mechanical support      Electronically signed by Ry Anderson MD on 5/9/2021 at 8:42 AM  **This report has been created using voice recognition software. It maycontain minor  errors which are inherent in voice recognition technology. **

## 2021-05-09 NOTE — PROGRESS NOTES
Cardiology Progress Note      Patient:  Joy Pal  YOB: 1930  MRN: [id number]   Acct: [de-identified]  Admit Date:  5/6/2021  Primary Cardiologist: Jelly Pickard MD    Chief Complaint: CV shock    Subjective (Events in last 24 hours):    Impella CP explanted due to hemolysis  16Fr RFA closed with 2 perclose  Heparin off for now  No sig UOP in over 48 hours despite Bumex gtt and Diuril  LDH remains high despite Impella repositioning   Total Bili up and primarily indirect fraction  Hgb 10.2, Plt 151  FFP given and 2U PRBCs  Lactate down to 2.3  PA sat 55-60%  CI 2.4    Objective:   BP 93/65   Pulse 92   Temp 98.2 °F (36.8 °C) (Core)   Resp 24   Ht 5' 9\" (1.753 m)   Wt 148 lb 1.6 oz (67.2 kg)   SpO2 99%   BMI 21.87 kg/m²        TELEMETRY: NSR    Physical Exam:  General Appearance: sedated/intubated  Cardiovascular: tachycardia, normal S1 and S2, no murmurs, rubs, clicks, or gallops, distal pulses intact, no carotid bruits, no JVD  Pulmonary/Chest: bilateral crackles  Abdomen: soft, non-tender, non-distended, normal bowel sounds, no masses   Extremities: Cool extremities, 1+ bilateral pedal pulses  Skin: central warmth, jaundice  Head: normocephalic and atraumatic  Eyes: pupils equal, round, and reactive to light  Neck: supple and non-tender without mass, no thyromegaly   Musculoskeletal: normal range of motion, no joint swelling, deformity or tenderness  Neurological: sedated/intubated, responds to pain and stimuli    Medications:    piperacillin-tazobactam  2,250 mg Intravenous Q6H    calcium acetate  1,334 mg Per NG tube TID WC    chlorhexidine  15 mL Mouth/Throat BID    famotidine (PEPCID) injection  20 mg Intravenous Daily    albumin human  25 g Intravenous Q6H    calcium replacement protocol   Other RX Placeholder    fluticasone  2 spray Each Nostril Daily    sodium chloride flush  10 mL Intravenous 2 times per day    [Held by provider] guaiFENesin  600 mg Oral BID    ticagrelor  90 mg Oral BID    aspirin  81 mg Oral Daily      norepinephrine (LEVOPHED) infusion (double concentration) 50 mcg/min (05/09/21 0730)    phenylephrine (ANA-SYNEPHRINE) 250 mg/250 mL infusion 200 mcg/min (05/09/21 1030)    prismaSATE BGK 4/2.5 1,000 mL/hr (05/09/21 0944)    prismaSol BGK 2/3.5 1,000 mL/hr (05/09/21 0947)    prismaSol BGK 2/3.5 1,000 mL/hr (05/09/21 0946)    sodium chloride      heparin (PORCINE) Infusion 15 Units/kg/hr (05/09/21 0748)    midazolam 2 mg/hr (05/08/21 1943)    sodium chloride      dexmedetomidine Stopped (05/08/21 1939)    fentaNYL (SUBLIMAZE) 1250 mcg in sodium chloride 0.9 % 250 mL 50 mcg/hr (05/09/21 0155)    bumetanide 0.1 mg/mL infusion 2 mg/hr (05/09/21 1055)    DOBUTamine 10 mcg/kg/min (05/09/21 0155)    heparin 25,000 units in dextrose 5 %       sodium chloride, 1,000 mL, Once PRN  potassium chloride, 20 mEq, PRN  magnesium sulfate, 1,000 mg, PRN  calcium gluconate, 1,000 mg, PRN    Or  calcium gluconate, 2,000 mg, PRN    Or  calcium gluconate, 3,000 mg, PRN    Or  calcium gluconate, 4,000 mg, PRN  sodium phosphate IVPB, 6 mmol, PRN    Or  sodium phosphate IVPB, 12 mmol, PRN    Or  sodium phosphate IVPB, 18 mmol, PRN    Or  sodium phosphate IVPB, 24 mmol, PRN  acetaminophen, 650 mg, Q4H PRN  heparin (porcine), 4,000 Units, PRN  heparin (porcine), 2,000 Units, PRN  fentanNYL, 25 mcg, Q1H PRN  sodium chloride, , PRN  sodium chloride flush, 10 mL, PRN  acetaminophen, 650 mg, Q6H PRN  melatonin, 4.5 mg, Nightly PRN  hydrOXYzine, 25 mg, TID PRN  nitroGLYCERIN, 0.4 mg, Q5 Min PRN      Lab Data:    Cardiac Enzymes:  No results for input(s): CKTOTAL, CKMB, CKMBINDEX, TROPONINI in the last 72 hours.     CBC:   Lab Results   Component Value Date    WBC 15.8 05/09/2021    RBC 3.40 05/09/2021    HGB 10.2 05/09/2021    HCT 29.7 05/09/2021     05/09/2021       CMP:    Lab Results   Component Value Date     05/09/2021    K 5.4 05/09/2021    K 5.2 05/08/2021    CL 96 05/09/2021    CO2 20 05/09/2021    BUN 76 05/09/2021    CREATININE 2.5 05/09/2021    LABGLOM 24 05/09/2021    GLUCOSE 138 05/09/2021    CALCIUM 8.0 05/09/2021       Hepatic Function Panel:    Lab Results   Component Value Date    ALKPHOS 33 05/09/2021     05/09/2021    AST 1,252 05/09/2021    PROT 5.2 05/09/2021    BILITOT 7.9 05/09/2021    BILIDIR 1.4 05/09/2021    LABALBU 3.2 05/09/2021    LABALBU 4.6 11/21/2011       Magnesium:    Lab Results   Component Value Date    MG 2.2 05/09/2021       PT/INR:    Lab Results   Component Value Date    INR 2.07 05/08/2021       HgBA1c:    Lab Results   Component Value Date    LABA1C 5.4 05/07/2021       FLP:  No results found for: TRIG, HDL, LDLCALC, LDLDIRECT, LABVLDL    TSH:    Lab Results   Component Value Date    TSH 0.735 05/07/2021         Assessment:  Cardiogenic Shock 2/2 Severe AS/CAD  S/p BAV 26 x 4.5, 5/7/21  S/p PCI of the LAD, 5/7/21  S/p Impella CP at P9, Ext Fem-Fem - explanted 5/9 due to profound hemolysis despite multiple repositions  Severe RCA disease - non-revascularized  Acute hypoxic resp failure - CXR worse as compared to prior  Aflutter/Afib s/p JEMMA-DCCV, 5/7/21 - now SR  TIFF on CKD  Shock Liver - mildly improved  Coccyx wound    Plan:  · Impella explanted at , patient remains stable  · No sig UOP and PA pressures have increased, and CXR worse, start CVVH today, goal is net 200 cc removal, CVP goal 12-15, PA goal 40/20s  · D/c Bumex  · BMP, VBG, ABG, and CBC q 8 hours  · CI 2.4 - decreased Dobutamine to 10   · Wean Phenylephrine as tolerated  · Trickle feeds  · PPI  · Hold Heparin for now  · DAPT  · Talked to wife, daughter has said she understands his prognosis and is only coming back should he awake, they are fine with pursuing aggressive care for now  · Warm blankets  · IV Abx   · Prognosis: guarded, critical  · FULL CODE    Greater than 60 minutes of time was spent managing critical issues, discussing with the family, and coordinating care.       Electronically signed by Eulalia Jung MD on 5/9/2021 at 11:49 AM

## 2021-05-09 NOTE — PROGRESS NOTES
Renal Adjustment Follow-up    Recent Labs     05/08/21 2011 05/09/21  0432   BUN 72* 76*   CREATININE 2.4* 2.5*     Estimated Creatinine Clearance: 19 mL/min (A) (based on SCr of 2.5 mg/dL (H)).     Plan: Change Zosyn 3.375 gm Q2H, extended infusion     Julianna Guerrero PharmD 5/9/2021 9:29 AM

## 2021-05-09 NOTE — PROGRESS NOTES
2019 - Secure message sent to Dr. Sandra Hopson regarding pt Svo2 50%. Waiting for response. 2024 - Call received from Dr. Sandra Hopson. Updated on Svo2 results. Reviewed vital signs, lab results, drips, and hemodynamics at this time. Dr. Sandra Hopson stated to update Dr. Tanya Leahy on pt being up 2L and with BMP when resulted for possible CRRT. No further orders received a this time. 2103 - Secure message sent to Dr. Tanya Leahy with BMP results and intake and output. Waiting for response. 2110 - call received from Dr. Tanya Leahy. Reviewed intake and output and BMP results. No new orders received at this time. 2116 - secure message sent to Dr. Sandra Hospon updating him on Dr. Geralene Snellen decision for no CRRT at this time. 2121- Call received from Dr. Sandra Hopson. Reviewed pt case at this time. See new orders.      2200- Impella flow increased to P6 by Madelaine Sanford NP.

## 2021-05-09 NOTE — PROGRESS NOTES
CRITICAL CARE PROGRESS NOTE      Patient:  Sophia Springer    Unit/Bed:4D-006-A  YOB: 1930  MRN: [id number]   PCP: Trini Cast MD  Date of Admission: 2021  Chief Complaint:- Cardiogenic shock     Cardiogenic Shock: Due to non-STEMI and worsening LV function in the setting of severe aortic stenosis.  Patient with persistent hypotension despite fluid resuscitation efforts. Doubled troponin level.  Patient intubated on arrival to ICU , CVC placed. Immediately went to Cath Lab after, PCI to LAD w/ EMILY. RCA shows severe CAD however severe vasoconstriction was present due to pressors, unable to perform PCI.  Patient also underwent aortic valvuloplasty and an Impella was placed. JEMMA confirmed Impella in correct position, patient also underwent electrocardioversion and converted to NSR. Patient with improved PA O2 saturations after procedure. CVP 16, CI 2.4. Suspect third spacing. Patient has had  urine output. Patient having difficult time maintaining adequate MAP. -Requiring dobutamine 10 mcg/ kg/hr, Solo-Synephrine 120 mcg/min    -Continuing with Dobutrex gtt as per Dr. Kasia Castro.    -Bumex gtt initiated. -IV albumin to run every 6 hours   -CVVH to be initiated today.   -Further recommendations as per Dr. Kasia Castro. Acute Respiratory Failure: Patient emergently intubated on . Current vent settings pressure control 12, PEEP 6. Rate set at 16 breaths/min. Continue with lung protective strategies while on ventilation.     -Sedation with IV Fentanyl 50 mcg/h and Precedex 0.9 mcg/kg/h.    -Additional doses of Versed needed due to increased RR. -Wean FiO2/O2 as tolerated with goal SpO2 > 90%. Severe Symptomatic Aortic Stenosis: Echo on 2021 revealed EF of 25%, severe global hypokinesis of the LV, aortic valvular area 0.5 cm², mean aortic gradient 35 mmHg, peak velocity 364 cm/s. Patient no longer TAVR candidate given current clinical status.   S/p aortic valvuloplasty 5/7.  Will continue to optimize BP with pressors as needed, Dobutrex gtt for inotropic support. Pneumonia: Pneumonia molecular panel reports Pseudomonas aeruginosa.    -Respiratory cultures pending    Coccyx Wound: According to patient's wife, patient first noted redness and tenderness in the mid lower back. Approximately 1 week ago he was seen for this and was given an antibiotic of which his wife did not remember, unable to find record in epic. Patient however discontinued taking the medication on his own, patient's wife states that she feels the redness/wound on the back did not fully heal.  Concern for sepsis given continued hemodynamic instability despite cardiovascular interventions. -IV Zosyn and Vancomycin started 5/8 for broad-spectrum coverage.    -Pan cultures pending.     -Patient with low-grade fever, leukocytosis. Likely exacerbated by recent cardiac interventions. Shock Liver: As evident by hepatocellular transaminitis. Ammonia level 31, AST 25 2, bilirubin 9.7, this is increased from yesterday. Hemolysis secondary to Impella is likely contributing to skewed numbers. LDH continues to remain greater than 2500. His lab values are likely not adequate representation due to massive hemolysis secondary to Impella pump. New Onset Atrial Fibrillation with RVR:  S/p JEMMA/DCCV at 100 J on 5/7. Patient now in sinus rhythm.     -Amiodarone gtt discontinued.     -Continue to monitor on telemetry. Non-STEMI: EKG with ischemic changes.  Troponin elevation from 0.337 --> 0.790.  Status post PCI w/ EMILY to LAD. RCA with severe obstruction, however no PCI could be done due to severe vasoconstriction.   Likely part of the etiology of patient's acute decompensation and new onset A. fib with RVR.     -On DAPT, will continue with Heparin gtt as well    Decompensated HFrEF: As stated above, EF is 25%.  Symptomatic findings related to decompensated HFrEF with pulmonary edema.     -Diuresing with Bumex gtt.    TIFF on CKD stage III:  Worsening renal function, somewhat influenced by intravenous dye used for cath. Patient making minimal urine. Various electrolyte abnormalities, although partially due to hemolysis from the Impella, secondary to impaired renal excretion and resorption. Patient has dialysis catheter in place. Patient has had improved urine output in the last 12 hours.   -Pending nephrology recommendations regarding initiation of CRRT/CVVH    Chronic Macrocytic Anemia:  Exacerbated due to hemolysis from Impella. Need to set strict transfusion goal with 1 pRBCs if Hb < 9.0. History of GERD: Complicated during JEMMA.   Trickle feeds can be started tomorrow.   -Continue PPI prophylaxis     INITIAL H AND P AND ICU COURSE:  Patient is a 80-year-old  male lifetime non-smoker who was transferred to the ICU from stepdown unit due to cardiogenic shock.  Past medical history includes severe AS, mild MR, HFrEF with EF 25%, HTN and GERD.  [facility]ed to [facility] on the night of 5/6 with chief complaint of palpitations and shortness of breath.  Patient reported new onset palpitations and this will be which started around 1700 hours on 5/6.  Reported feeling similar symptoms 3 days prior.  On arrival denied any other associated symptoms, denied chest pain, recent illness, fever/chills, nausea/emesis, syncope. [facility] cardiologist is Dr. Marko Ray had referred him to Dr. Rolanda Rose for TAVR evaluation.  Diagnostic RHC and LHC was scheduled.  Patient without apparent history of atrial fibrillation, no recent changes in outpatient medications.  Apparent baseline SBP in the 90s secondary to outpatient medications and diuretics.  Does not use oxygen at baseline.  He was found to be tachycardic in the 150s as well as tachypneic in the ED. Mickie Hirschfeld revealed cardiomegaly, bilateral opacities with right > left.  EKG revealed A. fib with RVR as well as lateral ischemia.  Was given IV diltiazem twice with no improvement.  Started mg Intravenous Daily    piperacillin-tazobactam  3,375 mg Intravenous Q8H    albumin human  25 g Intravenous Q6H    calcium replacement protocol   Other RX Placeholder    fluticasone  2 spray Each Nostril Daily    sodium chloride flush  10 mL Intravenous 2 times per day    [Held by provider] guaiFENesin  600 mg Oral BID    ticagrelor  90 mg Oral BID    aspirin  81 mg Oral Daily     Continuous Infusions:   heparin (PORCINE) Infusion 15 Units/kg/hr (05/08/21 2024)    phenylephrine (ANA-SYNEPHRINE) 50mg/250mL infusion 140 mcg/min (05/09/21 0523)    midazolam 2 mg/hr (05/08/21 1943)    sodium chloride      dexmedetomidine Stopped (05/08/21 1939)    fentaNYL (SUBLIMAZE) 1250 mcg in sodium chloride 0.9 % 250 mL 50 mcg/hr (05/09/21 0155)    norepinephrine 40 mcg/min (05/09/21 0323)    bumetanide 0.1 mg/mL infusion 2 mg/hr (05/09/21 0416)    DOBUTamine 10 mcg/kg/min (05/09/21 0155)    heparin 25,000 units in dextrose 5 %         PHYSICAL EXAMINATION:  T: 99.9. P: 94. RR: 20. B/P: 100/57. FiO2:  30%. O2 Sat:  97.  I/O:  1723/1223  Body mass index is 21.87 kg/m². GCS:   7  Mode: PCV +  Rate Set: 12   Rate Measured: 20  PIP: 21  PEEP: 6   General:   Acute on chronically ill appearing elderly male. Intubated and ventilated. HEENT:  normocephalic and atraumatic. No scleral icterus. PERR  Lungs:  Bibasilar crackles bilaterally. Blood in ET tube  Cardiac: Tachycardic rate, normal S1 and S2. No JVD. Abdomen: soft. Nontender. Extremities:  +1 BLE pitting. Vasculature: capillary refill < 3 seconds. Palpable dorsalis pedis pulses. Skin: Jaundice cool to touch   Psych: Unable to assess due to patient being intubated and sedated  Lymph:  No supraclavicular adenopathy. Neurologic: Intubated and sedated    Data: (All radiographs, tracings, PFTs, and imaging are personally viewed and interpreted unless otherwise noted).     Sodium 133, potassium 5.4, chloride 96, CO2 20, BUN 6, creatinine 2.5, anion gap 17.0, calcium 0.96, phosphorus 6.6   Lactic acid 2.3   ACT-K 164   LDH > 2500   Albumin 3.2, alk phos 83, , ammonia 31, bilirubin 7.9, direct bili 1.4, total protein 5.2    WBC 15.8, hemoglobin 10.2, platelets 784   VBG 7.35/41/31/23   Telemetry shows normal sinus rhythm   CXR 5/9/21 shows bilateral lower lobe airspace disease as well as pleural effusion. Patient also demonstrates cardiomegaly. Seen with multidisciplinary ICU team.  Meets Continued ICU Level Care Criteria:    [x] Yes   [] No - Transfer Planned to listed location:  [] HOSPITALIST CONTACTED-      Case and plan discussed with Dr. Pastora William. Electronically signed by Lisa Salgado DO  CRITICAL CARE SPECIALIST    I have independently performed an evaluation on Analia Hsihe . I have reviewed the above documentation completed by the [facility]. Please see my additional contributions to the HPI, physical exam, assessment/medical decision making. Hemolyzing, having blood out of ET tube. Has gotten 5 units of packed cells in the last 24 hours. Pressor requirement continues to rise CRRT not initiated yesterday, spoke with Dr. Wander Quiñones in cardiology would like initiated today to try to offload fluid with high PA pressures 50-75. Discussed with wife she is okay with trial of CRRT. Understands critically ill. Will reevaluate CODE STATUS. CC time 35 minutes.  Time was discontiguous.  Time does not include procedures.     Electronically signed by Balta Monaco MD on 5/9/2021 at 9:27 AM

## 2021-05-09 NOTE — PROCEDURES
Impella Explant    After informed consent from POA/wife, labs were reviewed, the patient was weaned appropriately from P5-P2 with stable hemodynamics. Impella removed from ventricle, motor current went flat, changed to P0, disconnected from main console, and removed from the patient. RFA sheath was then removed, and both Perclose sutures were completed, with good hemostasis. Manual pressure was taken over by the RN. No complications. EBL < 10 cc. Summary:  Impella CP Explant    Plan: No heparin x at least 6 hours, C-clamp over the site, continue ICU care.     Everett Segal MD  Interventional Cardiology

## 2021-05-10 NOTE — PROGRESS NOTES
0840 patient noted to be asystole on monitor, absence of pulsatility on arterial line. Compression initiated. 1 mg of epi given. Frederick Fuchs DNP at bedside. 1093 Pulse check. No pulse present. Absence of pulsatility on arterial line. Asystole on monitor. Compressions resumed. 0843 1 mg epi given. 8346 Dr. Kasia Castro and Dr. Karen Briseno at bedside. Order given to stop CPR.

## 2021-05-10 NOTE — DISCHARGE SUMMARY
DEATH SUMMARY      Patient:  Ana Powers  YOB: 1930  MRN: [id number]   Acct: [de-identified]    Primary Care Physician: Garnette Harada, MD    Admit date:  5/6/2021    Discharge date:  5/10/2021    Admission Condition: Guarded, critical     Discharge Diagnoses:     Cardiogenic Shock  Severe Symptomatic Aortic Stenosis  Obstructive Coronary Artery Disease  Acute Kidney Injury needing CRRT  Ischemic Cardiomyopathy  Shock Liver  Atrial Fibrillation  Coccyx Wound     Consultations:-  [] NONE [x] Cardiology  [x] Nephrology  [] Hemo onco   [] GI   [] ID  [] Endocrine  [] Pulm    [] Neuro    [] Psych   [] Urology  [] ENT   [] G SURGERY   []Ortho    []CV surg    [] Palliative  [] Hospice [] Pain management   []    []TCU   [] PT/OT  OTHERS:-  Critical Care     Significant Diagnostic Studies:    Xr Chest Portable    Result Date: 5/7/2021  1 view chest x-ray Comparison:  REGGIESR  - XR CHEST PORTABLE  - 05/07/2021 05:10 PM EDT Findings: Sidehole of the orogastric tube in the stomach with tip directed toward the fundus. An IVC approach [place]-Frank[place] catheter tip in the lower left pulmonary artery. Endotracheal tube and left subclavian central catheter are unchanged. Stable cardiomegaly with central pulmonary vascular congestion. Similar bilateral layering pleural effusions. Calcification in the aortic knob. No acute fracture. 1.  Sidehole of the orogastric tube in the stomach with tip directed toward the fundus. 2.  IVC approach [place]-Franklin catheter tip in the left lower lobe pulmonary artery. 3.  No other significant interval change. This document has been electronically signed by: Dick Browne MD on 05/07/2021 09:28 PM    Xr Chest Portable    Result Date: 5/7/2021  1 view chest x-ray Comparison:  REGGIESR  - XR CHEST PORTABLE  - 05/07/2021 03:45 PM EDT Findings: Left subclavian central line placement with tip in the region of the brachiocephalic vein. Endotracheal tube and enteric tube are stable. Stable layering bilateral pleural effusions greater on the right. Stable borderline cardiomegaly. Calcification in the aortic knob. No pneumothorax. Degenerative changes in both shoulders. Left subclavian central line placement with tip in the central brachiocephalic vein. This document has been electronically signed by: Brooklynn Arriaga MD on 05/07/2021 07:02 PM    Xr Chest Portable    Result Date: 5/7/2021  PROCEDURE: XR CHEST PORTABLE CLINICAL INFORMATION: central line; ETT, OG COMPARISON: Earlier film same date, 0202 hours. TECHNIQUE: A single mobile view of the chest was obtained. 1. Mild cardiomegaly. ET tube tip 6 cm proximal to saad. NG tube passes into the stomach. A left subclavian sheath is present and projects superior to the left clavicular head, possibly passing into the left internal jugular vein. 2. Severe degenerative changes both shoulders. Large bore chest tube projects over the pleural space right side. 3. Moderate-sized pleural effusion right side. Small effusion left side. Moderate superimposed bibasilar atelectasis/pneumonia. 4. Overall appearance of chest has worsened somewhat from earlier. **This report has been created using voice recognition software. It may contain minor errors which are inherent in voice recognition technology. ** Final report electronically signed by Dr. Jevon Subramanian on 5/7/2021 4:12 PM    Xr Chest Portable    Result Date: 5/7/2021  Single view of the chest Comparison:  REGGIE,SR  - XR CHEST PORTABLE  - 05/06/2021 06:55 PM EDT Findings: Moderate opacity right lung base, unchanged. Mild to moderate opacity left lung base, unchanged. Cardiomegaly present. Impression: Stable portable chest with opacities in bilateral lung bases. Cardiomegaly present.  This document has been electronically signed by: Raul Martin MD on 05/07/2021 02:46 AM    Xr Chest Portable    Result Date: 5/6/2021  Chest radiograph AP view Comparison:  CR,SR  - XR CHEST 1 VW  - 05/03/2021 01:45 PM EDT Findings: Enlarged cardiac contour, similar before. Increasing moderate to large right pleural effusion, superimposed scattered patchy opacities of the right lung. Small left pleural effusion decreased. Central pulmonary vascular congestion. No pneumothorax is observed. No acute fracture. Advanced degenerative changes of shoulders, unchanged. Impression: 1. Moderate to large right pleural effusion, increased. Superimposed scattered patchy opacities of the right lung, may reflect pulmonary edema, atelectasis or infection. 2.  Small left pleural effusion, improved. 3.  Otherwise unchanged. This document has been electronically signed by:  Silvio Landa MD on 05/06/2021 07:22 PM       [address]:-  Patient is a 44-year-old  male lifetime non-smoker who was transferred to the ICU from stepdown unit due to cardiogenic shock.  Past medical history includes severe AS, mild MR, HFrEF with EF 25%, HTN and GERD.  Presented to [facility] on the night of 5/6 with chief complaint of palpitations and shortness of breath.  Patient reported new onset palpitations and this will be which started around 1700 hours on 5/6.  Reported feeling similar symptoms 3 days prior.  On arrival denied any other associated symptoms, denied chest pain, recent illness, fever/chills, nausea/emesis, syncope. [facility] cardiologist is Dr. Marko Ray had referred him to Dr. Rolanda Rose for TAVR evaluation.  Diagnostic RHC and LHC was scheduled.  Patient without apparent history of atrial fibrillation, no recent changes in outpatient medications.  Apparent baseline SBP in the 90s secondary to outpatient medications and diuretics.  Does not use oxygen at baseline.  He was found to be tachycardic in the 150s as well as tachypneic in the ED. Mickie Sihnschfeld revealed cardiomegaly, bilateral opacities with right > left.  EKG revealed A. fib with RVR as well as lateral ischemia.  Was given IV diltiazem twice with no improvement.  Started

## 2021-05-10 NOTE — PROGRESS NOTES
Limited progress note    Patient was relatively stable yesterday, this AM requiring more pressors, but again titrated off. Renal function stable as well, however, prior to my evaluation, VT/VT ensued. No pulse. CPR started. I called the wife, at this point, we agree no further life-sustaining efforts are to be made and ACLS/CPR will be stopped. She was grateful for the care and accepted that it was Mr. Collier's time. No further heroic measures.       TOD: 8:46 AM    Sunitha Meza MD  Interventional Cardiology

## 2021-05-10 NOTE — PLAN OF CARE
Problem: RESPIRATORY  Goal: Normal spontaneous ventilation  Outcome: Ongoing  Note: Vent setting optimized to achieve target tidal volume, respiratory rate and ideal oxygen saturations. SBT will be performed when appropriate. Patient Weaning Progress    The patient's vent settings was not able to be weaned this shift. Spontaneous weaning trial  was not attempted.      *Specific details of weaning located in Ventilator documentation flowsheets*

## 2021-05-10 NOTE — PROGRESS NOTES
mL Intravenous 2 times per day    [Held by provider] guaiFENesin  600 mg Oral BID    ticagrelor  90 mg Oral BID    aspirin  81 mg Oral Daily     Continuous Infusions:   vasopressin (Septic Shock) infusion 0.04 Units/min (05/10/21 0502)    norepinephrine (LEVOPHED) infusion (double concentration) 55 mcg/min (05/10/21 0730)    phenylephrine (ANA-SYNEPHRINE) 250 mg/250 mL infusion Stopped (05/10/21 0530)    prismaSATE BGK 4/2.5 1,000 mL/hr (05/10/21 0642)    prismaSol BGK 2/3.5 1,000 mL/hr (05/10/21 0995)    prismaSol BGK 2/3.5 1,000 mL/hr (05/10/21 0775)    sodium chloride      sodium chloride      midazolam 2 mg/hr (05/10/21 0438)    fentaNYL (SUBLIMAZE) 1250 mcg in sodium chloride 0.9 % 250 mL 25 mcg/hr (05/10/21 0344)    DOBUTamine 15 mcg/kg/min (05/10/21 0416)       CBC:   Recent Labs     05/08/21  0540 05/08/21  0540 05/09/21  0620 05/09/21  0620 05/09/21 2111 05/09/21  2341 05/10/21  0237 05/10/21  0717   WBC 16.2*  --  15.8*  --   --   --   --  16.3*   HGB 9.8*   < > 10.2*   < > 11.1*  --  10.7* 10.7*     --  151  --   --  92*  --  81*    < > = values in this interval not displayed. CMP:    Recent Labs     05/09/21  2111 05/10/21  0237 05/10/21  0530   * 132* 134*   K 5.2 4.2 4.8   CL 99 99 99   CO2 22* 23 21*   BUN 46* 35* 31*   CREATININE 1.3* 1.2 0.9   GLUCOSE 124* 115* 118*   CALCIUM 9.0 9.4 9.4   LABGLOM 52* 57* 79*     Troponin: No results for input(s): TROPONINI in the last 72 hours. BNP: No results for input(s): BNP in the last 72 hours. INR:   Recent Labs     05/07/21  1443 05/08/21  1554   INR 1.63* 2.07*     Lipids: No results for input(s): CHOL, LDLDIRECT, TRIG, HDL, AMYLASE, LIPASE in the last 72 hours. Liver:   Recent Labs     05/10/21  0530   *   *   ALKPHOS 40   PROT 5.8*   LABALBU 4.5   BILITOT 8.8*     Iron:  No results for input(s): IRONS, FERRITIN in the last 72 hours.     Invalid input(s): LABIRONS  XR CHEST PORTABLE   Final Result Impression:   1. Stable bilateral lower lobe airspace disease and pleural effusions   2. Stable cardiomegaly   3. Supportive devices as described in situ. This document has been electronically signed by: Barbra Anderson MD on    05/09/2021 05:17 AM      XR CHEST PORTABLE   Final Result   1. Mild cardiomegaly. [place]-Franklin catheter inserted via femoral route with the tip in the left lower lobe pulmonary artery. An Impella device remains in place, not appreciably changed in position from the earlier study. 2. ET tube and NG tube remain in good position. A right jugular dialysis catheter is present with its tip in superior vena cava. A left subclavian sheath is present with the catheter tip passing coaxially through the sheath and located in left innominate    vein. 3. No pneumothorax is seen. Hazy groundglass appearance of the right lung diffusely and left mid and lower lung fields, consistent with pneumonia/pulmonary edema. A small effusion on either side cannot be excluded. Severe degenerative changes both    shoulders. 4. Overall appearance of the chest has worsened since prior. **This report has been created using voice recognition software. It may contain minor errors which are inherent in voice recognition technology. **      Final report electronically signed by Dr. Ute Morales on 5/8/2021 5:08 PM      XR CHEST PORTABLE   Final Result   1. Mild cardiomegaly. A [place]-Franklin catheter, inserted via the femoral route is present with its tip in the left lower lobe pulmonary artery. The Impella device has been pulled back from its prior position seen on earlier film. 2. Left subclavian line with its tip in left innominate vein. NG and ET tubes remain in good position. 3. Tiny bilateral pleural effusions. Moderate hazy appearance of the entire right lung as well as left mid and lower lung fields, consistent with pneumonia/pulmonary edema. Overall appearance slightly improved from prior. software. It may contain minor errors which are inherent in voice recognition technology. **      Final report electronically signed by Dr. Leo Schwab on 5/7/2021 4:12 PM      XR CHEST PORTABLE   Final Result   Impression:   Stable portable chest with opacities in bilateral lung bases. Cardiomegaly present. This document has been electronically signed by: Anibal Friedman MD on    05/07/2021 02:46 AM      XR CHEST PORTABLE   Final Result   Impression:   1. Moderate to large right pleural effusion, increased. Superimposed    scattered patchy opacities of the right lung, may reflect pulmonary edema,    atelectasis or infection. 2.  Small left pleural effusion, improved. 3.  Otherwise unchanged. This document has been electronically signed by: Jacob Alba MD on 05/06/2021    07:22 PM            Objective:   Vitals: BP (!) 89/58   Pulse 100   Temp 99.7 °F (37.6 °C) (Core)   Resp 15   Ht 5' 9\" (1.753 m)   Wt 148 lb 1.6 oz (67.2 kg)   SpO2 (!) 81%   BMI 21.87 kg/m²    Wt Readings from Last 3 Encounters:   05/06/21 148 lb 1.6 oz (67.2 kg)   05/06/21 147 lb (66.7 kg)   04/29/21 156 lb 12.8 oz (71.1 kg)      24HR INTAKE/OUTPUT:      Intake/Output Summary (Last 24 hours) at 5/10/2021 0829  Last data filed at 5/10/2021 0810  Gross per 24 hour   Intake 4965.73 ml   Output 5608 ml   Net -642.27 ml       Constitutional: Well-developed elderly gentleman on mechanical support , no apparent distress   Skin:normal with no rash or lesions. .  Appears jaundice  HEENT:Pupils are reactive. Endotracheal tube is noted. Orogastric tube is noted. .Oral mucosa is moist   Neck:supple with no carotid bruit  Cardiovascular: Regular sinus rhythm. Respiratory: Decreased breath sound  Abdomen: Soft. Good bowel sounds. Ext: 1+ bilateral LE edema  Musculoskeletal:Intact  Neuro:  On mechanical support      Electronically signed by Dakota Foley MD on 5/10/2021 at 8:29 AM  **This report has been created using voice

## 2021-05-10 NOTE — PROGRESS NOTES
9343- CRRT stopped, 210 ml of blood returned to patient. [address] organ donation notified. Reference number [id number]. OK to release.

## 2021-05-10 NOTE — FLOWSHEET NOTE
05/09/21 2300   Provider Notification   Reason for Communication Review case   Provider Name Dr. Jose Alva    Provider Notification Physician   Method of Communication Face to face   Response See orders   Notification Time 2300   Dr. Jose Alva in to see patient. Updated him on recent labs, cardiac numbers and vitals. Orders to give another bottle of Almumin 5%, to increase Doputamine gtt to 15 mcg/kg/min, add Midodrine, and get ABG and SVO2.

## 2021-05-11 LAB
EKG ATRIAL RATE: 112 BPM
EKG Q-T INTERVAL: 404 MS
EKG QRS DURATION: 154 MS
EKG QTC CALCULATION (BAZETT): 551 MS
EKG R AXIS: -25 DEGREES
EKG T AXIS: 131 DEGREES
EKG VENTRICULAR RATE: 112 BPM

## 2021-05-12 LAB
BLOOD CULTURE, ROUTINE: NORMAL
BLOOD CULTURE, ROUTINE: NORMAL
HAPTOGLOBIN: < 10 MG/DL (ref 30–200)